# Patient Record
Sex: FEMALE | Race: WHITE | NOT HISPANIC OR LATINO | Employment: FULL TIME | URBAN - METROPOLITAN AREA
[De-identification: names, ages, dates, MRNs, and addresses within clinical notes are randomized per-mention and may not be internally consistent; named-entity substitution may affect disease eponyms.]

---

## 2017-06-25 DIAGNOSIS — F32.A DEPRESSION: ICD-10-CM

## 2017-06-25 RX ORDER — SERTRALINE HYDROCHLORIDE 50 MG/1
TABLET, FILM COATED ORAL
Qty: 90 TABLET | Refills: 2 | Status: SHIPPED | OUTPATIENT
Start: 2017-06-25 | End: 2017-09-19 | Stop reason: SDUPTHER

## 2017-08-09 ENCOUNTER — TELEPHONE (OUTPATIENT)
Dept: HEMATOLOGY/ONCOLOGY | Facility: CLINIC | Age: 57
End: 2017-08-09

## 2017-08-09 ENCOUNTER — TELEPHONE (OUTPATIENT)
Dept: SURGERY | Facility: CLINIC | Age: 57
End: 2017-08-09

## 2017-08-09 DIAGNOSIS — Z85.3 PERSONAL HISTORY OF MALIGNANT NEOPLASM OF BREAST: Primary | ICD-10-CM

## 2017-08-09 NOTE — TELEPHONE ENCOUNTER
Returned call, schedule pt mammogram and appointment with isma Acevedo requested that the appointment be on separate days

## 2017-08-09 NOTE — TELEPHONE ENCOUNTER
----- Message from Dorothy Porras MA sent at 8/9/2017  9:59 AM CDT -----  Contact: Pt       ----- Message -----  From: Clement Hernandez  Sent: 8/9/2017   9:55 AM  To: Joshua SANTIAGO Staff    Pt would like a call back from nurse in ref to rescheduling visit to 9/27/17, if possible    Pt can be reached at [phone]

## 2017-08-09 NOTE — TELEPHONE ENCOUNTER
----- Message from Giovanna Zamorano sent at 8/9/2017 11:36 AM CDT -----  [phone]//pt states that she needs to speak with nurse in ref to her mammo order//please call//thank you

## 2017-09-19 ENCOUNTER — OFFICE VISIT (OUTPATIENT)
Dept: HEMATOLOGY/ONCOLOGY | Facility: CLINIC | Age: 57
End: 2017-09-19
Payer: COMMERCIAL

## 2017-09-19 ENCOUNTER — HOSPITAL ENCOUNTER (OUTPATIENT)
Dept: RADIOLOGY | Facility: HOSPITAL | Age: 57
Discharge: HOME | End: 2017-09-19
Attending: SURGERY
Payer: COMMERCIAL

## 2017-09-19 ENCOUNTER — HOSPITAL ENCOUNTER (OUTPATIENT)
Dept: RADIOLOGY | Facility: HOSPITAL | Age: 57
Discharge: HOME | End: 2017-09-19
Attending: INTERNAL MEDICINE
Payer: COMMERCIAL

## 2017-09-19 VITALS
SYSTOLIC BLOOD PRESSURE: 127 MMHG | HEART RATE: 59 BPM | TEMPERATURE: 98 F | DIASTOLIC BLOOD PRESSURE: 73 MMHG | WEIGHT: 180.13 LBS | BODY MASS INDEX: 29.97 KG/M2 | RESPIRATION RATE: 14 BRPM

## 2017-09-19 DIAGNOSIS — J45.20 MILD INTERMITTENT ASTHMA WITHOUT COMPLICATION: ICD-10-CM

## 2017-09-19 DIAGNOSIS — Z85.3 PERSONAL HISTORY OF MALIGNANT NEOPLASM OF BREAST: ICD-10-CM

## 2017-09-19 DIAGNOSIS — E03.9 ACQUIRED HYPOTHYROIDISM: ICD-10-CM

## 2017-09-19 DIAGNOSIS — J45.909 UNSPECIFIED ASTHMA(493.90): ICD-10-CM

## 2017-09-19 DIAGNOSIS — Z85.3 HISTORY OF BREAST CANCER: Primary | ICD-10-CM

## 2017-09-19 DIAGNOSIS — F32.89 OTHER DEPRESSION: ICD-10-CM

## 2017-09-19 DIAGNOSIS — G89.29 CHEST WALL PAIN, CHRONIC: ICD-10-CM

## 2017-09-19 DIAGNOSIS — R10.13 EPIGASTRIC PAIN: ICD-10-CM

## 2017-09-19 DIAGNOSIS — R07.89 CHEST WALL PAIN, CHRONIC: ICD-10-CM

## 2017-09-19 PROCEDURE — 99214 OFFICE O/P EST MOD 30 MIN: CPT | Mod: S$GLB,,, | Performed by: INTERNAL MEDICINE

## 2017-09-19 PROCEDURE — 77062 BREAST TOMOSYNTHESIS BI: CPT | Mod: TC

## 2017-09-19 PROCEDURE — 99999 PR PBB SHADOW E&M-EST. PATIENT-LVL III: CPT | Mod: PBBFAC,,, | Performed by: INTERNAL MEDICINE

## 2017-09-19 PROCEDURE — 71101 X-RAY EXAM UNILAT RIBS/CHEST: CPT | Mod: 26,,, | Performed by: RADIOLOGY

## 2017-09-19 PROCEDURE — 77062 BREAST TOMOSYNTHESIS BI: CPT | Mod: 26,,, | Performed by: RADIOLOGY

## 2017-09-19 PROCEDURE — 77066 DX MAMMO INCL CAD BI: CPT | Mod: 26,,, | Performed by: RADIOLOGY

## 2017-09-19 PROCEDURE — 3074F SYST BP LT 130 MM HG: CPT | Mod: S$GLB,,, | Performed by: INTERNAL MEDICINE

## 2017-09-19 PROCEDURE — 3078F DIAST BP <80 MM HG: CPT | Mod: S$GLB,,, | Performed by: INTERNAL MEDICINE

## 2017-09-19 PROCEDURE — 71101 X-RAY EXAM UNILAT RIBS/CHEST: CPT | Mod: TC

## 2017-09-19 PROCEDURE — 3008F BODY MASS INDEX DOCD: CPT | Mod: S$GLB,,, | Performed by: INTERNAL MEDICINE

## 2017-09-19 RX ORDER — ALBUTEROL SULFATE 90 UG/1
2 AEROSOL, METERED RESPIRATORY (INHALATION) EVERY 6 HOURS PRN
Qty: 18 G | Refills: 12 | Status: SHIPPED | OUTPATIENT
Start: 2017-09-19 | End: 2019-10-09 | Stop reason: SDUPTHER

## 2017-09-19 RX ORDER — SERTRALINE HYDROCHLORIDE 50 MG/1
50 TABLET, FILM COATED ORAL DAILY
Qty: 90 TABLET | Refills: 2 | Status: SHIPPED | OUTPATIENT
Start: 2017-09-19 | End: 2018-09-28 | Stop reason: SDUPTHER

## 2017-09-19 RX ORDER — LEVOTHYROXINE SODIUM 50 UG/1
50 TABLET ORAL DAILY
Qty: 30 TABLET | Refills: 11 | Status: SHIPPED | OUTPATIENT
Start: 2017-09-19 | End: 2017-11-08 | Stop reason: SDUPTHER

## 2017-09-19 RX ORDER — FLUTICASONE PROPIONATE AND SALMETEROL 250; 50 UG/1; UG/1
1 POWDER RESPIRATORY (INHALATION) 2 TIMES DAILY PRN
Qty: 60 EACH | Refills: 12 | Status: ON HOLD | OUTPATIENT
Start: 2017-09-19 | End: 2024-03-28 | Stop reason: CLARIF

## 2017-09-19 NOTE — PROGRESS NOTES
Subjective:       Patient ID: Mayra Crawford is a 57 y.o. female.    Chief Complaint: No chief complaint on file.    HPI Ms. Crawford returned to clinic for followup of remote history of carcinoma of  the left breast.  Today she reports she's had some abdominal bloating after eating some irregularity of her bowels.  Appetites been good and she's gained some weight.    She has some chronic right lower lateral chest wall tenderness which is intermittent.  That's been going on for a long time.  She points no shortness of breath.  She is asking today if she needs to have screening for an abdominal aneurysm since her father had that.  She also reports that her posture seems to be worse.  She also needs a new Medicare physician: Like to see Dr. Cardona if possible.        Breast history diagnosed in 1992 treated with lumpectomy and adjuvant       chemotherapy with 5-FU and methotrexate on protocol NSABP B-20.  She then    took 5 years of tamoxifen and completed in 1997.          Review of Systems   Constitutional: Negative for activity change, appetite change and unexpected weight change.   Respiratory: Negative for cough, shortness of breath and wheezing.    Cardiovascular: Positive for chest pain (ight chest wall).   Gastrointestinal: Positive for abdominal distention and constipation. Negative for diarrhea and nausea.   Musculoskeletal: Negative for back pain and neck pain.   Psychiatric/Behavioral: Negative for dysphoric mood. The patient is not nervous/anxious.        Objective:      Physical Exam   Constitutional: She appears well-developed and well-nourished.   HENT:   Mouth/Throat: No oropharyngeal exudate.   Eyes: No scleral icterus.   Cardiovascular: Normal rate, regular rhythm and normal heart sounds.    Pulmonary/Chest: Effort normal and breath sounds normal. She has no wheezes. She has no rales. Right breast exhibits no mass, no nipple discharge and no skin change. Left breast exhibits no mass, no nipple  discharge and no skin change.       Abdominal: Soft. She exhibits no mass. There is no tenderness.   Lymphadenopathy:     She has no cervical adenopathy.     She has no axillary adenopathy.        Right: No supraclavicular adenopathy present.        Left: No supraclavicular adenopathy present.   Psychiatric: She has a normal mood and affect. Her behavior is normal. Thought content normal.   Vitals reviewed.      Assessment:     TSH 4.86, T4 0.66, metabolic profile is unremarkable, cholesterol 217.    1. History of breast cancer        Plan:     She will have her mammogram later today.  We'll arrange for her to have x-rays of her right ribs.(negative)  GI consult.  Start thyroid replacement - 0.05 levothyroxine.  We'll see if we can arrange for her to have her primary care follow-up with Dr. Cardona.  Return in one year

## 2017-09-20 ENCOUNTER — TELEPHONE (OUTPATIENT)
Dept: HEMATOLOGY/ONCOLOGY | Facility: CLINIC | Age: 57
End: 2017-09-20

## 2017-09-20 NOTE — TELEPHONE ENCOUNTER
----- Message from Paddy Brown sent at 9/20/2017  9:39 AM CDT -----  Contact: Pt  Pt spoke with Dr. Lewis on yesterday in regards to seeking an interventional pulmonologist for mother     Mother is diagnosed with Tracheomalacia     Will like Dr. Lewis to give her a call in regards to convo    Contact::[phone]

## 2017-09-21 NOTE — TELEPHONE ENCOUNTER
Called and spoke with patient in regards to changing her gastro apt. Patient stated she was in the middle of something and would call back.

## 2017-09-22 ENCOUNTER — TELEPHONE (OUTPATIENT)
Dept: GASTROENTEROLOGY | Facility: CLINIC | Age: 57
End: 2017-09-22

## 2017-09-22 NOTE — TELEPHONE ENCOUNTER
----- Message from Jerilyn Andrews sent at 9/22/2017 10:13 AM CDT -----  Contact: Pt  Pt called to speak to the nurse to reschedule her appt with the provider and to request a new pt work in appt on a Wednesday which is her off day from work and would like a call back.    Pt can be reached at [phone].    Thanks

## 2017-09-26 ENCOUNTER — TELEPHONE (OUTPATIENT)
Dept: ALLERGY | Facility: CLINIC | Age: 57
End: 2017-09-26

## 2017-09-26 NOTE — TELEPHONE ENCOUNTER
----- Message from ERWIN Brown III, MD sent at 9/26/2017  9:24 AM CDT -----  Contact: self [phone]  As discussed.    ----- Message -----  From: Azalea Bejarano LPN  Sent: 9/25/2017   9:50 AM  To: ERWIN Brown III, MD    Hi,  You have not seen the patient (one who is asking for advice) in 4 yrs.  She is asking you to refer her mother (whom you have never seen before) to a physician.  Do you want me to have them call Pulmonary?  Thanks,  Azalea  ----- Message -----  From: Tamra Delaney LPN  Sent: 9/22/2017   9:57 AM  To: Azalea Bejarano LPN    Can you help with this?    ----- Message -----  From: Monse Tan  Sent: 9/22/2017   8:36 AM  To: Kevin Wing III Staff    Patient would like to know can MD recommend a doctor for her mother Tracheomalacia.  Mother isn't a patient of doctor . Please call and advise , Thanks !

## 2017-09-26 NOTE — TELEPHONE ENCOUNTER
Spoke with Dr. Brown, he suggested either Dr. Roland or Dr. Cornell.  Called MsAbner Yvette and gave her names and number for ENT.

## 2017-09-29 DIAGNOSIS — I10 ESSENTIAL HYPERTENSION: ICD-10-CM

## 2017-09-29 DIAGNOSIS — N31.9 BLADDER DYSFUNCTION: ICD-10-CM

## 2017-09-29 RX ORDER — TOLTERODINE 4 MG/1
CAPSULE, EXTENDED RELEASE ORAL
Qty: 90 CAPSULE | Refills: 3 | Status: SHIPPED | OUTPATIENT
Start: 2017-09-29 | End: 2018-10-12 | Stop reason: SDUPTHER

## 2017-09-29 RX ORDER — CLONIDINE HYDROCHLORIDE 0.1 MG/1
TABLET ORAL
Qty: 90 TABLET | Refills: 3 | Status: SHIPPED | OUTPATIENT
Start: 2017-09-29 | End: 2018-10-12 | Stop reason: SDUPTHER

## 2017-10-04 ENCOUNTER — TELEPHONE (OUTPATIENT)
Dept: SURGERY | Facility: CLINIC | Age: 57
End: 2017-10-04

## 2017-10-04 NOTE — TELEPHONE ENCOUNTER
Called patient regarding appointment scheduled for today Wednesday 10/4/17.  The patient NO SHOWED the appointment.  Message left on both the patient's home and mobile numbers listed in EPIC regarding rescheduling the appointment.

## 2017-10-06 ENCOUNTER — OFFICE VISIT (OUTPATIENT)
Dept: GASTROENTEROLOGY | Facility: CLINIC | Age: 57
End: 2017-10-06
Payer: COMMERCIAL

## 2017-10-06 VITALS
SYSTOLIC BLOOD PRESSURE: 128 MMHG | DIASTOLIC BLOOD PRESSURE: 80 MMHG | BODY MASS INDEX: 30.23 KG/M2 | HEIGHT: 65 IN | HEART RATE: 61 BPM | WEIGHT: 181.44 LBS

## 2017-10-06 DIAGNOSIS — R10.10 UPPER ABDOMINAL PAIN: Primary | ICD-10-CM

## 2017-10-06 DIAGNOSIS — K59.00 CONSTIPATION, UNSPECIFIED CONSTIPATION TYPE: ICD-10-CM

## 2017-10-06 PROCEDURE — 99204 OFFICE O/P NEW MOD 45 MIN: CPT | Mod: S$GLB,,, | Performed by: INTERNAL MEDICINE

## 2017-10-06 PROCEDURE — 99999 PR PBB SHADOW E&M-EST. PATIENT-LVL III: CPT | Mod: PBBFAC,,, | Performed by: INTERNAL MEDICINE

## 2017-10-06 NOTE — LETTER
October 6, 2017      Delfin Lewis MD  [address]  [facility] [address]           [facility] - Gastroenterology  [address]  [facility] [address]  Phone: [phone]  Fax: [phone]          Patient: Mayra Crawford   MR Number: [id number]   YOB: 1960   Date of Visit: 10/6/2017       Dear Dr. Delfin Lewis:    Thank you for referring Mayra Crawford to me for evaluation. Attached you will find relevant portions of my assessment and plan of care.    If you have questions, please do not hesitate to call me. I look forward to following Mayra Crawford along with you.    Sincerely,    Joe Cabral MD    Enclosure  CC:  No Recipients    If you would like to receive this communication electronically, please contact [email or web address] or [phone] to request more information on [employer] Link access.    For providers and/or their staff who would like to refer a patient to [facility], please contact us through our one-stop-shop provider referral line, [facility], at [phone].    If you feel you have received this communication in error or would no longer like to receive these types of communications, please e-mail [email or web address]

## 2017-10-06 NOTE — PROGRESS NOTES
REASON FOR VISIT:  Upper abdominal pain, bloating, constipation.    HISTORY OF PRESENT ILLNESS:  Ms. Mayra Crawford is a 57-year-old who has been   having issues with constipation on and off for years, but most recently she has   noticed worsening constipation and having to take multiple laxatives and   MiraLax.  She underwent lab testing with Dr. Lewis and was found to have mild   hypothyroidism and was started on Synthroid 50 mcg daily and over the past three   weeks or so, she has noticed improvement in her bowel movements.  She is also   taking [place]-Colace daily as well.  Her other issues are dull aching pain in her   upper abdomen, which usually worsens after a meal within 20 minutes of a meal.    She also has issues with abdominal bloating, which has improved somewhat after   the constipation has improved as well.  Denies any nausea or vomiting.  She does   not have any dysphagia, odynophagia or any daniela acid regurgitation or   heartburn.  She does take Gaviscon p.r.n. for her upper abdominal discomfort   with some relief.  No weight loss.  Appetite stable.  Denies any regular use of   NSAIDs.    PAST MEDICAL, SURGICAL, SOCIAL AND FAMILY HISTORY:  Reviewed.    MEDICATIONS AND ALLERGIES:  Reviewed.    REVIEW OF SYSTEMS:  CONSTITUTIONAL:  No fever, no chills, no weight loss.  Appetite is normal.  EYES:  No visual changes.  ENT:  No odynophagia or hoarseness of voice.  CARDIOVASCULAR:  No angina or palpitations.  RESPIRATORY:  No shortness of breath or wheezing.  GENITOURINARY:  No dysuria or frequency.  MUSCULOSKELETAL:  No myalgia, no arthralgia.  SKIN:  No pruritus or eczema.  NEUROLOGIC:  No headache, no seizures.  PSYCHIATRIC:  No anxiety or depression.  GASTROINTESTINAL:  See HPI.    PHYSICAL EXAMINATION:  VITAL SIGNS:  See EPIC.  GENERAL:  Awake, alert and oriented x3, in no acute distress.  NECK:  Supple.  No carotid bruits.  No cervical adenopathy.  ABDOMEN:  Obese, soft, nontender, nondistended.  No  masses palpable.  No   hepatosplenomegaly appreciated.  Bowel sounds are normal.  No abdominal bruits   heard.  CARDIOVASCULAR:  S1 normal with loud S2.  No murmurs heard.  RESPIRATORY:  Bilateral air entry equal.  SKIN:  No palmar erythema or spider angiomata.  NEUROLOGIC:  No tremors or asterixis.  PSYCHIATRY:  Affect appropriate.  LOWER EXTREMITY:  No pedal edema.    IMPRESSION:  1.  Upper abdominal pain - etiology unclear, possibility of gastroesophageal   reflux versus gastritis.   2.  Constipation, most likely secondary to hypothyroidism - improving.    RECOMMENDATION:  1.  Proceed with endoscopic evaluation.  2.  Ultrasound of the abdomen to evaluate the gallbladder.  3.  Continue current regimen for constipation per Dr. Lewis.  No need for   colonoscopy at this time.      ACT/HN  dd: 10/06/2017 15:46:42 (CDT)  td: 10/07/2017 00:14:16 (CDT)  Doc ID   #[id number]  Job ID #[id number]    CC:

## 2017-10-09 ENCOUNTER — TELEPHONE (OUTPATIENT)
Dept: GASTROENTEROLOGY | Facility: CLINIC | Age: 57
End: 2017-10-09

## 2017-10-09 NOTE — TELEPHONE ENCOUNTER
----- Message from Raegan José sent at 10/9/2017  9:15 AM CDT -----  Contact: Self- [phone]  Misha- pt called to schedule her ultrasound prior to her scope on 11/8- please call pt back at [phone]

## 2017-11-08 DIAGNOSIS — E03.9 ACQUIRED HYPOTHYROIDISM: ICD-10-CM

## 2017-11-09 RX ORDER — LEVOTHYROXINE SODIUM 50 UG/1
50 TABLET ORAL DAILY
Qty: 90 TABLET | Refills: 3 | Status: SHIPPED | OUTPATIENT
Start: 2017-11-09 | End: 2018-11-28 | Stop reason: SDUPTHER

## 2018-01-10 ENCOUNTER — OFFICE VISIT (OUTPATIENT)
Dept: URGENT CARE | Facility: CLINIC | Age: 58
End: 2018-01-10
Payer: COMMERCIAL

## 2018-01-10 ENCOUNTER — HOSPITAL ENCOUNTER (OUTPATIENT)
Dept: RADIOLOGY | Facility: HOSPITAL | Age: 58
Discharge: HOME | End: 2018-01-10
Attending: INTERNAL MEDICINE
Payer: COMMERCIAL

## 2018-01-10 VITALS
DIASTOLIC BLOOD PRESSURE: 78 MMHG | OXYGEN SATURATION: 97 % | TEMPERATURE: 97 F | BODY MASS INDEX: 30.12 KG/M2 | HEART RATE: 86 BPM | SYSTOLIC BLOOD PRESSURE: 113 MMHG | WEIGHT: 181 LBS

## 2018-01-10 DIAGNOSIS — J06.9 VIRAL URI WITH COUGH: Primary | ICD-10-CM

## 2018-01-10 DIAGNOSIS — R10.10 UPPER ABDOMINAL PAIN: ICD-10-CM

## 2018-01-10 PROCEDURE — 96372 THER/PROPH/DIAG INJ SC/IM: CPT | Mod: S$GLB,,, | Performed by: FAMILY MEDICINE

## 2018-01-10 PROCEDURE — 76700 US EXAM ABDOM COMPLETE: CPT | Mod: 26,,, | Performed by: RADIOLOGY

## 2018-01-10 PROCEDURE — 99203 OFFICE O/P NEW LOW 30 MIN: CPT | Mod: S$GLB,,, | Performed by: PHYSICIAN ASSISTANT

## 2018-01-10 PROCEDURE — 76700 US EXAM ABDOM COMPLETE: CPT | Mod: TC

## 2018-01-10 RX ORDER — DEXAMETHASONE SODIUM PHOSPHATE 100 MG/10ML
6 INJECTION INTRAMUSCULAR; INTRAVENOUS ONCE
Status: COMPLETED | OUTPATIENT
Start: 2018-01-10 | End: 2018-01-10

## 2018-01-10 RX ORDER — AZITHROMYCIN 250 MG/1
TABLET, FILM COATED ORAL
Qty: 6 TABLET | Refills: 0 | Status: SHIPPED | OUTPATIENT
Start: 2018-01-12 | End: 2018-12-04 | Stop reason: ALTCHOICE

## 2018-01-10 RX ORDER — CODEINE PHOSPHATE AND GUAIFENESIN 10; 100 MG/5ML; MG/5ML
5 SOLUTION ORAL 3 TIMES DAILY PRN
Qty: 120 ML | Refills: 0 | Status: SHIPPED | OUTPATIENT
Start: 2018-01-10 | End: 2018-01-20

## 2018-01-10 RX ADMIN — DEXAMETHASONE SODIUM PHOSPHATE 6 MG: 100 INJECTION INTRAMUSCULAR; INTRAVENOUS at 10:01

## 2018-01-10 NOTE — PROGRESS NOTES
Subjective:       Patient ID: Mayra Crawford is a 57 y.o. female.    Vitals:  weight is 82.1 kg (181 lb). Her temperature is 97.3 °F (36.3 °C). Her blood pressure is 113/78 and her pulse is 86. Her oxygen saturation is 97%.     Chief Complaint: URI    URI    This is a new problem. The current episode started in the past 7 days. The problem has been unchanged. The maximum temperature recorded prior to her arrival was 100.4 - 100.9 F. Associated symptoms include coughing. Pertinent negatives include no abdominal pain, chest pain, congestion, ear pain, headaches, nausea, sore throat or wheezing. Treatments tried: otc cough. The treatment provided no relief.     Review of Systems   Constitution: Positive for fever. Negative for chills and malaise/fatigue.   HENT: Negative for congestion, ear pain, hoarse voice and sore throat.    Eyes: Negative for discharge and redness.   Cardiovascular: Negative for chest pain, dyspnea on exertion and leg swelling.        Chest congestion     Respiratory: Positive for cough and sputum production. Negative for shortness of breath and wheezing.    Musculoskeletal: Negative for myalgias.   Gastrointestinal: Negative for abdominal pain and nausea.   Neurological: Negative for headaches.       Objective:      Physical Exam   Constitutional: She is oriented to person, place, and time. She appears well-developed and well-nourished. She is cooperative.  Non-toxic appearance. She does not appear ill. No distress.   HENT:   Head: Normocephalic and atraumatic.   Right Ear: Hearing, tympanic membrane, external ear and ear canal normal.   Left Ear: Hearing, tympanic membrane, external ear and ear canal normal.   Nose: Rhinorrhea present. No mucosal edema or nasal deformity. No epistaxis. Right sinus exhibits no maxillary sinus tenderness and no frontal sinus tenderness. Left sinus exhibits no maxillary sinus tenderness and no frontal sinus tenderness.   Mouth/Throat: Uvula is midline and mucous  membranes are normal. No trismus in the jaw. Normal dentition. No uvula swelling. Posterior oropharyngeal erythema present.   Eyes: Conjunctivae and lids are normal. No scleral icterus.   Sclera clear bilat   Neck: Trachea normal, full passive range of motion without pain and phonation normal. Neck supple.   Cardiovascular: Normal rate, regular rhythm, normal heart sounds, intact distal pulses and normal pulses.    Pulmonary/Chest: Effort normal and breath sounds normal. No accessory muscle usage. No respiratory distress. She has no decreased breath sounds. She has no wheezes. She has no rhonchi. She has no rales.   Abdominal: Soft. Normal appearance and bowel sounds are normal. She exhibits no distension. There is no tenderness.   Musculoskeletal: Normal range of motion. She exhibits no edema or deformity.   Neurological: She is alert and oriented to person, place, and time. She exhibits normal muscle tone. Coordination normal.   Skin: Skin is warm, dry and intact. She is not diaphoretic. No pallor.   Psychiatric: She has a normal mood and affect. Her speech is normal and behavior is normal. Judgment and thought content normal. Cognition and memory are normal.   Nursing note and vitals reviewed.      Assessment:       1. Viral URI with cough        Plan:         Viral URI with cough  -     dexamethasone injection 6 mg; Inject 0.6 mLs (6 mg total) into the muscle once.  -     guaifenesin-codeine 100-10 mg/5 ml (TUSSI-ORGANIDIN NR)  mg/5 mL syrup; Take 5 mLs by mouth 3 (three) times daily as needed for Cough.  Dispense: 120 mL; Refill: 0  -     azithromycin (ZITHROMAX Z-TALITA) 250 MG tablet; Take 2 tablets (500 mg) on  Day 1,  followed by 1 tablet (250 mg) once daily on Days 2 through 5.  Dispense: 6 tablet; Refill: 0        Viral Upper Respiratory Illness (Adult)  You have a viral upper respiratory illness (URI), which is another term for the common cold. This illness is contagious during the first few days. It  is spread through the air by coughing and sneezing. It may also be spread by direct contact (touching the sick person and then touching your own eyes, nose, or mouth). Frequent handwashing will decrease risk of spread. Most viral illnesses go away within 7 to 10 days with rest and simple home remedies. Sometimes the illness may last for several weeks. Antibiotics will not kill a virus, and they are generally not prescribed for this condition.    Home care  · If symptoms are severe, rest at home for the first 2 to 3 days. When you resume activity, don't let yourself get too tired.  · Avoid being exposed to cigarette smoke (yours or others).  · You may use acetaminophen or ibuprofen to control pain and fever, unless another medicine was prescribed. (Note: If you have chronic liver or kidney disease, have ever had a stomach ulcer or gastrointestinal bleeding, or are taking blood-thinning medicines, talk with your healthcare provider before using these medicines.) Aspirin should never be given to anyone under 18 years of age who is ill with a viral infection or fever. It may cause severe liver or brain damage.  · Your appetite may be poor, so a light diet is fine. Avoid dehydration by drinking 6 to 8 glasses of fluids per day (water, soft drinks, juices, tea, or soup). Extra fluids will help loosen secretions in the nose and lungs.  · Over-the-counter cold medicines will not shorten the length of time youre sick, but they may be helpful for the following symptoms: cough, sore throat, and nasal and sinus congestion. (Note: Do not use decongestants if you have high blood pressure.)  Follow-up care  Follow up with your healthcare provider, or as advised.  When to seek medical advice  Call your healthcare provider right away if any of these occur:  · Cough with lots of colored sputum (mucus)  · Severe headache; face, neck, or ear pain  · Difficulty swallowing due to throat pain  · Fever of 100.4°F (38°C)  Call 911, or get  immediate medical care  Call emergency services right away if any of these occur:  · Chest pain, shortness of breath, wheezing, or difficulty breathing  · Coughing up blood  · Inability to swallow due to throat pain  Date Last Reviewed: 9/13/2015  © 8327-5926 [employer]. [address] [place] [address]. All rights reserved. This information is not intended as a substitute for professional medical care. Always follow your healthcare professional's instructions.      Please follow up with your Primary care provider within 2-5 days if your signs and symptoms have not resolved or worsen.     If your condition worsens or fails to improve we recommend that you receive another evaluation at the emergency room immediately or contact your primary medical clinic to discuss your concerns.   You must understand that you have received an Urgent Care treatment only and that you may be released before all of your medical problems are known or treated. You, the patient, will arrange for follow up care as instructed.     YOU HAVE BEEN GIVEN A PRESCRIPTION FOR ANTIBIOTICS. IT WAS ADVISED TO DELAY THE COURSE OF ANTIBIOTICS FOR 2-3 DAYS IF SYMPTOMS DO NOT RESOLVE. IT IMPORTANT TO FOLLOW THESE INSTRUCTIONS AS ANTIBIOTIC RESISTANCE IS HIGH. YOUR SYMPTOMS WILL LIKELY RESOLVE WITHOUT THIS PRESCRIPTIONS.

## 2018-01-10 NOTE — PATIENT INSTRUCTIONS
Viral Upper Respiratory Illness (Adult)  You have a viral upper respiratory illness (URI), which is another term for the common cold. This illness is contagious during the first few days. It is spread through the air by coughing and sneezing. It may also be spread by direct contact (touching the sick person and then touching your own eyes, nose, or mouth). Frequent handwashing will decrease risk of spread. Most viral illnesses go away within 7 to 10 days with rest and simple home remedies. Sometimes the illness may last for several weeks. Antibiotics will not kill a virus, and they are generally not prescribed for this condition.    Home care  · If symptoms are severe, rest at home for the first 2 to 3 days. When you resume activity, don't let yourself get too tired.  · Avoid being exposed to cigarette smoke (yours or others).  · You may use acetaminophen or ibuprofen to control pain and fever, unless another medicine was prescribed. (Note: If you have chronic liver or kidney disease, have ever had a stomach ulcer or gastrointestinal bleeding, or are taking blood-thinning medicines, talk with your healthcare provider before using these medicines.) Aspirin should never be given to anyone under 18 years of age who is ill with a viral infection or fever. It may cause severe liver or brain damage.  · Your appetite may be poor, so a light diet is fine. Avoid dehydration by drinking 6 to 8 glasses of fluids per day (water, soft drinks, juices, tea, or soup). Extra fluids will help loosen secretions in the nose and lungs.  · Over-the-counter cold medicines will not shorten the length of time youre sick, but they may be helpful for the following symptoms: cough, sore throat, and nasal and sinus congestion. (Note: Do not use decongestants if you have high blood pressure.)  Follow-up care  Follow up with your healthcare provider, or as advised.  When to seek medical advice  Call your healthcare provider right away if any  of these occur:  · Cough with lots of colored sputum (mucus)  · Severe headache; face, neck, or ear pain  · Difficulty swallowing due to throat pain  · Fever of 100.4°F (38°C)  Call 911, or get immediate medical care  Call emergency services right away if any of these occur:  · Chest pain, shortness of breath, wheezing, or difficulty breathing  · Coughing up blood  · Inability to swallow due to throat pain  Date Last Reviewed: 9/13/2015  © 1131-0682 [employer]. [address] [place] [id number]. All rights reserved. This information is not intended as a substitute for professional medical care. Always follow your healthcare professional's instructions.      Please follow up with your Primary care provider within 2-5 days if your signs and symptoms have not resolved or worsen.     If your condition worsens or fails to improve we recommend that you receive another evaluation at the emergency room immediately or contact your primary medical clinic to discuss your concerns.   You must understand that you have received an Urgent Care treatment only and that you may be released before all of your medical problems are known or treated. You, the patient, will arrange for follow up care as instructed.     YOU HAVE BEEN GIVEN A PRESCRIPTION FOR ANTIBIOTICS. IT WAS ADVISED TO DELAY THE COURSE OF ANTIBIOTICS FOR 2-3 DAYS IF SYMPTOMS DO NOT RESOLVE. IT IMPORTANT TO FOLLOW THESE INSTRUCTIONS AS ANTIBIOTIC RESISTANCE IS HIGH. YOUR SYMPTOMS WILL LIKELY RESOLVE WITHOUT THIS PRESCRIPTIONS.

## 2018-01-11 ENCOUNTER — TELEPHONE (OUTPATIENT)
Dept: GASTROENTEROLOGY | Facility: CLINIC | Age: 58
End: 2018-01-11

## 2018-01-11 NOTE — TELEPHONE ENCOUNTER
----- Message from Joe Cabral MD sent at 1/10/2018  1:20 PM CST -----  US reveals two large stones in the gallbladder. Please schedule a follow up visit.

## 2018-01-19 ENCOUNTER — TELEPHONE (OUTPATIENT)
Dept: GASTROENTEROLOGY | Facility: CLINIC | Age: 58
End: 2018-01-19

## 2018-01-19 NOTE — TELEPHONE ENCOUNTER
----- Message from Quiana Catherine MA sent at 1/19/2018  8:55 AM CST -----  Contact: self  [phone]  States she is calling for her ultrasound test results.

## 2018-01-24 ENCOUNTER — HOSPITAL ENCOUNTER (OUTPATIENT)
Facility: HOSPITAL | Age: 58
Discharge: HOME | End: 2018-01-24
Attending: INTERNAL MEDICINE | Admitting: INTERNAL MEDICINE
Payer: COMMERCIAL

## 2018-01-24 ENCOUNTER — ANESTHESIA (OUTPATIENT)
Dept: ENDOSCOPY | Facility: HOSPITAL | Age: 58
End: 2018-01-24
Payer: COMMERCIAL

## 2018-01-24 ENCOUNTER — SURGERY (OUTPATIENT)
Age: 58
End: 2018-01-24

## 2018-01-24 ENCOUNTER — ANESTHESIA EVENT (OUTPATIENT)
Dept: ENDOSCOPY | Facility: HOSPITAL | Age: 58
End: 2018-01-24
Payer: COMMERCIAL

## 2018-01-24 VITALS
OXYGEN SATURATION: 98 % | HEIGHT: 65 IN | BODY MASS INDEX: 29.99 KG/M2 | WEIGHT: 180 LBS | TEMPERATURE: 98 F | SYSTOLIC BLOOD PRESSURE: 131 MMHG | RESPIRATION RATE: 20 BRPM | DIASTOLIC BLOOD PRESSURE: 62 MMHG | HEART RATE: 66 BPM

## 2018-01-24 DIAGNOSIS — R10.10 UPPER ABDOMINAL PAIN: ICD-10-CM

## 2018-01-24 DIAGNOSIS — Z12.11 COLON CANCER SCREENING: Primary | ICD-10-CM

## 2018-01-24 PROCEDURE — D9220A PRA ANESTHESIA: Mod: CRNA,,, | Performed by: NURSE ANESTHETIST, CERTIFIED REGISTERED

## 2018-01-24 PROCEDURE — 27201012 HC FORCEPS, HOT/COLD, DISP: Performed by: INTERNAL MEDICINE

## 2018-01-24 PROCEDURE — 37000008 HC ANESTHESIA 1ST 15 MINUTES: Performed by: INTERNAL MEDICINE

## 2018-01-24 PROCEDURE — 63600175 PHARM REV CODE 636 W HCPCS: Performed by: NURSE ANESTHETIST, CERTIFIED REGISTERED

## 2018-01-24 PROCEDURE — 43239 EGD BIOPSY SINGLE/MULTIPLE: CPT | Mod: ,,, | Performed by: INTERNAL MEDICINE

## 2018-01-24 PROCEDURE — 37000009 HC ANESTHESIA EA ADD 15 MINS: Performed by: INTERNAL MEDICINE

## 2018-01-24 PROCEDURE — 43239 EGD BIOPSY SINGLE/MULTIPLE: CPT | Performed by: INTERNAL MEDICINE

## 2018-01-24 PROCEDURE — 25000003 PHARM REV CODE 250: Performed by: INTERNAL MEDICINE

## 2018-01-24 PROCEDURE — 88305 TISSUE EXAM BY PATHOLOGIST: CPT | Mod: 59 | Performed by: PATHOLOGY

## 2018-01-24 PROCEDURE — D9220A PRA ANESTHESIA: Mod: ANES,,, | Performed by: ANESTHESIOLOGY

## 2018-01-24 RX ORDER — LIDOCAINE HCL/PF 100 MG/5ML
SYRINGE (ML) INTRAVENOUS
Status: DISCONTINUED | OUTPATIENT
Start: 2018-01-24 | End: 2018-01-24

## 2018-01-24 RX ORDER — PROPOFOL 10 MG/ML
VIAL (ML) INTRAVENOUS
Status: DISCONTINUED | OUTPATIENT
Start: 2018-01-24 | End: 2018-01-24

## 2018-01-24 RX ORDER — SODIUM CHLORIDE 9 MG/ML
INJECTION, SOLUTION INTRAVENOUS CONTINUOUS
Status: DISCONTINUED | OUTPATIENT
Start: 2018-01-24 | End: 2018-01-24 | Stop reason: HOSPADM

## 2018-01-24 RX ADMIN — PROPOFOL 100 MG: 10 INJECTION, EMULSION INTRAVENOUS at 10:01

## 2018-01-24 RX ADMIN — PROPOFOL 50 MG: 10 INJECTION, EMULSION INTRAVENOUS at 10:01

## 2018-01-24 RX ADMIN — LIDOCAINE HYDROCHLORIDE 100 MG: 20 INJECTION, SOLUTION INTRAVENOUS at 10:01

## 2018-01-24 RX ADMIN — SODIUM CHLORIDE: 0.9 INJECTION, SOLUTION INTRAVENOUS at 10:01

## 2018-01-24 NOTE — TRANSFER OF CARE
"Anesthesia Transfer of Care Note    Patient: Mayra Crawford    Procedure(s) Performed: Procedure(s) (LRB):  ESOPHAGOGASTRODUODENOSCOPY (EGD) (N/A)    Patient location: PACU    Anesthesia Type: general    Transport from OR: Transported from OR on room air with adequate spontaneous ventilation    Post pain: adequate analgesia    Post assessment: no apparent anesthetic complications    Post vital signs: stable    Level of consciousness: awake    Nausea/Vomiting: no nausea/vomiting    Complications: none    Transfer of care protocol was followed      Last vitals:   Visit Vitals  BP (!) 153/73 (BP Location: Left arm, Patient Position: Lying)   Pulse 75   Temp 36.5 °C (97.7 °F) (Temporal)   Resp 16   Ht 5' 5" (1.651 m)   Wt 81.6 kg (180 lb)   SpO2 99%   Breastfeeding? No   BMI 29.95 kg/m²     "

## 2018-01-24 NOTE — DISCHARGE INSTRUCTIONS

## 2018-01-24 NOTE — H&P
Short Stay Endoscopy History and Physical    PCP - Primary Doctor No    Procedure - EGD  Sedation: GA  ASA - per anesthesia  Mallampati - per anesthesia  History of Anesthesia problems - no  Family history Anesthesia problems -  no     HPI:  This is a 57 y.o. female here for evaluation of : Upper abdominal pain    Reflux - no  Dysphagia - no  Abdominal pain - yes  Diarrhea - no    ROS:  Constitutional: No fevers, chills, No weight loss  ENT: No allergies  CV: No chest pain  Pulm: No cough, No shortness of breath  Ophtho: No vision changes  GI: see HPI      Medical History:  has a past medical history of Asthma; Breast cancer (1992); Chronic constipation; Genetic testing (12/2006); Heart murmur; and kidney stone.    Surgical History:  has a past surgical history that includes Hysterectomy; Bilateral salpingoophorectomy; Breast biopsy (~1995); anal fissure; Cervical conization w/ laser; and Breast lumpectomy (1992).    Family History: family history includes Breast cancer in her maternal aunt, maternal aunt, and paternal aunt; Cancer in her father and maternal aunt; Diabetes in her father; Heart disease in her father; Hypertension in her mother; Nephrolithiasis in her mother.. Otherwise no colon cancer, inflammatory bowel disease, or GI malignancies.    Social History:  reports that she has never smoked. She has never used smokeless tobacco. She reports that she does not drink alcohol or use drugs.    Review of patient's allergies indicates:   Allergen Reactions    Penicillins      Other reaction(s): Rash    Other Rash and Other (See Comments)     Other reaction(s): Swelling  Other reaction(s): Sneezing  Other reaction(s): Shortness of breath    Pt reports she is allergic to meat       Medications:   Prescriptions Prior to Admission   Medication Sig Dispense Refill Last Dose    albuterol 90 mcg/actuation inhaler Inhale 2 puffs into the lungs every 6 (six) hours as needed for Wheezing. 18 g 12 Taking     azithromycin (ZITHROMAX Z-TALITA) 250 MG tablet Take 2 tablets (500 mg) on  Day 1,  followed by 1 tablet (250 mg) once daily on Days 2 through 5. 6 tablet 0     CETIRIZINE HCL (ZYRTEC ORAL) daily as needed.    Not Taking    cloNIDine (CATAPRES) 0.1 MG tablet TAKE 1 TABLET BY MOUTH EVERY DAY 90 tablet 3 Taking    docusate sodium (STOOL SOFTENER) 50 MG capsule Take by mouth 2 (two) times daily.   Taking    epinephrine (EPIPEN) 0.3 mg/0.3 mL (1:1,000) AtIn Inject 0.3 mLs (0.3 mg total) into the muscle once. 1 Pen Injector Intramuscular .  use as directed 0.3 mL 0 Taking    fluticasone-salmeterol 250-50 mcg/dose (ADVAIR DISKUS) 250-50 mcg/dose diskus inhaler Inhale 1 puff into the lungs 2 (two) times daily as needed. 1 Disk with Device Inhalation Twice a day 60 each 12 Taking    levothyroxine (SYNTHROID) 50 MCG tablet Take 1 tablet (50 mcg total) by mouth once daily. 90 tablet 3 Taking    meclizine (ANTIVERT) 12.5 mg tablet Take 1 tablet (12.5 mg total) by mouth every 6 (six) hours. 1 Tablet Oral Three times a day 20 tablet 3 Not Taking    mometasone (ASMANEX TWISTHALER) 220 mcg (60 doses) AePB 2 puffs inhaled once daily. Disp one canister 1 g 1 Taking    sertraline (ZOLOFT) 50 MG tablet Take 1 tablet (50 mg total) by mouth once daily. 90 tablet 2 Taking    tolterodine (DETROL LA) 4 MG 24 hr capsule TAKE 1 CAPSULE BY MOUTH EVERY MORNING 90 capsule 3 Taking       Objective Findings:    Vital Signs: Per nursing notes.    Physical Exam:  General Appearance: Well appearing in no acute distress  Head:   Normocephalic, without obvious abnormality  Eyes:    No scleral icterus  Airway: Open  Neck: No restriction in mobility  Lungs: CTA bilaterally in anterior and posterior fields, no wheezes, no crackles.  Heart:  Regular rate and rhythm, S1, S2 normal, no murmurs heard  Abdomen: Soft, non tender, non distended      Labs:  Lab Results   Component Value Date    WBC 4.49 09/19/2017    HGB 13.1 09/19/2017    HCT 38.6  09/19/2017     (L) 09/19/2017    CHOL 217 (H) 09/19/2017    TRIG 151 (H) 09/19/2017    HDL 51 09/19/2017    ALT 14 09/19/2017    AST 14 09/19/2017     09/19/2017    K 4.6 09/19/2017     09/19/2017    CREATININE 0.9 09/19/2017    BUN 12 09/19/2017    CO2 30 (H) 09/19/2017    TSH 4.825 (H) 09/19/2017    INR 0.9 12/08/2014         I have explained the risks and benefits of endoscopy procedures to the patient including but not limited to bleeding, perforation, infection, and death.    Thank you so much for allowing me to participate in the care of Mayra Cabral MD

## 2018-01-24 NOTE — ANESTHESIA PREPROCEDURE EVALUATION
01/24/2018  Mayra Crawford is a 57 y.o., female.    Anesthesia Evaluation    I have reviewed the Patient Summary Reports.    I have reviewed the Nursing Notes.   I have reviewed the Medications.     Review of Systems  Anesthesia Hx:  No problems with previous Anesthesia  Denies Family Hx of Anesthesia complications.   Denies Personal Hx of Anesthesia complications.   Cardiovascular:   Exercise tolerance: good  Functional Capacity good / => 4 METS    Pulmonary:   Asthma    Renal/:   Chronic Renal Disease    Endocrine:   Hypothyroidism  Metabolic Disorders, Obesity / BMI > 30      Physical Exam   Airway/Jaw/Neck:  Airway Findings: Mouth Opening: Normal Tongue: Normal  General Airway Assessment: Adult, Good  TM Distance: Normal, at least 6 cm       Chest/Lungs:  Chest/Lungs Findings: Normal Respiratory Rate         Mental Status:  Mental Status Findings:  Cooperative, Alert and Oriented         Anesthesia Plan  Type of Anesthesia, risks & benefits discussed:  Anesthesia Type:  general  Patient's Preference: General  Intra-op Monitoring Plan: standard ASA monitors  Intra-op Monitoring Plan Comments:   Post Op Pain Control Plan:   Post Op Pain Control Plan Comments: Per primary service  Induction:   IV  Beta Blocker:  Patient is not currently on a Beta-Blocker (No further documentation required).       Informed Consent: Patient understands risks and agrees with Anesthesia plan.  Questions answered. Anesthesia consent signed with patient.  ASA Score: 2     Day of Surgery Review of History & Physical:    H&P update referred to the surgeon.         Ready For Surgery From Anesthesia Perspective.

## 2018-01-24 NOTE — PROVATION PATIENT INSTRUCTIONS
Discharge Summary/Instructions after an Endoscopic Procedure  Patient Name: Mayra Crawford  Patient MRN: [id number]  Patient YOB: 1960 Wednesday, January 24, 2018  Joe Cabral MD  RESTRICTIONS:  During your procedure today, you received medications for sedation.  These   medications may affect your judgment, balance and coordination.  Therefore,   for 24 hours, you have the following restrictions:   - DO NOT drive a car, operate machinery, make legal/financial decisions,   sign important papers or drink alcohol.    ACTIVITY:  The following day: return to full activity including work, except no heavy   lifting, straining or running for 3 days if polyps were removed.  DIET:  Eat and drink normally unless instructed otherwise.     TREATMENT FOR COMMON SIDE EFFECTS:  - Mild abdominal pain, belching, bloating or excessive gas: rest, eat   lightly and use a heating pad.  - Sore Throat: treat with throat lozenges and/or gargle with warm salt   water.  SYMPTOMS TO WATCH FOR AND REPORT TO YOUR PHYSICIAN:  1. Abdominal pain or bloating, other than gas cramps.  2. Chest pain.  3. Back pain.  4. Chills or fever occurring within 24 hours after the procedure.  5. Rectal bleeding, which would show as bright red, maroon, or black stools.   (A tablespoon of blood from the rectum is not serious, especially if   hemorrhoids are present.)  6. Vomiting.  7. Weakness or dizziness.  8. Because air was used during the procedure, expelling large amounts of air   from your rectum or belching is normal.  9. If a bowel prep was taken, you may not have a bowel movement for 1-3   days.  This is normal.  GO DIRECTLY TO THE NEAREST EMERGENCY ROOM IF YOU HAVE ANY OF THE FOLLOWING:      Difficulty breathing  Chills and/or fever over 101 F   Persistent vomiting and/or vomiting blood   Severe abdominal pain   Severe chest pain   Black, tarry stools   Bleeding- more than one tablespoon   Any other symptom or condition that you may feel  needs urgent attention  Your doctor recommends these additional instructions:  If any biopsies were taken, your doctor s clinic will contact you in 1 to 2   weeks with any results.  You have a contact number available for emergencies.  The signs and symptoms   of potential delayed complications were discussed with you.  You may return   to normal activities tomorrow.  Written discharge instructions were   provided to you.   You are being discharged to home.   Resume your previous diet.   Continue your present medications.   We are waiting for your pathology results.  For questions, problems or results please call your physician - Joe Cabral MD at Work:  [phone].  [facility], EMERGENCY ROOM PHONE NUMBER: [phone]  IF A COMPLICATION OR EMERGENCY SITUATION ARISES AND YOU ARE UNABLE TO REACH   YOUR PHYSICIAN - GO DIRECTLY TO THE EMERGENCY ROOM.  Joe Cabral MD  1/24/2018 10:48:26 AM  This report has been verified and signed electronically.

## 2018-01-24 NOTE — PRE-PROCEDURE INSTRUCTIONS
Notified anesthesia that pt is getting over a cold and that she has a hx of asthma and the pt asked could she take a puff of her inhaler. Anesthesia said that was ok. Pt took a2 puffs of her inhaler Pro Air.Pts lungs sound clear no wheezing noted and no sob.

## 2018-01-29 ENCOUNTER — TELEPHONE (OUTPATIENT)
Dept: GASTROENTEROLOGY | Facility: CLINIC | Age: 58
End: 2018-01-29

## 2018-01-29 NOTE — TELEPHONE ENCOUNTER
----- Message from Joe Cabral MD sent at 1/29/2018  3:24 PM CST -----  Biopsies are normal. Please schedule a Gen surgery consult to discuss large gall stones.

## 2018-01-31 ENCOUNTER — TELEPHONE (OUTPATIENT)
Dept: ENDOSCOPY | Facility: HOSPITAL | Age: 58
End: 2018-01-31

## 2018-01-31 ENCOUNTER — TELEPHONE (OUTPATIENT)
Dept: HEMATOLOGY/ONCOLOGY | Facility: CLINIC | Age: 58
End: 2018-01-31

## 2018-01-31 ENCOUNTER — TELEPHONE (OUTPATIENT)
Dept: GASTROENTEROLOGY | Facility: CLINIC | Age: 58
End: 2018-01-31

## 2018-01-31 NOTE — TELEPHONE ENCOUNTER
Left message with patient that she will have a return appointment in one year with Dr. Lewis.  Which that will be 9/2018.

## 2018-01-31 NOTE — TELEPHONE ENCOUNTER
----- Message from Arianna Valerio sent at 1/31/2018  1:21 PM CST -----  Contact: self - [phone]  Misha - returning your call re test results - please call patient at [phone]

## 2018-03-15 ENCOUNTER — OFFICE VISIT (OUTPATIENT)
Dept: SURGERY | Facility: CLINIC | Age: 58
End: 2018-03-15
Payer: COMMERCIAL

## 2018-03-15 ENCOUNTER — OFFICE VISIT (OUTPATIENT)
Dept: GASTROENTEROLOGY | Facility: CLINIC | Age: 58
End: 2018-03-15
Payer: COMMERCIAL

## 2018-03-15 VITALS
HEIGHT: 65 IN | DIASTOLIC BLOOD PRESSURE: 78 MMHG | SYSTOLIC BLOOD PRESSURE: 123 MMHG | BODY MASS INDEX: 29.72 KG/M2 | WEIGHT: 178.38 LBS | HEART RATE: 68 BPM

## 2018-03-15 VITALS
HEART RATE: 71 BPM | DIASTOLIC BLOOD PRESSURE: 87 MMHG | SYSTOLIC BLOOD PRESSURE: 137 MMHG | HEIGHT: 65 IN | BODY MASS INDEX: 29.58 KG/M2 | WEIGHT: 177.56 LBS

## 2018-03-15 DIAGNOSIS — K80.20 GALLSTONES: ICD-10-CM

## 2018-03-15 DIAGNOSIS — E03.9 HYPOTHYROIDISM, UNSPECIFIED TYPE: Primary | ICD-10-CM

## 2018-03-15 PROCEDURE — 99214 OFFICE O/P EST MOD 30 MIN: CPT | Mod: S$GLB,,, | Performed by: INTERNAL MEDICINE

## 2018-03-15 PROCEDURE — 3074F SYST BP LT 130 MM HG: CPT | Mod: CPTII,S$GLB,, | Performed by: INTERNAL MEDICINE

## 2018-03-15 PROCEDURE — 99999 PR PBB SHADOW E&M-EST. PATIENT-LVL III: CPT | Mod: PBBFAC,,, | Performed by: INTERNAL MEDICINE

## 2018-03-15 PROCEDURE — 99213 OFFICE O/P EST LOW 20 MIN: CPT | Mod: S$GLB,,, | Performed by: SURGERY

## 2018-03-15 PROCEDURE — 99999 PR PBB SHADOW E&M-EST. PATIENT-LVL III: CPT | Mod: PBBFAC,,, | Performed by: SURGERY

## 2018-03-15 PROCEDURE — 3079F DIAST BP 80-89 MM HG: CPT | Mod: CPTII,S$GLB,, | Performed by: SURGERY

## 2018-03-15 PROCEDURE — 3075F SYST BP GE 130 - 139MM HG: CPT | Mod: CPTII,S$GLB,, | Performed by: SURGERY

## 2018-03-15 PROCEDURE — 3078F DIAST BP <80 MM HG: CPT | Mod: CPTII,S$GLB,, | Performed by: INTERNAL MEDICINE

## 2018-03-15 NOTE — LETTER
[facility] - General Surgery  [address]  [place] [address]  Phone: [phone] March 15, 2018      Joe Cabral MD  [address] [facility] [address]    Patient: Mayra Crawford   MR Number: [id number]   YOB: 1960   Date of Visit: 3/15/2018     Dear Dr. Cabral:    Thank you for referring Mayra Crawford to me for evaluation. Below are the relevant portions of my assessment and plan of care.    Assessment/Plan:   Patient is a 57-year-old female with cholelithiasis and biliary colic, although seemingly improved recently.    PLAN:   - She has elected to discuss her options with her family  - Risks, benefits, alternatives to the procedure and conservative management discussed with the patient  - All questions and concerns addressed  - She will inform us if she would like to proceed with cholecystectomy  - Return to clinic as needed    If you have questions, please do not hesitate to call me. I look forward to following Mayra along with you.    Sincerely,      Blake Parsons MD   Section Head - General, Laparoscopic, Bariatric  Acute Care and Oncologic Surgery   - Surgical Weight Loss Program  [facility]    WSR/sam

## 2018-03-15 NOTE — PROGRESS NOTES
I have seen the patient, reviewed the Resident's history and physical, assessment and plan. I have personally interviewed and examined the patient at bedside and: agree with the findings.     Epigastric pain, apparently improved with synthroid along with constipation, and large gallstones.  On pe she has some guarding in the ruq but denies any tenderness.  I suggest lap alexey for biliary colic/likely chronic alexey and she is thinking about it.

## 2018-03-15 NOTE — Clinical Note
March 15, 2018      Joe Cabral MD  [address] [facility] [address]           [facility]  [address] [facility]  [facility] [address]  Phone: [phone]          Patient: Mayra Crawford   MR Number: [id number]   YOB: 1960   Date of Visit: 3/15/2018       Dear Dr. Joe Cabral:    Thank you for referring Mayra Crawford to me for evaluation. Attached you will find relevant portions of my assessment and plan of care.    If you have questions, please do not hesitate to call me. I look forward to following Mayra Crawford along with you.    Sincerely,    Blake Parsons MD    Enclosure  CC:  No Recipients    If you would like to receive this communication electronically, please contact [email or web address] or [phone] to request more information on [facility] access.    For providers and/or their staff who would like to refer a patient to [facility], please contact us through our one-stop-shop provider referral line, [facility], at [phone].    If you feel you have received this communication in error or would no longer like to receive these types of communications, please e-mail [email or web address]

## 2018-03-15 NOTE — PROGRESS NOTES
REASON FOR VISIT:  Upper abdominal pain and constipation.    HISTORY OF PRESENT ILLNESS:  Ms. Crawford is a 57-year-old who was last seen by me   in October 2017 for upper abdominal pain, bloating, and constipation.  She was   found to have hypothyroidism, was started on Synthroid and has been doing much   better since she does not have to use [place] often.  An ultrasound was done   because of upper abdominal pain and there were two large stones identified in   the gallbladder without any evidence of biliary ductal dilation or   cholecystitis.  An upper endoscopy done was unremarkable without any evidence of   H. pylori or peptic ulcer disease.  She visited with Dr. Parsons today to   discuss cholecystectomy and I have also suggested to her that she should   definitely consider laparoscopic cholecystectomy given the possibility of   choledocholithiasis in the future and potential complications, i.e.,   cholecystitis/pancreatitis.  Overall, she is doing well.  Today, we discussed   about pursuing thyroid labs since it has been about six months since her last   lab draw.  Otherwise, no new complaints.    PAST MEDICAL, SURGICAL, SOCIAL AND FAMILY HISTORY:  Reviewed.    MEDICATIONS AND ALLERGIES:  Reviewed.    REVIEW OF SYSTEMS:  CONSTITUTIONAL:  No fever, no chills.  No weight loss.  Appetite is normal.  EYES:  No visual changes.  ENT:  No odynophagia or hoarseness of voice.  CARDIOVASCULAR:  No angina or palpitation.  RESPIRATORY:  No shortness of breath or wheezing.  GASTROINTESTINAL:  See HPI.    PHYSICAL EXAMINATION:  VITAL SIGNS:  See EPIC.  GENERAL:  Awake, alert and oriented x3, in no acute distress.  No physical exam done today.  About 25 minutes spent with the patient and   family, more than 50% in counseling regarding potential complications of   gallstones.    IMPRESSION:    1. Chronic constipation -- improved.  2. Cholelithiasis  3. Hypothyroidism    RECOMMENDATIONS:  1.  Check free T4 and TSH.  2.   Follow with Dr. Parsons to consider a laparoscopic cholecystectomy.  3.  Follow up in GI Clinic p.r.n.  4.  The patient needs to follow with Dr. Delfin Lewis for any titrations of   Synthroid.      ACT/HN  dd: 03/15/2018 10:26:02 (CDT)  td: 03/16/2018 00:43:41 (CDT)  Doc ID   #[id number]  Job ID #[id number]    CC:

## 2018-03-15 NOTE — PROGRESS NOTES
[facility] - General Surgery  General Surgery  History & Physical      Subjective:     Chief Complaint: Gallstones    History of Present Illness:  Patient is a 57 y.o. female who presents to clinic today for evaluation of gallstones. She reports an approx 1 year history of intermittent post-prandial cramping/pressure mid-epigastric abdominal pain. She also has had chronic constipation that recently improved with addition of Synthroid in Jan 2018 (2 months ago). She reports that in that time, her upper abdominal pain episodes have improved as well. She has had two occurrences in the interim. Reports some associated bloating. Denies nausea, vomiting. No fever. She was seen by GI (Dr. Alcaraz), who obtained an abdominal ultrasound and performed an EGD. EGD showed only some mild erosive gastritis and was negative for H pylori. Ultrasound showed gallstones with largest measuring 2.5 cm. Denies shoulder pain. She is hesitant to have any surgical intervention at this time.    Prior abdominal surgeries include hysterectomy and BSO.  No antiplatelet or anticoagulant agents.  Never smoker.      Current Outpatient Prescriptions on File Prior to Visit   Medication Sig    albuterol 90 mcg/actuation inhaler Inhale 2 puffs into the lungs every 6 (six) hours as needed for Wheezing.    azithromycin (ZITHROMAX Z-TALITA) 250 MG tablet Take 2 tablets (500 mg) on  Day 1,  followed by 1 tablet (250 mg) once daily on Days 2 through 5.    CETIRIZINE HCL (ZYRTEC ORAL) daily as needed.     cloNIDine (CATAPRES) 0.1 MG tablet TAKE 1 TABLET BY MOUTH EVERY DAY    docusate sodium (STOOL SOFTENER) 50 MG capsule Take by mouth 2 (two) times daily.    fluticasone-salmeterol 250-50 mcg/dose (ADVAIR DISKUS) 250-50 mcg/dose diskus inhaler Inhale 1 puff into the lungs 2 (two) times daily as needed. 1 Disk with Device Inhalation Twice a day    levothyroxine (SYNTHROID) 50 MCG tablet Take 1 tablet (50 mcg total) by mouth once daily.    meclizine (ANTIVERT)  12.5 mg tablet Take 1 tablet (12.5 mg total) by mouth every 6 (six) hours. 1 Tablet Oral Three times a day    mometasone (ASMANEX TWISTHALER) 220 mcg (60 doses) AePB 2 puffs inhaled once daily. Disp one canister    sertraline (ZOLOFT) 50 MG tablet Take 1 tablet (50 mg total) by mouth once daily.    tolterodine (DETROL LA) 4 MG 24 hr capsule TAKE 1 CAPSULE BY MOUTH EVERY MORNING    epinephrine (EPIPEN) 0.3 mg/0.3 mL (1:1,000) AtIn Inject 0.3 mLs (0.3 mg total) into the muscle once. 1 Pen Injector Intramuscular .  use as directed     No current facility-administered medications on file prior to visit.      Review of patient's allergies indicates:   Allergen Reactions    Penicillins      Other reaction(s): Rash    Other Rash and Other (See Comments)     Other reaction(s): Swelling  Other reaction(s): Sneezing  Other reaction(s): Shortness of breath    Pt reports she is allergic to meat     Past Medical History:   Diagnosis Date    Asthma     Breast cancer 1992    left breast IDC    Chronic constipation     Genetic testing 12/2006    BRACAnalysis with rearrangement negative for mutation    Heart murmur     kidney stone      Past Surgical History:   Procedure Laterality Date    anal fissure      BILATERAL SALPINGOOPHORECTOMY      BREAST BIOPSY  ~1995    right breast excisional biopsy- benign    BREAST LUMPECTOMY  1992    left lumpectomy with ALND for IDC    CERVICAL CONIZATION   W/ LASER      HYSTERECTOMY       Family History     Problem Relation (Age of Onset)    Breast cancer Maternal Aunt, Maternal Aunt, Paternal Aunt    Cancer Father, Maternal Aunt    Diabetes Father    Heart disease Father    Hypertension Mother    Nephrolithiasis Mother        Social History Main Topics    Smoking status: Never Smoker    Smokeless tobacco: Never Used    Alcohol use No    Drug use: No    Sexual activity: Yes     Partners: Male     Review of Systems   Constitutional: Negative for activity change, chills,  fatigue and fever.   HENT: Negative for congestion, rhinorrhea and sore throat.    Eyes: Negative for visual disturbance.   Respiratory: Negative for cough, shortness of breath and wheezing.    Cardiovascular: Negative for chest pain, palpitations and leg swelling.   Gastrointestinal: Negative for abdominal distention, abdominal pain, constipation, diarrhea, nausea and vomiting.   Genitourinary: Negative for dysuria, frequency and hematuria.   Musculoskeletal: Negative for arthralgias and myalgias.   Skin: Negative for color change, rash and wound.   Neurological: Negative for syncope, weakness and numbness.   Hematological: Does not bruise/bleed easily.   Psychiatric/Behavioral: Negative for behavioral problems and confusion.        Objective:     Vital Signs (Most Recent):  Pulse: 71 (03/15/18 0851)  BP: 137/87 (03/15/18 0851)     Weight: 80.6 kg (177 lb 9.3 oz)  Body mass index is 29.55 kg/m².    Physical Exam   Constitutional: She is oriented to person, place, and time. She appears well-developed and well-nourished. No distress.   HENT:   Head: Normocephalic and atraumatic.   Eyes: EOM are normal.   Neck: Normal range of motion.   Cardiovascular: Normal rate, regular rhythm and intact distal pulses.    Pulmonary/Chest: Effort normal. No respiratory distress. She has no wheezes.   Abdominal: Soft. She exhibits no distension. There is no tenderness.   Prior incisions well healed   Musculoskeletal: She exhibits no edema or deformity.   Neurological: She is alert and oriented to person, place, and time.   Skin: Skin is warm and dry. No rash noted. She is not diaphoretic. No erythema.   Psychiatric: She has a normal mood and affect. Her behavior is normal.   Nursing note and vitals reviewed.        Significant Diagnostics:  Ultrasound abdomen (1/10/18): The gallbladder is not significantly distended. Two large shadowing stones are identified measuring up to 2.5 cm. There is no gallbladder wall thickening,  pericholecystic fluid or sonographic Delgado's sign. There is no biliary dilatation and the common bile duct measures 3 mm.     EGD (1/24/18): Non-bleeding erosive gastropathy. Normal examined duodenum.      Assessment/Plan:   58 y/o female with cholelithiasis and biliary colic, although seemingly improved recently    - She has elected to discuss her options with her family  - Risks, benefits, alternatives to the procedure and conservative management discussed with the patient  - All questions and concerns addressed  - She will inform us if she would like to proceed with cholecystectomy  - Return to clinic as needed      Nishant Reyna MD  Surgery Resident, PGY-III  Pager: [phone]  3/15/2018 8:47 AM

## 2018-03-21 ENCOUNTER — LAB VISIT (OUTPATIENT)
Dept: LAB | Facility: HOSPITAL | Age: 58
End: 2018-03-21
Attending: INTERNAL MEDICINE
Payer: COMMERCIAL

## 2018-03-21 ENCOUNTER — TELEPHONE (OUTPATIENT)
Dept: GASTROENTEROLOGY | Facility: CLINIC | Age: 58
End: 2018-03-21

## 2018-03-21 DIAGNOSIS — E03.9 HYPOTHYROIDISM, UNSPECIFIED TYPE: ICD-10-CM

## 2018-03-21 LAB
T4 FREE SERPL-MCNC: 0.82 NG/DL
TSH SERPL DL<=0.005 MIU/L-ACNC: 1.29 UIU/ML

## 2018-03-21 PROCEDURE — 84443 ASSAY THYROID STIM HORMONE: CPT

## 2018-03-21 PROCEDURE — 84439 ASSAY OF FREE THYROXINE: CPT

## 2018-03-21 PROCEDURE — 36415 COLL VENOUS BLD VENIPUNCTURE: CPT

## 2018-03-21 NOTE — TELEPHONE ENCOUNTER
----- Message from Joe Cabral MD sent at 3/21/2018 10:04 AM CDT -----  Thyroid hormone levels are normal now.

## 2018-03-23 ENCOUNTER — TELEPHONE (OUTPATIENT)
Dept: SURGERY | Facility: CLINIC | Age: 58
End: 2018-03-23

## 2018-03-23 DIAGNOSIS — K80.50 BILIARY COLIC: Primary | ICD-10-CM

## 2018-04-03 ENCOUNTER — TELEPHONE (OUTPATIENT)
Dept: SURGERY | Facility: CLINIC | Age: 58
End: 2018-04-03

## 2018-04-03 NOTE — TELEPHONE ENCOUNTER
----- Message from Elysatish Zamorano sent at 4/3/2018 11:36 AM CDT -----  [phone]//pt states that she needs to speak with nurse in ref to not receiving any info about her surgery//please call/thank you

## 2018-04-11 ENCOUNTER — TELEPHONE (OUTPATIENT)
Dept: SURGERY | Facility: CLINIC | Age: 58
End: 2018-04-11

## 2018-04-11 NOTE — TELEPHONE ENCOUNTER
----- Message from Delilah Salazar sent at 4/11/2018  1:16 PM CDT -----  Contact: Pt.Self   Pt. States she would like to speak to nurse  in reference to surgery and paperwork that needs to be filled out please call Pt.back @ [phone] Thank You :)

## 2018-04-17 ENCOUNTER — TELEPHONE (OUTPATIENT)
Dept: SURGERY | Facility: CLINIC | Age: 58
End: 2018-04-17

## 2018-04-18 ENCOUNTER — ANESTHESIA (OUTPATIENT)
Dept: SURGERY | Facility: HOSPITAL | Age: 58
End: 2018-04-18
Payer: COMMERCIAL

## 2018-04-18 ENCOUNTER — SURGERY (OUTPATIENT)
Age: 58
End: 2018-04-18

## 2018-04-18 ENCOUNTER — ANESTHESIA EVENT (OUTPATIENT)
Dept: SURGERY | Facility: HOSPITAL | Age: 58
End: 2018-04-18
Payer: COMMERCIAL

## 2018-04-18 ENCOUNTER — HOSPITAL ENCOUNTER (OUTPATIENT)
Facility: HOSPITAL | Age: 58
Discharge: HOME | End: 2018-04-18
Attending: SURGERY | Admitting: SURGERY
Payer: COMMERCIAL

## 2018-04-18 VITALS
HEIGHT: 65 IN | BODY MASS INDEX: 29.66 KG/M2 | TEMPERATURE: 98 F | RESPIRATION RATE: 12 BRPM | OXYGEN SATURATION: 96 % | WEIGHT: 178 LBS | SYSTOLIC BLOOD PRESSURE: 119 MMHG | DIASTOLIC BLOOD PRESSURE: 66 MMHG | HEART RATE: 78 BPM

## 2018-04-18 DIAGNOSIS — K80.20 GALLSTONES: Primary | ICD-10-CM

## 2018-04-18 PROCEDURE — 88304 TISSUE EXAM BY PATHOLOGIST: CPT | Performed by: PATHOLOGY

## 2018-04-18 PROCEDURE — 27000221 HC OXYGEN, UP TO 24 HOURS

## 2018-04-18 PROCEDURE — 37000009 HC ANESTHESIA EA ADD 15 MINS: Performed by: SURGERY

## 2018-04-18 PROCEDURE — 63600175 PHARM REV CODE 636 W HCPCS: Performed by: ANESTHESIOLOGY

## 2018-04-18 PROCEDURE — 36000709 HC OR TIME LEV III EA ADD 15 MIN: Performed by: SURGERY

## 2018-04-18 PROCEDURE — 37000008 HC ANESTHESIA 1ST 15 MINUTES: Performed by: SURGERY

## 2018-04-18 PROCEDURE — 47562 LAPAROSCOPIC CHOLECYSTECTOMY: CPT | Mod: ,,, | Performed by: SURGERY

## 2018-04-18 PROCEDURE — D9220A PRA ANESTHESIA: Mod: ,,, | Performed by: ANESTHESIOLOGY

## 2018-04-18 PROCEDURE — 88304 TISSUE EXAM BY PATHOLOGIST: CPT | Mod: 26,,, | Performed by: PATHOLOGY

## 2018-04-18 PROCEDURE — 71000015 HC POSTOP RECOV 1ST HR: Performed by: SURGERY

## 2018-04-18 PROCEDURE — 25000003 PHARM REV CODE 250: Performed by: ANESTHESIOLOGY

## 2018-04-18 PROCEDURE — 25000003 PHARM REV CODE 250: Performed by: SURGERY

## 2018-04-18 PROCEDURE — 71000039 HC RECOVERY, EACH ADD'L HOUR: Performed by: SURGERY

## 2018-04-18 PROCEDURE — 27201423 OPTIME MED/SURG SUP & DEVICES STERILE SUPPLY: Performed by: SURGERY

## 2018-04-18 PROCEDURE — 71000033 HC RECOVERY, INTIAL HOUR: Performed by: SURGERY

## 2018-04-18 PROCEDURE — S0077 INJECTION, CLINDAMYCIN PHOSP: HCPCS | Performed by: SURGERY

## 2018-04-18 PROCEDURE — 36000708 HC OR TIME LEV III 1ST 15 MIN: Performed by: SURGERY

## 2018-04-18 RX ORDER — ONDANSETRON 4 MG/1
8 TABLET, ORALLY DISINTEGRATING ORAL EVERY 8 HOURS PRN
Qty: 5 TABLET | Refills: 0 | Status: ON HOLD | OUTPATIENT
Start: 2018-04-18 | End: 2024-03-28 | Stop reason: CLARIF

## 2018-04-18 RX ORDER — LIDOCAINE HYDROCHLORIDE 10 MG/ML
1 INJECTION, SOLUTION EPIDURAL; INFILTRATION; INTRACAUDAL; PERINEURAL ONCE
Status: COMPLETED | OUTPATIENT
Start: 2018-04-18 | End: 2018-04-18

## 2018-04-18 RX ORDER — PHENYLEPHRINE HYDROCHLORIDE 10 MG/ML
INJECTION INTRAVENOUS
Status: DISCONTINUED | OUTPATIENT
Start: 2018-04-18 | End: 2018-04-18

## 2018-04-18 RX ORDER — BUPIVACAINE HYDROCHLORIDE 2.5 MG/ML
INJECTION, SOLUTION EPIDURAL; INFILTRATION; INTRACAUDAL
Status: DISCONTINUED | OUTPATIENT
Start: 2018-04-18 | End: 2018-04-18 | Stop reason: HOSPADM

## 2018-04-18 RX ORDER — HYDROCODONE BITARTRATE AND ACETAMINOPHEN 5; 325 MG/1; MG/1
1 TABLET ORAL EVERY 6 HOURS PRN
Qty: 15 TABLET | Refills: 0 | Status: SHIPPED | OUTPATIENT
Start: 2018-04-18 | End: 2018-12-04 | Stop reason: ALTCHOICE

## 2018-04-18 RX ORDER — SODIUM CHLORIDE 9 MG/ML
INJECTION, SOLUTION INTRAVENOUS CONTINUOUS
Status: DISCONTINUED | OUTPATIENT
Start: 2018-04-18 | End: 2018-04-18 | Stop reason: HOSPADM

## 2018-04-18 RX ORDER — PROPOFOL 10 MG/ML
VIAL (ML) INTRAVENOUS
Status: DISCONTINUED | OUTPATIENT
Start: 2018-04-18 | End: 2018-04-18

## 2018-04-18 RX ORDER — SCOLOPAMINE TRANSDERMAL SYSTEM 1 MG/1
1 PATCH, EXTENDED RELEASE TRANSDERMAL
Status: DISCONTINUED | OUTPATIENT
Start: 2018-04-18 | End: 2018-04-18 | Stop reason: HOSPADM

## 2018-04-18 RX ORDER — ONDANSETRON 8 MG/1
8 TABLET, ORALLY DISINTEGRATING ORAL EVERY 8 HOURS PRN
Status: DISCONTINUED | OUTPATIENT
Start: 2018-04-18 | End: 2018-04-18 | Stop reason: HOSPADM

## 2018-04-18 RX ORDER — HYDROCODONE BITARTRATE AND ACETAMINOPHEN 5; 325 MG/1; MG/1
1 TABLET ORAL EVERY 4 HOURS PRN
Status: DISCONTINUED | OUTPATIENT
Start: 2018-04-18 | End: 2018-04-18 | Stop reason: HOSPADM

## 2018-04-18 RX ORDER — LIDOCAINE HCL/PF 100 MG/5ML
SYRINGE (ML) INTRAVENOUS
Status: DISCONTINUED | OUTPATIENT
Start: 2018-04-18 | End: 2018-04-18

## 2018-04-18 RX ORDER — LIDOCAINE HYDROCHLORIDE 40 MG/ML
SOLUTION TOPICAL
Status: DISCONTINUED | OUTPATIENT
Start: 2018-04-18 | End: 2018-04-18

## 2018-04-18 RX ORDER — MIDAZOLAM HYDROCHLORIDE 1 MG/ML
INJECTION, SOLUTION INTRAMUSCULAR; INTRAVENOUS
Status: DISCONTINUED | OUTPATIENT
Start: 2018-04-18 | End: 2018-04-18

## 2018-04-18 RX ORDER — ONDANSETRON 2 MG/ML
INJECTION INTRAMUSCULAR; INTRAVENOUS
Status: DISCONTINUED | OUTPATIENT
Start: 2018-04-18 | End: 2018-04-18

## 2018-04-18 RX ORDER — MEPERIDINE HYDROCHLORIDE 50 MG/ML
INJECTION INTRAMUSCULAR; INTRAVENOUS; SUBCUTANEOUS
Status: COMPLETED
Start: 2018-04-18 | End: 2018-04-18

## 2018-04-18 RX ORDER — NEOSTIGMINE METHYLSULFATE 1 MG/ML
INJECTION, SOLUTION INTRAVENOUS
Status: DISCONTINUED | OUTPATIENT
Start: 2018-04-18 | End: 2018-04-18

## 2018-04-18 RX ORDER — HYDROCODONE BITARTRATE AND ACETAMINOPHEN 5; 325 MG/1; MG/1
TABLET ORAL
Status: COMPLETED
Start: 2018-04-18 | End: 2018-04-18

## 2018-04-18 RX ORDER — ACETAMINOPHEN 10 MG/ML
INJECTION, SOLUTION INTRAVENOUS
Status: DISCONTINUED | OUTPATIENT
Start: 2018-04-18 | End: 2018-04-18

## 2018-04-18 RX ORDER — KETOROLAC TROMETHAMINE 30 MG/ML
INJECTION, SOLUTION INTRAMUSCULAR; INTRAVENOUS
Status: DISCONTINUED | OUTPATIENT
Start: 2018-04-18 | End: 2018-04-18

## 2018-04-18 RX ORDER — FENTANYL CITRATE 50 UG/ML
25 INJECTION, SOLUTION INTRAMUSCULAR; INTRAVENOUS EVERY 5 MIN PRN
Status: DISCONTINUED | OUTPATIENT
Start: 2018-04-18 | End: 2018-04-18 | Stop reason: HOSPADM

## 2018-04-18 RX ORDER — GLYCOPYRROLATE 0.2 MG/ML
INJECTION INTRAMUSCULAR; INTRAVENOUS
Status: DISCONTINUED | OUTPATIENT
Start: 2018-04-18 | End: 2018-04-18

## 2018-04-18 RX ORDER — DEXAMETHASONE SODIUM PHOSPHATE 4 MG/ML
INJECTION, SOLUTION INTRA-ARTICULAR; INTRALESIONAL; INTRAMUSCULAR; INTRAVENOUS; SOFT TISSUE
Status: DISCONTINUED | OUTPATIENT
Start: 2018-04-18 | End: 2018-04-18

## 2018-04-18 RX ORDER — LIDOCAINE HYDROCHLORIDE 10 MG/ML
1 INJECTION, SOLUTION EPIDURAL; INFILTRATION; INTRACAUDAL; PERINEURAL ONCE
Status: DISCONTINUED | OUTPATIENT
Start: 2018-04-18 | End: 2018-04-18 | Stop reason: HOSPADM

## 2018-04-18 RX ORDER — SODIUM CHLORIDE 0.9 % (FLUSH) 0.9 %
3 SYRINGE (ML) INJECTION
Status: DISCONTINUED | OUTPATIENT
Start: 2018-04-18 | End: 2018-04-18 | Stop reason: HOSPADM

## 2018-04-18 RX ORDER — CLINDAMYCIN PHOSPHATE 900 MG/50ML
900 INJECTION, SOLUTION INTRAVENOUS
Status: COMPLETED | OUTPATIENT
Start: 2018-04-18 | End: 2018-04-18

## 2018-04-18 RX ORDER — MEPERIDINE HYDROCHLORIDE 50 MG/ML
12.5 INJECTION INTRAMUSCULAR; INTRAVENOUS; SUBCUTANEOUS ONCE
Status: COMPLETED | OUTPATIENT
Start: 2018-04-18 | End: 2018-04-18

## 2018-04-18 RX ADMIN — SODIUM CHLORIDE: 0.9 INJECTION, SOLUTION INTRAVENOUS at 07:04

## 2018-04-18 RX ADMIN — CLINDAMYCIN IN 5 PERCENT DEXTROSE 900 MG: 18 INJECTION, SOLUTION INTRAVENOUS at 08:04

## 2018-04-18 RX ADMIN — MIDAZOLAM HYDROCHLORIDE 2 MG: 1 INJECTION, SOLUTION INTRAMUSCULAR; INTRAVENOUS at 07:04

## 2018-04-18 RX ADMIN — PROPOFOL 180 MG: 10 INJECTION, EMULSION INTRAVENOUS at 08:04

## 2018-04-18 RX ADMIN — LIDOCAINE HYDROCHLORIDE 160 MG: 40 SPRAY LARYNGEAL; TRANSTRACHEAL at 08:04

## 2018-04-18 RX ADMIN — MEPERIDINE HYDROCHLORIDE 12.5 MG: 50 INJECTION INTRAMUSCULAR; INTRAVENOUS; SUBCUTANEOUS at 10:04

## 2018-04-18 RX ADMIN — LIDOCAINE HYDROCHLORIDE 100 MG: 20 INJECTION, SOLUTION INTRAVENOUS at 08:04

## 2018-04-18 RX ADMIN — KETOROLAC TROMETHAMINE 30 MG: 30 INJECTION, SOLUTION INTRAMUSCULAR; INTRAVENOUS at 08:04

## 2018-04-18 RX ADMIN — BUPIVACAINE HYDROCHLORIDE 6 ML: 2.5 INJECTION, SOLUTION EPIDURAL; INFILTRATION; INTRACAUDAL; PERINEURAL at 08:04

## 2018-04-18 RX ADMIN — DEXAMETHASONE SODIUM PHOSPHATE 8 MG: 4 INJECTION, SOLUTION INTRAMUSCULAR; INTRAVENOUS at 08:04

## 2018-04-18 RX ADMIN — HYDROCODONE BITARTRATE AND ACETAMINOPHEN 1 TABLET: 5; 325 TABLET ORAL at 09:04

## 2018-04-18 RX ADMIN — LIDOCAINE HYDROCHLORIDE 10 MG: 10 INJECTION, SOLUTION EPIDURAL; INFILTRATION; INTRACAUDAL; PERINEURAL at 07:04

## 2018-04-18 RX ADMIN — ACETAMINOPHEN 1000 MG: 10 INJECTION, SOLUTION INTRAVENOUS at 08:04

## 2018-04-18 RX ADMIN — FENTANYL CITRATE 25 MCG: 50 INJECTION INTRAMUSCULAR; INTRAVENOUS at 09:04

## 2018-04-18 RX ADMIN — ONDANSETRON 4 MG: 2 INJECTION INTRAMUSCULAR; INTRAVENOUS at 08:04

## 2018-04-18 RX ADMIN — PHENYLEPHRINE HYDROCHLORIDE 200 MCG: 10 INJECTION INTRAVENOUS at 08:04

## 2018-04-18 RX ADMIN — NEOSTIGMINE METHYLSULFATE 5 MG: 1 INJECTION INTRAVENOUS at 08:04

## 2018-04-18 RX ADMIN — SCOPALAMINE 1 PATCH: 1 PATCH, EXTENDED RELEASE TRANSDERMAL at 07:04

## 2018-04-18 RX ADMIN — PHENYLEPHRINE HYDROCHLORIDE 100 MCG: 10 INJECTION INTRAVENOUS at 08:04

## 2018-04-18 RX ADMIN — GLYCOPYRROLATE 0.6 MG: 0.2 INJECTION, SOLUTION INTRAMUSCULAR; INTRAVENOUS at 08:04

## 2018-04-18 RX ADMIN — FENTANYL CITRATE 150 MCG: 50 INJECTION INTRAMUSCULAR; INTRAVENOUS at 08:04

## 2018-04-18 NOTE — ANESTHESIA PREPROCEDURE EVALUATION
Pre-operative evaluation for Procedure(s) (LRB):  CHOLECYSTECTOMY-LAPAROSCOPIC (N/A)    Mayra Crawford is a 57 y.o. female       Patient Active Problem List   Diagnosis    Unspecified asthma(493.90)    Rhinitis, allergic    Food allergy    History of breast cancer    Nephrolithiasis    Colon cancer screening    Subcutaneous nodule    Mild intermittent asthma without complication    Acquired hypothyroidism    Upper abdominal pain    Gallstones       Review of patient's allergies indicates:   Allergen Reactions    Penicillins      Other reaction(s): Rash    Other Rash and Other (See Comments)     Other reaction(s): Swelling  Other reaction(s): Sneezing  Other reaction(s): Shortness of breath    Pt reports she is allergic to meat        No current facility-administered medications on file prior to encounter.      Current Outpatient Prescriptions on File Prior to Encounter   Medication Sig Dispense Refill    albuterol 90 mcg/actuation inhaler Inhale 2 puffs into the lungs every 6 (six) hours as needed for Wheezing. 18 g 12    azithromycin (ZITHROMAX Z-TALITA) 250 MG tablet Take 2 tablets (500 mg) on  Day 1,  followed by 1 tablet (250 mg) once daily on Days 2 through 5. 6 tablet 0    CETIRIZINE HCL (ZYRTEC ORAL) daily as needed.       cloNIDine (CATAPRES) 0.1 MG tablet TAKE 1 TABLET BY MOUTH EVERY DAY 90 tablet 3    docusate sodium (STOOL SOFTENER) 50 MG capsule Take by mouth 2 (two) times daily.      epinephrine (EPIPEN) 0.3 mg/0.3 mL (1:1,000) AtIn Inject 0.3 mLs (0.3 mg total) into the muscle once. 1 Pen Injector Intramuscular .  use as directed 0.3 mL 0    fluticasone-salmeterol 250-50 mcg/dose (ADVAIR DISKUS) 250-50 mcg/dose diskus inhaler Inhale 1 puff into the lungs 2 (two) times daily as needed. 1 Disk with Device Inhalation Twice a day 60 each 12    levothyroxine (SYNTHROID) 50 MCG tablet Take 1 tablet (50 mcg total) by mouth once daily. 90 tablet 3    meclizine (ANTIVERT) 12.5 mg  tablet Take 1 tablet (12.5 mg total) by mouth every 6 (six) hours. 1 Tablet Oral Three times a day 20 tablet 3    mometasone (ASMANEX TWISTHALER) 220 mcg (60 doses) AePB 2 puffs inhaled once daily. Disp one canister 1 g 1    sertraline (ZOLOFT) 50 MG tablet Take 1 tablet (50 mg total) by mouth once daily. 90 tablet 2    tolterodine (DETROL LA) 4 MG 24 hr capsule TAKE 1 CAPSULE BY MOUTH EVERY MORNING 90 capsule 3       Past Surgical History:   Procedure Laterality Date    anal fissure      BILATERAL SALPINGOOPHORECTOMY      BREAST BIOPSY  ~1995    right breast excisional biopsy- benign    BREAST LUMPECTOMY  1992    left lumpectomy with ALND for IDC    CERVICAL CONIZATION   W/ LASER      HYSTERECTOMY         Social History     Social History    Marital status:      Spouse name: Esvin    Number of children: 2    Years of education: N/A     Occupational History    [employer]     Social History Main Topics    Smoking status: Never Smoker    Smokeless tobacco: Never Used    Alcohol use No    Drug use: No    Sexual activity: Yes     Partners: Male     Other Topics Concern    Not on file     Social History Narrative    No narrative on file         Vital Signs Range (Last 24H):         CBC:   Lab Results   Component Value Date    WBC 4.49 09/19/2017    HGB 13.1 09/19/2017    HCT 38.6 09/19/2017    MCV 89 09/19/2017     (L) 09/19/2017       CMP:   CMP  Sodium   Date Value Ref Range Status   09/19/2017 143 136 - 145 mmol/L Final     Potassium   Date Value Ref Range Status   09/19/2017 4.6 3.5 - 5.1 mmol/L Final     Chloride   Date Value Ref Range Status   09/19/2017 108 95 - 110 mmol/L Final     CO2   Date Value Ref Range Status   09/19/2017 30 (H) 23 - 29 mmol/L Final     Glucose   Date Value Ref Range Status   09/19/2017 104 70 - 110 mg/dL Final     BUN, Bld   Date Value Ref Range Status   09/19/2017 12 6 - 20 mg/dL Final     Creatinine   Date Value Ref Range Status   09/19/2017 0.9  0.5 - 1.4 mg/dL Final     Calcium   Date Value Ref Range Status   09/19/2017 9.5 8.7 - 10.5 mg/dL Final     Total Protein   Date Value Ref Range Status   09/19/2017 7.8 6.0 - 8.4 g/dL Final     Albumin   Date Value Ref Range Status   09/19/2017 3.4 (L) 3.5 - 5.2 g/dL Final     Total Bilirubin   Date Value Ref Range Status   09/19/2017 0.3 0.1 - 1.0 mg/dL Final     Comment:     For infants and newborns, interpretation of results should be based  on gestational age, weight and in agreement with clinical  observations.  Premature Infant recommended reference ranges:  Up to 24 hours.............<8.0 mg/dL  Up to 48 hours............<12.0 mg/dL  3-5 days..................<15.0 mg/dL  6-29 days.................<15.0 mg/dL       Alkaline Phosphatase   Date Value Ref Range Status   09/19/2017 92 55 - 135 U/L Final     AST   Date Value Ref Range Status   09/19/2017 14 10 - 40 U/L Final     ALT   Date Value Ref Range Status   09/19/2017 14 10 - 44 U/L Final     Anion Gap   Date Value Ref Range Status   09/19/2017 5 (L) 8 - 16 mmol/L Final     eGFR if    Date Value Ref Range Status   09/19/2017 >60.0 >60 mL/min/1.73 m^2 Final     eGFR if non    Date Value Ref Range Status   09/19/2017 >60.0 >60 mL/min/1.73 m^2 Final     Comment:     Calculation used to obtain the estimated glomerular filtration  rate (eGFR) is the CKD-EPI equation. Since race is unknown   in our information system, the eGFR values for   -American and Non--American patients are given   for each creatinine result.         INR  No results for input(s): PT, INR, PROTIME, APTT in the last 72 hours.        Anesthesia Evaluation    I have reviewed the Patient Summary Reports.    I have reviewed the Nursing Notes.   I have reviewed the Medications.     Review of Systems  Anesthesia Hx:  No problems with previous Anesthesia  History of prior surgery of interest to airway management or planning:  Denies Personal Hx of  Anesthesia complications.   Cardiovascular:  Cardiovascular Normal     Pulmonary:   Asthma    Neurological:  Neurology Normal    Endocrine:   Hypothyroidism        Physical Exam  General:  Well nourished, Obesity    Airway/Jaw/Neck:  Airway Findings: Mouth Opening: Normal Tongue: Normal  General Airway Assessment: Adult  Mallampati: II  TM Distance: Normal, at least 6 cm  Jaw/Neck Findings:  Neck ROM: Normal ROM      Dental:  Dental Findings: In tact             Anesthesia Plan  Type of Anesthesia, risks & benefits discussed:  Anesthesia Type:  general  Patient's Preference:   Intra-op Monitoring Plan:   Intra-op Monitoring Plan Comments:   Post Op Pain Control Plan:   Post Op Pain Control Plan Comments:   Induction:   IV  Beta Blocker:  Patient is not currently on a Beta-Blocker (No further documentation required).       Informed Consent: Patient understands risks and agrees with Anesthesia plan.  Questions answered. Anesthesia consent signed with patient.  ASA Score: 2     Day of Surgery Review of History & Physical:            Ready For Surgery From Anesthesia Perspective.

## 2018-04-18 NOTE — BRIEF OP NOTE
Operative Note       Surgery Date: 4/18/2018     Surgeon(s) and Role:     * Blake Parsons MD - Primary    Pre-op Diagnosis:  Biliary colic [K80.50]    Post-op Diagnosis:  Biliary colic [K80.50]    Procedure(s) (LRB):  CHOLECYSTECTOMY-LAPAROSCOPIC (N/A)    Anesthesia: General    Procedure in Detail/Findings:  Gallbladder with moderate adhesions and stones    Estimated Blood Loss: Minimal           Specimens     Start     Ordered    04/18/18 0834  Specimen to Pathology - Surgery  Once      04/18/18 0834        Implants: * No implants in log *           Disposition: PACU - hemodynamically stable.           Condition: Good    Attestation:  I was present and scrubbed for the entire procedure.

## 2018-04-18 NOTE — DISCHARGE SUMMARY
[facility]  Discharge Summary  General Surgery      Admit Date: 4/18/2018    Discharge Date and Time: No discharge date for patient encounter.    Attending Physician: Blake Parsons MD     Discharge Provider: Blake Parsons    Reason for Admission: Biliary colic [K80.50]    Procedures Performed: Procedure(s) (LRB):  CHOLECYSTECTOMY-LAPAROSCOPIC (N/A)    Hospital Course (synopsis of major diagnoses, care, treatment, and services provided during the course of the hospital stay): She had an uneventful postop course.     Consults: none    Significant Diagnostic Studies: none    Final Diagnoses:   Principal Problem: Gallstones   Secondary Diagnoses:   Active Hospital Problems    Diagnosis  POA    *Gallstones [K80.20]  Yes      Resolved Hospital Problems    Diagnosis Date Resolved POA   No resolved problems to display.       Discharged Condition: good    Disposition: Home or Self Care    Follow Up/Patient Instructions:  Follow up 2 weeks.    Medications:  Reconciled Home Medications: Current Discharge Medication List      START taking these medications    Details   hydrocodone-acetaminophen 5-325mg (NORCO) 5-325 mg per tablet Take 1 tablet by mouth every 6 (six) hours as needed for Pain.  Qty: 15 tablet, Refills: 0      ondansetron (ZOFRAN-ODT) 4 MG TbDL Take 2 tablets (8 mg total) by mouth every 8 (eight) hours as needed.  Qty: 5 tablet, Refills: 0         CONTINUE these medications which have NOT CHANGED    Details   cloNIDine (CATAPRES) 0.1 MG tablet TAKE 1 TABLET BY MOUTH EVERY DAY  Qty: 90 tablet, Refills: 3    Associated Diagnoses: Essential hypertension      docusate sodium (STOOL SOFTENER) 50 MG capsule Take by mouth 2 (two) times daily.      levothyroxine (SYNTHROID) 50 MCG tablet Take 1 tablet (50 mcg total) by mouth once daily.  Qty: 90 tablet, Refills: 3    Associated Diagnoses: Acquired hypothyroidism      sertraline (ZOLOFT) 50 MG tablet Take 1 tablet (50 mg total) by mouth once  daily.  Qty: 90 tablet, Refills: 2    Associated Diagnoses: Other depression      tolterodine (DETROL LA) 4 MG 24 hr capsule TAKE 1 CAPSULE BY MOUTH EVERY MORNING  Qty: 90 capsule, Refills: 3    Associated Diagnoses: Bladder dysfunction      albuterol 90 mcg/actuation inhaler Inhale 2 puffs into the lungs every 6 (six) hours as needed for Wheezing.  Qty: 18 g, Refills: 12    Associated Diagnoses: Mild intermittent asthma without complication      azithromycin (ZITHROMAX Z-TALITA) 250 MG tablet Take 2 tablets (500 mg) on  Day 1,  followed by 1 tablet (250 mg) once daily on Days 2 through 5.  Qty: 6 tablet, Refills: 0    Associated Diagnoses: Viral URI with cough      CETIRIZINE HCL (ZYRTEC ORAL) daily as needed.       epinephrine (EPIPEN) 0.3 mg/0.3 mL (1:1,000) AtIn Inject 0.3 mLs (0.3 mg total) into the muscle once. 1 Pen Injector Intramuscular .  use as directed  Qty: 0.3 mL, Refills: 0      fluticasone-salmeterol 250-50 mcg/dose (ADVAIR DISKUS) 250-50 mcg/dose diskus inhaler Inhale 1 puff into the lungs 2 (two) times daily as needed. 1 Disk with Device Inhalation Twice a day  Qty: 60 each, Refills: 12    Associated Diagnoses: Mild intermittent asthma without complication      meclizine (ANTIVERT) 12.5 mg tablet Take 1 tablet (12.5 mg total) by mouth every 6 (six) hours. 1 Tablet Oral Three times a day  Qty: 20 tablet, Refills: 3    Associated Diagnoses: Vertigo      mometasone (ASMANEX TWISTHALER) 220 mcg (60 doses) AePB 2 puffs inhaled once daily. Disp one canister  Qty: 1 g, Refills: 1    Associated Diagnoses: Unspecified asthma(493.90)             Discharge Procedure Orders  Diet general     Weight bearing restrictions (specify)   Order Comments: No lifting more than 10 pounds for 2 weeks.     Call MD for:  temperature >100.4     Call MD for:  persistent nausea and vomiting     Call MD for:  severe uncontrolled pain     Call MD for:  difficulty breathing, headache or visual disturbances     Call MD for:  redness,  tenderness, or signs of infection (pain, swelling, redness, odor or green/yellow discharge around incision site)     Call MD for:  hives     Call MD for:  persistent dizziness or light-headedness     Call MD for:  extreme fatigue     Wound care routine (specify)   Order Comments: Wound care routine: No shower for 2 days.  Remove bandaids in 2 days and leave steri-strips on for 2 weeks.       Follow-up Information     Blake Parsons MD In 2 weeks.    Specialties:  General Surgery, Bariatrics  Contact information:  [address]  [facility] [address] [phone]

## 2018-04-18 NOTE — PLAN OF CARE
Discharge instructions given to patient and spouse. Educated patient on procedure and post op instructions, medications and when to follow up within designated time frame. Patient verbalized understanding. Patient denies pain and nausea at this time. PO fluids tolerated.

## 2018-04-18 NOTE — ANESTHESIA POSTPROCEDURE EVALUATION
"Anesthesia Post Evaluation    Patient: Mayra Crawford    Procedure(s) Performed: Procedure(s) (LRB):  CHOLECYSTECTOMY-LAPAROSCOPIC (N/A)    Final Anesthesia Type: general  Patient location during evaluation: PACU  Patient participation: Yes- Able to Participate  Level of consciousness: awake and alert and oriented  Post-procedure vital signs: reviewed and stable  Pain management: adequate  Airway patency: patent  PONV status at discharge: No PONV  Anesthetic complications: no      Cardiovascular status: hemodynamically stable  Respiratory status: unassisted and spontaneous ventilation  Hydration status: euvolemic  Follow-up not needed.        Visit Vitals  /66   Pulse 78   Temp 36.6 °C (97.8 °F) (Temporal)   Resp 12   Ht 5' 5" (1.651 m)   Wt 80.7 kg (178 lb)   SpO2 96%   Breastfeeding? No   BMI 29.62 kg/m²       Pain/Antonella Score: Pain Assessment Performed: Yes (4/18/2018 10:42 AM)  Presence of Pain: denies (4/18/2018 10:42 AM)  Pain Rating Prior to Med Admin: 0 (4/18/2018 10:42 AM)  Pain Rating Post Med Admin: 0 (4/18/2018 10:42 AM)  Antonella Score: 10 (4/18/2018 10:42 AM)      "

## 2018-04-18 NOTE — H&P
History of Present Illness:  Patient is a 57 y.o. female who presents to clinic today for evaluation of gallstones. She reports an approx 1 year history of intermittent post-prandial cramping/pressure mid-epigastric abdominal pain. She also has had chronic constipation that recently improved with addition of Synthroid in Jan 2018 (2 months ago). She reports that in that time, her upper abdominal pain episodes have improved as well. She has had two occurrences in the interim. Reports some associated bloating. Denies nausea, vomiting. No fever. She was seen by GI (Dr. Alcaraz), who obtained an abdominal ultrasound and performed an EGD. EGD showed only some mild erosive gastritis and was negative for H pylori. Ultrasound showed gallstones with largest measuring 2.5 cm. Denies shoulder pain. She is hesitant to have any surgical intervention at this time.    No significant changes since I last saw her.    Past Medical History:   Diagnosis Date    Asthma     Breast cancer 1992    left breast IDC    Chronic constipation     Genetic testing 12/2006    BRACAnalysis with rearrangement negative for mutation    Heart murmur     kidney stone        Past Surgical History:   Procedure Laterality Date    anal fissure      BILATERAL SALPINGOOPHORECTOMY      BREAST BIOPSY  ~1995    right breast excisional biopsy- benign    BREAST LUMPECTOMY  1992    left lumpectomy with ALND for IDC    CERVICAL CONIZATION   W/ LASER      HYSTERECTOMY         Family History   Problem Relation Age of Onset    Hypertension Mother     Nephrolithiasis Mother     Cancer Father      lung    Diabetes Father     Heart disease Father     Breast cancer Maternal Aunt      late 50s    Cancer Maternal Aunt      breast    Breast cancer Maternal Aunt     Breast cancer Paternal Aunt     Ovarian cancer Neg Hx     Heart attack Neg Hx     Anal fissures Neg Hx     Colon cancer Neg Hx     Esophageal cancer Neg Hx        Social History     Social  History    Marital status:      Spouse name: Esvin    Number of children: 2    Years of education: N/A     Occupational History    [employer]     Social History Main Topics    Smoking status: Never Smoker    Smokeless tobacco: Never Used    Alcohol use No    Drug use: No    Sexual activity: Yes     Partners: Male     Other Topics Concern    None     Social History Narrative    None       Current Facility-Administered Medications   Medication Dose Route Frequency Provider Last Rate Last Dose    0.9%  NaCl infusion   Intravenous Continuous Blake Parsons MD 70 mL/hr at 04/18/18 0705      clindamycin 900 MG/50 ML D5W 900 mg/50 mL IVPB 900 mg  900 mg Intravenous On Call Procedure Blake Parsons MD        fentaNYL injection 25 mcg  25 mcg Intravenous Q5 Min PRN Marianna Mcgregor MD        lidocaine (PF) 10 mg/ml (1%) injection 10 mg  1 mL Intradermal Once Marianna Mcgregor MD        promethazine (PHENERGAN) 6.25 mg in dextrose 5 % 50 mL IVPB  6.25 mg Intravenous Q10 Min PRN Marianna Mcgregor MD        scopolamine 1.3-1.5 mg (1 mg over 3 days) 1 patch  1 patch Transdermal Q3 Days Marianna Mcgregor MD   1 patch at 04/18/18 0711    sodium chloride 0.9% flush 3 mL  3 mL Intravenous PRN Marianna Mcgregor MD           Review of patient's allergies indicates:   Allergen Reactions    Penicillins      Other reaction(s): Rash    Other Rash and Other (See Comments)     Other reaction(s): Swelling  Other reaction(s): Sneezing  Other reaction(s): Shortness of breath    Pt reports she is allergic to meat       PE: chest clear heart rrr abdomen benign    Plan: lap alexey

## 2018-04-18 NOTE — OP NOTE
DATE OF PROCEDURE: 4/18/2018    DIAGNOSIS: Biliary colic [K80.50]    PROCEDURE: Procedure(s) (LRB):  CHOLECYSTECTOMY-LAPAROSCOPIC (N/A)    Surgeon(s) and Role:     * Blake Parsons MD - Primary    ANESTHESIA: General.   PROCEDURE IN DETAIL: The patient was placed under general anesthesia. The   abdomen was prepped and draped in the usual manner. Access to peritoneum was   gained through the umbilicus using the Optiview trocar under direct vision.   Pneumoperitoneum to 15 mmHg with CO2 gas was obtained. Three 5 mm trocars were   placed under the right costal margin under direct vision at midline,   midclavicular line, and the anterior axial line. The gallbladder was pulled up   over the liver, exposing the triangle of Calot. Peritoneum was stripped off the   base of the gallbladder, exposing the cystic duct and artery as they entered   the gallbladder.  The cystic duct and artery were clipped divided.   The gallbladder was removed from the gallbladder bed using the hook cautery,   placed in an EndoCatch bag and removed from the abdomen through the navel.  The base was cauterized. The abdomen was irrigated, all irrigation fluid removed and   inspected for hemostasis. The trocars were removed under direct vision. Prior   to removing the last trocar, pneumoperitoneum was allowed to escape. The fascia   at the naval was closed with 0 Vicryl. The skin incisions were closed with 4-0   plain catgut, and reinforced with Mastisol, Steri-Strips, and Band-Aids. The   patient tolerated the procedure well and was brought to Recovery Room in stable   condition. Sponge and needle counts were correct at the end of the case.    Blood loss was min, complications were none, consent was obtained and pathology was gallbladder

## 2018-04-18 NOTE — TRANSFER OF CARE
"Anesthesia Transfer of Care Note    Patient: Mayra Crawford    Procedure(s) Performed: Procedure(s) (LRB):  CHOLECYSTECTOMY-LAPAROSCOPIC (N/A)    Patient location: PACU    Anesthesia Type: general    Transport from OR: Transported from OR on 6-10 L/min O2 by face mask with adequate spontaneous ventilation    Post pain: adequate analgesia    Post assessment: no apparent anesthetic complications    Post vital signs: stable    Level of consciousness: awake    Nausea/Vomiting: no nausea/vomiting    Complications: none    Transfer of care protocol was followed      Last vitals:   Visit Vitals  /79   Pulse 62   Temp 36.3 °C (97.3 °F) (Temporal)   Resp 11   Ht 5' 5" (1.651 m)   Wt 80.7 kg (178 lb)   SpO2 100%   Breastfeeding? No   BMI 29.62 kg/m²     "

## 2018-05-08 ENCOUNTER — OFFICE VISIT (OUTPATIENT)
Dept: SURGERY | Facility: CLINIC | Age: 58
End: 2018-05-08
Payer: COMMERCIAL

## 2018-05-08 VITALS
WEIGHT: 179 LBS | HEART RATE: 68 BPM | HEIGHT: 65 IN | BODY MASS INDEX: 29.82 KG/M2 | DIASTOLIC BLOOD PRESSURE: 79 MMHG | SYSTOLIC BLOOD PRESSURE: 134 MMHG

## 2018-05-08 DIAGNOSIS — Z09 POSTOP CHECK: Primary | ICD-10-CM

## 2018-05-08 PROBLEM — K80.20 GALLSTONES: Status: RESOLVED | Noted: 2018-03-15 | Resolved: 2018-05-08

## 2018-05-08 PROBLEM — R10.10 UPPER ABDOMINAL PAIN: Status: RESOLVED | Noted: 2018-01-24 | Resolved: 2018-05-08

## 2018-05-08 PROCEDURE — 99999 PR PBB SHADOW E&M-EST. PATIENT-LVL III: CPT | Mod: PBBFAC,,, | Performed by: SURGERY

## 2018-05-08 PROCEDURE — 99024 POSTOP FOLLOW-UP VISIT: CPT | Mod: S$GLB,,, | Performed by: SURGERY

## 2018-05-08 NOTE — LETTER
May 8, 2018        Joe Cabral MD  [address] [facility] [address]             [facility]  [address] [facility]  [facility] [address]  Phone: [phone]   Patient: Mayra Crawford   MR Number: [id number]   YOB: 1960   Date of Visit: 5/8/2018       Dear Dr. Cabral:    Thank you for referring Mayra Crawford to me for evaluation. Attached you will find relevant portions of my assessment and plan of care.     Assessment & Plan Doing well after lap alexey.  Back to work full duty Thursday.    If you have questions, please do not hesitate to call me. I look forward to following Mayra Crawford along with you.    Sincerely,      Blake Parsons MD            CC  Delfin Lewis MD    Enclosure

## 2018-05-08 NOTE — PROGRESS NOTES
Mayra Crawford is a 57 y.o. female patient.   No diagnosis found.  Past Medical History:   Diagnosis Date    Asthma     Breast cancer 1992    left breast IDC    Chronic constipation     Genetic testing 12/2006    BRACAnalysis with rearrangement negative for mutation    Heart murmur     kidney stone      No past surgical history pertinent negatives on file.  Scheduled Meds:  Continuous Infusions:  PRN Meds:    Review of patient's allergies indicates:   Allergen Reactions    Penicillins      Other reaction(s): Rash    Other Rash and Other (See Comments)     Other reaction(s): Swelling  Other reaction(s): Sneezing  Other reaction(s): Shortness of breath    Pt reports she is allergic to meat     There are no hospital problems to display for this patient.    There were no vitals taken for this visit.    Subjective S/p lap alexey 4/18/18.  She has some constipation and has slight postop pain in the right flank.    Objective Abdomen benign, wounds clear, path reviewed.   Assessment & Plan Doing well after lap alexey.  Back to work full duty Thursday.       Blake Parsons MD  5/8/2018

## 2018-07-01 DIAGNOSIS — R42 VERTIGO: ICD-10-CM

## 2018-07-01 RX ORDER — MECLIZINE HCL 12.5 MG 12.5 MG/1
TABLET ORAL
Qty: 20 TABLET | Refills: 0 | Status: SHIPPED | OUTPATIENT
Start: 2018-07-01 | End: 2019-10-09 | Stop reason: SDUPTHER

## 2018-08-15 DIAGNOSIS — Z85.3 HISTORY OF BREAST CANCER: Primary | ICD-10-CM

## 2018-09-26 ENCOUNTER — HOSPITAL ENCOUNTER (OUTPATIENT)
Dept: RADIOLOGY | Facility: HOSPITAL | Age: 58
Discharge: HOME | End: 2018-09-26
Attending: SURGERY
Payer: COMMERCIAL

## 2018-09-26 ENCOUNTER — OFFICE VISIT (OUTPATIENT)
Dept: HEMATOLOGY/ONCOLOGY | Facility: CLINIC | Age: 58
End: 2018-09-26
Payer: COMMERCIAL

## 2018-09-26 ENCOUNTER — TELEPHONE (OUTPATIENT)
Dept: OBSTETRICS AND GYNECOLOGY | Facility: CLINIC | Age: 58
End: 2018-09-26

## 2018-09-26 ENCOUNTER — TELEPHONE (OUTPATIENT)
Dept: HEMATOLOGY/ONCOLOGY | Facility: CLINIC | Age: 58
End: 2018-09-26

## 2018-09-26 ENCOUNTER — TELEPHONE (OUTPATIENT)
Dept: INTERNAL MEDICINE | Facility: CLINIC | Age: 58
End: 2018-09-26

## 2018-09-26 VITALS
RESPIRATION RATE: 16 BRPM | BODY MASS INDEX: 30.27 KG/M2 | TEMPERATURE: 98 F | OXYGEN SATURATION: 99 % | HEIGHT: 65 IN | WEIGHT: 181.69 LBS | DIASTOLIC BLOOD PRESSURE: 74 MMHG | SYSTOLIC BLOOD PRESSURE: 139 MMHG | HEART RATE: 55 BPM

## 2018-09-26 DIAGNOSIS — Z85.3 HISTORY OF BREAST CANCER: ICD-10-CM

## 2018-09-26 DIAGNOSIS — Z85.3 HISTORY OF BREAST CANCER: Primary | ICD-10-CM

## 2018-09-26 DIAGNOSIS — E03.9 ACQUIRED HYPOTHYROIDISM: ICD-10-CM

## 2018-09-26 PROCEDURE — 3075F SYST BP GE 130 - 139MM HG: CPT | Mod: CPTII,S$GLB,, | Performed by: PHYSICIAN ASSISTANT

## 2018-09-26 PROCEDURE — 99213 OFFICE O/P EST LOW 20 MIN: CPT | Mod: S$GLB,,, | Performed by: PHYSICIAN ASSISTANT

## 2018-09-26 PROCEDURE — 77062 BREAST TOMOSYNTHESIS BI: CPT | Mod: 26,,, | Performed by: RADIOLOGY

## 2018-09-26 PROCEDURE — 77066 DX MAMMO INCL CAD BI: CPT | Mod: TC,PO

## 2018-09-26 PROCEDURE — 77066 DX MAMMO INCL CAD BI: CPT | Mod: 26,,, | Performed by: RADIOLOGY

## 2018-09-26 PROCEDURE — 99999 PR PBB SHADOW E&M-EST. PATIENT-LVL V: CPT | Mod: PBBFAC,,, | Performed by: PHYSICIAN ASSISTANT

## 2018-09-26 PROCEDURE — 3078F DIAST BP <80 MM HG: CPT | Mod: CPTII,S$GLB,, | Performed by: PHYSICIAN ASSISTANT

## 2018-09-26 PROCEDURE — 3008F BODY MASS INDEX DOCD: CPT | Mod: CPTII,S$GLB,, | Performed by: PHYSICIAN ASSISTANT

## 2018-09-26 NOTE — TELEPHONE ENCOUNTER
Attempted to contact pt to inform that all of her lab work looked okay, pt with no answer. Detailed voicemail left. Informed that PCP that was referred by Margie should review them with pt annually. Office number provided to have patient contact back with any further questions or concerns.

## 2018-09-26 NOTE — TELEPHONE ENCOUNTER
----- Message from Lana Woodward sent at 9/26/2018 11:50 AM CDT -----  Good morning, Margie Mera saw this patient in clinic and is asking she f/u in gynecology for well woman visit. I see that she saw  back in 2013. Can you assist with getting her back in.

## 2018-09-26 NOTE — PROGRESS NOTES
Subjective:       Patient ID: Mayra Crawford is a 58 y.o. female.    Chief Complaint: History of breast cancer    Ms. Crawford returned to clinic for followup of remote history of carcinoma of  the left breast.    Ms. Crawford is feeling well today and without complaints. No fever, chills, nausea, vomiting or shortness of breath. She remains on synthroid.  Appetite is good. She is not exercising regularly.          Breast history diagnosed in 1992 treated with lumpectomy and adjuvant       chemotherapy with 5-FU and methotrexate on protocol NSABP B-20.  She then    took 5 years of tamoxifen and completed in 1997.                Review of Systems   Constitutional: Negative.    HENT: Negative for congestion, rhinorrhea, sore throat and trouble swallowing.    Eyes: Negative for visual disturbance.   Respiratory: Negative for cough, chest tightness and shortness of breath.    Cardiovascular: Negative for chest pain, palpitations and leg swelling.   Gastrointestinal: Negative for abdominal pain, blood in stool, constipation, diarrhea, nausea and vomiting.   Genitourinary: Negative for dysuria, hematuria and vaginal bleeding.   Musculoskeletal: Negative for arthralgias, back pain and myalgias.   Skin: Negative for pallor and rash.   Neurological: Negative for dizziness, weakness and headaches.   Hematological: Negative for adenopathy. Does not bruise/bleed easily.   Psychiatric/Behavioral: Negative for dysphoric mood and suicidal ideas. The patient is not nervous/anxious.        Objective:      Physical Exam   Constitutional: She is oriented to person, place, and time. She appears well-developed and well-nourished. No distress.   Presents with    HENT:   Head: Normocephalic.   Mouth/Throat: Oropharynx is clear and moist. No oropharyngeal exudate.   Eyes: Conjunctivae are normal. Pupils are equal, round, and reactive to light. No scleral icterus.   Neck: Normal range of motion. Neck supple. No thyromegaly present.    Cardiovascular: Normal rate and regular rhythm.   Pulmonary/Chest: Effort normal and breath sounds normal. No respiratory distress.   Right breast without mass, nodule or skin changes. Left breast with well healed incision, no mass, nodule or skin changes. No axillary or supraclavicular adenopathy.    Abdominal: Soft. Bowel sounds are normal. She exhibits no distension and no mass. There is no tenderness.   Musculoskeletal: Normal range of motion. She exhibits no edema or tenderness.   No spinal or paraspinal tenderness to palpation     Lymphadenopathy:     She has no cervical adenopathy.   Neurological: She is alert and oriented to person, place, and time. No cranial nerve deficit.   Skin: Skin is warm and dry.   Psychiatric: She has a normal mood and affect. Her behavior is normal. Thought content normal.   Vitals reviewed.      Assessment:       1. History of breast cancer    2. Acquired hypothyroidism        Plan:       1)Clinically and radiologically without evidence of disease.  RTC in one year with mammogram.  2)continue synthroid; refer to PCP    Patient also requests referral to GYN for well woman exam.    Distress Screening Results: Psychosocial Distress screening score of Distress Score: 0 noted and reviewed. No intervention indicated.

## 2018-09-26 NOTE — TELEPHONE ENCOUNTER
----- Message from Lana Woodward sent at 9/26/2018 11:47 AM CDT -----  Good morning, Margie Mera saw this patient in clinic today and is asking the patient f/u with Dr. Humphreys. Can you please assist with scheduling an appointment.

## 2018-09-26 NOTE — Clinical Note
1 year with ana and mammogram; cbc/cmp/tsh/t4/lipid panel at that timeReferral to primary care (dr. Humphreys if possible)Referral to gynecology for well woman visit

## 2018-09-26 NOTE — TELEPHONE ENCOUNTER
I called patient and schedule patient for a WWE with Dr. Zavala on 10/24/2018 at 10:30am. Patient verbalized understanding.

## 2018-09-28 DIAGNOSIS — F32.89 OTHER DEPRESSION: ICD-10-CM

## 2018-09-28 RX ORDER — SERTRALINE HYDROCHLORIDE 50 MG/1
TABLET, FILM COATED ORAL
Qty: 90 TABLET | Refills: 3 | Status: SHIPPED | OUTPATIENT
Start: 2018-09-28 | End: 2019-09-27 | Stop reason: SDUPTHER

## 2018-10-12 DIAGNOSIS — N31.9 BLADDER DYSFUNCTION: ICD-10-CM

## 2018-10-12 DIAGNOSIS — I10 ESSENTIAL HYPERTENSION: ICD-10-CM

## 2018-10-12 RX ORDER — TOLTERODINE 4 MG/1
CAPSULE, EXTENDED RELEASE ORAL
Qty: 90 CAPSULE | Refills: 3 | Status: SHIPPED | OUTPATIENT
Start: 2018-10-12 | End: 2019-09-27 | Stop reason: SDUPTHER

## 2018-10-12 RX ORDER — CLONIDINE HYDROCHLORIDE 0.1 MG/1
TABLET ORAL
Qty: 90 TABLET | Refills: 3 | Status: SHIPPED | OUTPATIENT
Start: 2018-10-12 | End: 2019-09-27 | Stop reason: SDUPTHER

## 2018-10-15 ENCOUNTER — OFFICE VISIT (OUTPATIENT)
Dept: URGENT CARE | Facility: CLINIC | Age: 58
End: 2018-10-15
Payer: COMMERCIAL

## 2018-10-15 VITALS
TEMPERATURE: 98 F | HEART RATE: 77 BPM | WEIGHT: 181 LBS | BODY MASS INDEX: 30.12 KG/M2 | SYSTOLIC BLOOD PRESSURE: 157 MMHG | DIASTOLIC BLOOD PRESSURE: 92 MMHG | OXYGEN SATURATION: 99 %

## 2018-10-15 DIAGNOSIS — M54.50 ACUTE MIDLINE LOW BACK PAIN WITHOUT SCIATICA: Primary | ICD-10-CM

## 2018-10-15 PROCEDURE — 3008F BODY MASS INDEX DOCD: CPT | Mod: CPTII,S$GLB,, | Performed by: NURSE PRACTITIONER

## 2018-10-15 PROCEDURE — 99214 OFFICE O/P EST MOD 30 MIN: CPT | Mod: 25,S$GLB,, | Performed by: NURSE PRACTITIONER

## 2018-10-15 PROCEDURE — 3077F SYST BP >= 140 MM HG: CPT | Mod: CPTII,S$GLB,, | Performed by: NURSE PRACTITIONER

## 2018-10-15 PROCEDURE — 3080F DIAST BP >= 90 MM HG: CPT | Mod: CPTII,S$GLB,, | Performed by: NURSE PRACTITIONER

## 2018-10-15 PROCEDURE — 96372 THER/PROPH/DIAG INJ SC/IM: CPT | Mod: S$GLB,,, | Performed by: NURSE PRACTITIONER

## 2018-10-15 RX ORDER — CYCLOBENZAPRINE HCL 5 MG
5 TABLET ORAL 3 TIMES DAILY PRN
Qty: 20 TABLET | Refills: 0 | Status: SHIPPED | OUTPATIENT
Start: 2018-10-15 | End: 2018-10-25

## 2018-10-15 RX ORDER — ETODOLAC 400 MG/1
400 TABLET, FILM COATED ORAL 2 TIMES DAILY
Qty: 20 TABLET | Refills: 0 | Status: SHIPPED | OUTPATIENT
Start: 2018-10-15 | End: 2018-12-04 | Stop reason: ALTCHOICE

## 2018-10-15 RX ORDER — KETOROLAC TROMETHAMINE 30 MG/ML
30 INJECTION, SOLUTION INTRAMUSCULAR; INTRAVENOUS
Status: COMPLETED | OUTPATIENT
Start: 2018-10-15 | End: 2018-10-15

## 2018-10-15 RX ADMIN — KETOROLAC TROMETHAMINE 30 MG: 30 INJECTION, SOLUTION INTRAMUSCULAR; INTRAVENOUS at 06:10

## 2018-10-15 NOTE — PATIENT INSTRUCTIONS
Back Pain (Acute or Chronic)    Back pain is one of the most common problems. The good news is that most people feel better in 1 to 2 weeks, and most of the rest in 1 to 2 months. Most people can remain active.  People experience and describe pain differently; not everyone is the same.  · The pain can be sharp, stabbing, shooting, aching, cramping or burning.  · Movement, standing, bending, lifting, sitting, or walking may worsen pain.  · It can be localized to one spot or area, or it can be more generalized.  · It can spread or radiate upwards, to the front, or go down your arms or legs (sciatica).  · It can cause muscle spasm.  Most of the time, mechanical problems with the muscles or spine cause the pain. Mechanical problems are usually caused by an injury to the muscles or ligaments. While illness can cause back pain, it is usually not caused by a serious illness. Mechanical problems include:   · Physical activity such as sports, exercise, work, or normal activity  · Overexertion, lifting, pushing, pulling incorrectly or too aggressively  · Sudden twisting, bending, or stretching from an accident, or accidental movement  · Poor posture  · Stretching or moving wrong, without noticing pain at the time  · Poor coordination, lack of regular exercise (check with your doctor about this)  · Spinal disc disease or arthritis  · Stress  Pain can also be related to pregnancy, or illness like appendicitis, bladder or kidney infections, pelvic infections, and many other things.  Acute back pain usually gets better in 1 to 2 weeks. Back pain related to disk disease, arthritis in the spinal joints or spinal stenosis (narrowing of the spinal canal) can become chronic and last for months or years.  Unless you had a physical injury (for example, a car accident or fall) X-rays are usually not needed for the initial evaluation of back pain. If pain continues and does not respond to medical treatment, X-rays and other tests may be  needed.  Home care  Try these home care recommendations:  · When in bed, try to find a position of comfort. A firm mattress is best. Try lying flat on your back with pillows under your knees. You can also try lying on your side with your knees bent up towards your chest and a pillow between your knees.  · At first, do not try to stretch out the sore spots. If there is a strain, it is not like the good soreness you get after exercising without an injury. In this case, stretching may make it worse.  · Avoid prolong sitting, long car rides, or travel. This puts more stress on the lower back than standing or walking.  · During the first 24 to 72 hours after an acute injury or flare up of chronic back pain, apply an ice pack to the painful area for 20 minutes and then remove it for 20 minutes. Do this over a period of 60 to 90 minutes or several times a day. This will reduce swelling and pain. Wrap the ice pack in a thin towel or plastic to protect your skin.  · You can start with ice, then switch to heat. Heat (hot shower, hot bath, or heating pad) reduces pain and works well for muscle spasms. Heat can be applied to the painful area for 20 minutes then remove it for 20 minutes. Do this over a period of 60 to 90 minutes or several times a day. Do not sleep on a heating pad. It can lead to skin burns or tissue damage.  · You can alternate ice and heat therapy. Talk with your doctor about the best treatment for your back pain.  · Therapeutic massage can help relax the back muscles without stretching them.  · Be aware of safe lifting methods and do not lift anything without stretching first.  Medicines  Talk to your doctor before using medicine, especially if you have other medical problems or are taking other medicines.  · You may use over-the-counter medicine as directed on the bottle to control pain, unless another pain medicine was prescribed. If you have chronic conditions like diabetes, liver or kidney disease,  stomach ulcers, or gastrointestinal bleeding, or are taking blood thinners, talk to your doctor before taking any medicine.  · Be careful if you are given a prescription medicines, narcotics, or medicine for muscle spasms. They can cause drowsiness, affect your coordination, reflexes, and judgement. Do not drive or operate heavy machinery.  Follow-up care  Follow up with your healthcare provider, or as advised.   A radiologist will review any X-rays that were taken. Your provide will notify you of any new findings that may affect your care.  Call 911  Call emergency services if any of the following occur:  · Trouble breathing  · Confusion  · Very drowsy or trouble awakening  · Fainting or loss of consciousness  · Rapid or very slow heart rate  · Loss of bowel or bladder control  When to seek medical advice  Call your healthcare provider right away if any of these occur:   · Pain becomes worse or spreads to your legs  · Weakness or numbness in one or both legs  · Numbness in the groin or genital area  Date Last Reviewed: 7/1/2016 © 2000-2017 [employer]. [address] [place] [address]. All rights reserved. This information is not intended as a substitute for professional medical care. Always follow your healthcare professional's instructions.        Self-Care for Low Back Pain    Most people have low back pain now and then. In many cases, it isnt serious and self-care can help. Sometimes low back pain can be a sign of a bigger problem. Call your healthcare provider if your pain returns often or gets worse over time. For the long-term care of your back, get regular exercise, lose any excess weight and learn good posture.  Take a short rest  Lying down during the day may be beneficial for short periods of time if severe pain increases with sitting or standing. Long-term bed rest could be detrimental.  Reduce pain and swelling  Cold reduces swelling. Both cold and heat can reduce pain. Protect  your skin by placing a towel between your body and the ice or heat source.  · For the first few days, apply an ice pack for 15 to 20 minutes .  · After the first few days, try heat for 15 minutes at a time to ease pain. Never sleep on a heating pad.  · Over-the-counter medicine can help control pain and swelling. Try aspirin or ibuprofen.  Exercise  Exercise can help your back heal. It also helps your back get stronger and more flexible, preventing any reinjury. Ask your healthcare provider about specific exercises for your back.  Use good posture to avoid reinjury  · When moving, bend at the hips and knees. Dont bend at the waist or twist around.  · When lifting, keep the object close to your body. Dont try to lift more than you can handle.  · When sitting, keep your lower back supported. Use a rolled-up towel as needed.  Seek immediate medical care if:  · Youre unable to stand or walk.  · You have a temperature over 100.4°F (38.0°C)  · You have frequent, painful, or bloody urination.  · You have severe abdominal pain.  · You have a sharp, stabbing pain.  · Your pain is constant.  · You have pain or numbness in your leg.  · You feel pain in a new area of your back.  · You notice that the pain isnt decreasing after more than a week.   Date Last Reviewed: 9/29/2015 © 2000-2017 [employer]. [address] [place] [address], [place] [address]. All rights reserved. This information is not intended as a substitute for professional medical care. Always follow your healthcare professional's instructions.    Please drink plenty of fluids.  Please get plenty of rest.  Please return here or go to the Emergency Department for any concerns or worsening of condition.  If you were prescribed a narcotic medication, do not drive or operate heavy equipment or machinery while taking these medications.  If you were not prescribed an anti-inflammatory medication, and if you do not have any history of stomach/intestinal ulcers,  or kidney disease, or are not taking a blood thinner such as Coumadin, Plavix, Pradaxa, Eloquis, or Xaralta for example, it is OK to take over the counter Ibuprofen or Advil or Motrin or Aleve as directed.  Do not take these medications on an empty stomach.  If you lose control of your bowel and/or bladder, please go to the nearest Emergency Department immediately.  If you lose sensation in between your legs by your genitalia and/or rectum, please go to the nearest Emergency Department immediately.  If you lose control or sensation of any extremity, please go to the nearest Emergency Department immediately.  Please follow up with your primary care doctor or specialist as needed.    If you  smoke, please stop smoking.

## 2018-10-15 NOTE — PROGRESS NOTES
Subjective:       Patient ID: Mayra Crawford is a 58 y.o. female.    Vitals:  weight is 82.1 kg (181 lb). Her temperature is 97.5 °F (36.4 °C). Her blood pressure is 157/92 (abnormal) and her pulse is 77. Her oxygen saturation is 99%.     Chief Complaint: Back Pain    Pt states that she is currently having lower back pain , Pt states that she does not recall hurting herself but she did go bowling before it started hurting      Back Pain   This is a new problem. The current episode started in the past 7 days. The problem occurs constantly. The problem has been gradually improving since onset. The pain is present in the lumbar spine. The quality of the pain is described as aching. The pain radiates to the right thigh. The pain is at a severity of 5/10. The symptoms are aggravated by lying down and position. She has tried NSAIDs for the symptoms.     Review of Systems   Musculoskeletal: Positive for back pain.       Objective:      Physical Exam   Constitutional: She is oriented to person, place, and time. Vital signs are normal. She appears well-developed and well-nourished. She is active and cooperative. No distress.   HENT:   Head: Normocephalic and atraumatic.   Nose: Nose normal.   Mouth/Throat: Oropharynx is clear and moist and mucous membranes are normal.   Eyes: Conjunctivae and lids are normal.   Neck: Trachea normal, normal range of motion, full passive range of motion without pain and phonation normal. Neck supple.   Cardiovascular: Normal rate, regular rhythm, normal heart sounds, intact distal pulses and normal pulses.   Pulmonary/Chest: Effort normal and breath sounds normal.   Abdominal: Soft. Normal appearance and bowel sounds are normal. She exhibits no abdominal bruit, no pulsatile midline mass and no mass.   Musculoskeletal: She exhibits no edema or deformity.        Lumbar back: She exhibits pain and spasm.   Neurological: She is alert and oriented to person, place, and time. She has normal  strength and normal reflexes. No sensory deficit.   Skin: Skin is warm, dry and intact. She is not diaphoretic.   Psychiatric: She has a normal mood and affect. Her speech is normal and behavior is normal. Judgment and thought content normal. Cognition and memory are normal.   Nursing note and vitals reviewed.      Assessment:       1. Acute midline low back pain without sciatica        Plan:         Acute midline low back pain without sciatica    Other orders  -     cyclobenzaprine (FLEXERIL) 5 MG tablet; Take 1 tablet (5 mg total) by mouth 3 (three) times daily as needed for Muscle spasms.  Dispense: 20 tablet; Refill: 0  -     etodolac (LODINE) 400 MG tablet; Take 1 tablet (400 mg total) by mouth 2 (two) times daily.  Dispense: 20 tablet; Refill: 0  -     ketorolac injection 30 mg; Inject 1 mL (30 mg total) into the muscle one time.        Back Pain (Acute or Chronic)    Back pain is one of the most common problems. The good news is that most people feel better in 1 to 2 weeks, and most of the rest in 1 to 2 months. Most people can remain active.  People experience and describe pain differently; not everyone is the same.  · The pain can be sharp, stabbing, shooting, aching, cramping or burning.  · Movement, standing, bending, lifting, sitting, or walking may worsen pain.  · It can be localized to one spot or area, or it can be more generalized.  · It can spread or radiate upwards, to the front, or go down your arms or legs (sciatica).  · It can cause muscle spasm.  Most of the time, mechanical problems with the muscles or spine cause the pain. Mechanical problems are usually caused by an injury to the muscles or ligaments. While illness can cause back pain, it is usually not caused by a serious illness. Mechanical problems include:   · Physical activity such as sports, exercise, work, or normal activity  · Overexertion, lifting, pushing, pulling incorrectly or too aggressively  · Sudden twisting, bending, or  stretching from an accident, or accidental movement  · Poor posture  · Stretching or moving wrong, without noticing pain at the time  · Poor coordination, lack of regular exercise (check with your doctor about this)  · Spinal disc disease or arthritis  · Stress  Pain can also be related to pregnancy, or illness like appendicitis, bladder or kidney infections, pelvic infections, and many other things.  Acute back pain usually gets better in 1 to 2 weeks. Back pain related to disk disease, arthritis in the spinal joints or spinal stenosis (narrowing of the spinal canal) can become chronic and last for months or years.  Unless you had a physical injury (for example, a car accident or fall) X-rays are usually not needed for the initial evaluation of back pain. If pain continues and does not respond to medical treatment, X-rays and other tests may be needed.  Home care  Try these home care recommendations:  · When in bed, try to find a position of comfort. A firm mattress is best. Try lying flat on your back with pillows under your knees. You can also try lying on your side with your knees bent up towards your chest and a pillow between your knees.  · At first, do not try to stretch out the sore spots. If there is a strain, it is not like the good soreness you get after exercising without an injury. In this case, stretching may make it worse.  · Avoid prolong sitting, long car rides, or travel. This puts more stress on the lower back than standing or walking.  · During the first 24 to 72 hours after an acute injury or flare up of chronic back pain, apply an ice pack to the painful area for 20 minutes and then remove it for 20 minutes. Do this over a period of 60 to 90 minutes or several times a day. This will reduce swelling and pain. Wrap the ice pack in a thin towel or plastic to protect your skin.  · You can start with ice, then switch to heat. Heat (hot shower, hot bath, or heating pad) reduces pain and works well  for muscle spasms. Heat can be applied to the painful area for 20 minutes then remove it for 20 minutes. Do this over a period of 60 to 90 minutes or several times a day. Do not sleep on a heating pad. It can lead to skin burns or tissue damage.  · You can alternate ice and heat therapy. Talk with your doctor about the best treatment for your back pain.  · Therapeutic massage can help relax the back muscles without stretching them.  · Be aware of safe lifting methods and do not lift anything without stretching first.  Medicines  Talk to your doctor before using medicine, especially if you have other medical problems or are taking other medicines.  · You may use over-the-counter medicine as directed on the bottle to control pain, unless another pain medicine was prescribed. If you have chronic conditions like diabetes, liver or kidney disease, stomach ulcers, or gastrointestinal bleeding, or are taking blood thinners, talk to your doctor before taking any medicine.  · Be careful if you are given a prescription medicines, narcotics, or medicine for muscle spasms. They can cause drowsiness, affect your coordination, reflexes, and judgement. Do not drive or operate heavy machinery.  Follow-up care  Follow up with your healthcare provider, or as advised.   A radiologist will review any X-rays that were taken. Your provide will notify you of any new findings that may affect your care.  Call 911  Call emergency services if any of the following occur:  · Trouble breathing  · Confusion  · Very drowsy or trouble awakening  · Fainting or loss of consciousness  · Rapid or very slow heart rate  · Loss of bowel or bladder control  When to seek medical advice  Call your healthcare provider right away if any of these occur:   · Pain becomes worse or spreads to your legs  · Weakness or numbness in one or both legs  · Numbness in the groin or genital area  Date Last Reviewed: 7/1/2016  © 6995-8510 [employer]. [address]  [place] [address]. All rights reserved. This information is not intended as a substitute for professional medical care. Always follow your healthcare professional's instructions.        Self-Care for Low Back Pain    Most people have low back pain now and then. In many cases, it isnt serious and self-care can help. Sometimes low back pain can be a sign of a bigger problem. Call your healthcare provider if your pain returns often or gets worse over time. For the long-term care of your back, get regular exercise, lose any excess weight and learn good posture.  Take a short rest  Lying down during the day may be beneficial for short periods of time if severe pain increases with sitting or standing. Long-term bed rest could be detrimental.  Reduce pain and swelling  Cold reduces swelling. Both cold and heat can reduce pain. Protect your skin by placing a towel between your body and the ice or heat source.  · For the first few days, apply an ice pack for 15 to 20 minutes .  · After the first few days, try heat for 15 minutes at a time to ease pain. Never sleep on a heating pad.  · Over-the-counter medicine can help control pain and swelling. Try aspirin or ibuprofen.  Exercise  Exercise can help your back heal. It also helps your back get stronger and more flexible, preventing any reinjury. Ask your healthcare provider about specific exercises for your back.  Use good posture to avoid reinjury  · When moving, bend at the hips and knees. Dont bend at the waist or twist around.  · When lifting, keep the object close to your body. Dont try to lift more than you can handle.  · When sitting, keep your lower back supported. Use a rolled-up towel as needed.  Seek immediate medical care if:  · Youre unable to stand or walk.  · You have a temperature over 100.4°F (38.0°C)  · You have frequent, painful, or bloody urination.  · You have severe abdominal pain.  · You have a sharp, stabbing pain.  · Your pain  is constant.  · You have pain or numbness in your leg.  · You feel pain in a new area of your back.  · You notice that the pain isnt decreasing after more than a week.   Date Last Reviewed: 9/29/2015 © 2000-2017 [employer]. [address] [place] [address]. All rights reserved. This information is not intended as a substitute for professional medical care. Always follow your healthcare professional's instructions.    Please drink plenty of fluids.  Please get plenty of rest.  Please return here or go to the Emergency Department for any concerns or worsening of condition.  If you were prescribed a narcotic medication, do not drive or operate heavy equipment or machinery while taking these medications.  If you were not prescribed an anti-inflammatory medication, and if you do not have any history of stomach/intestinal ulcers, or kidney disease, or are not taking a blood thinner such as Coumadin, Plavix, Pradaxa, Eloquis, or Xaralta for example, it is OK to take over the counter Ibuprofen or Advil or Motrin or Aleve as directed.  Do not take these medications on an empty stomach.  If you lose control of your bowel and/or bladder, please go to the nearest Emergency Department immediately.  If you lose sensation in between your legs by your genitalia and/or rectum, please go to the nearest Emergency Department immediately.  If you lose control or sensation of any extremity, please go to the nearest Emergency Department immediately.  Please follow up with your primary care doctor or specialist as needed.    If you  smoke, please stop smoking.

## 2018-11-28 DIAGNOSIS — E03.9 ACQUIRED HYPOTHYROIDISM: ICD-10-CM

## 2018-11-28 RX ORDER — LEVOTHYROXINE SODIUM 50 UG/1
50 TABLET ORAL DAILY
Qty: 90 TABLET | Refills: 3 | Status: SHIPPED | OUTPATIENT
Start: 2018-11-28 | End: 2020-01-29

## 2018-12-04 ENCOUNTER — OFFICE VISIT (OUTPATIENT)
Dept: INTERNAL MEDICINE | Facility: CLINIC | Age: 58
End: 2018-12-04
Payer: COMMERCIAL

## 2018-12-04 ENCOUNTER — IMMUNIZATION (OUTPATIENT)
Dept: INTERNAL MEDICINE | Facility: CLINIC | Age: 58
End: 2018-12-04
Payer: COMMERCIAL

## 2018-12-04 ENCOUNTER — TELEPHONE (OUTPATIENT)
Dept: UROGYNECOLOGY | Facility: CLINIC | Age: 58
End: 2018-12-04

## 2018-12-04 VITALS
BODY MASS INDEX: 30.42 KG/M2 | DIASTOLIC BLOOD PRESSURE: 84 MMHG | HEIGHT: 65 IN | OXYGEN SATURATION: 99 % | WEIGHT: 182.56 LBS | SYSTOLIC BLOOD PRESSURE: 120 MMHG | HEART RATE: 59 BPM

## 2018-12-04 DIAGNOSIS — Z23 NEED FOR INFLUENZA VACCINATION: ICD-10-CM

## 2018-12-04 DIAGNOSIS — N39.41 URGE INCONTINENCE: Primary | ICD-10-CM

## 2018-12-04 DIAGNOSIS — Z85.3 HISTORY OF BREAST CANCER: ICD-10-CM

## 2018-12-04 DIAGNOSIS — J45.20 MILD INTERMITTENT ASTHMA WITHOUT COMPLICATION: ICD-10-CM

## 2018-12-04 DIAGNOSIS — R79.89 ELEVATED TSH: ICD-10-CM

## 2018-12-04 PROCEDURE — 3079F DIAST BP 80-89 MM HG: CPT | Mod: CPTII,S$GLB,, | Performed by: INTERNAL MEDICINE

## 2018-12-04 PROCEDURE — 90686 IIV4 VACC NO PRSV 0.5 ML IM: CPT | Mod: S$GLB,,, | Performed by: INTERNAL MEDICINE

## 2018-12-04 PROCEDURE — 99999 PR PBB SHADOW E&M-EST. PATIENT-LVL IV: CPT | Mod: PBBFAC,,, | Performed by: INTERNAL MEDICINE

## 2018-12-04 PROCEDURE — 90471 IMMUNIZATION ADMIN: CPT | Mod: S$GLB,,, | Performed by: INTERNAL MEDICINE

## 2018-12-04 PROCEDURE — 99215 OFFICE O/P EST HI 40 MIN: CPT | Mod: S$GLB,,, | Performed by: INTERNAL MEDICINE

## 2018-12-04 PROCEDURE — 3008F BODY MASS INDEX DOCD: CPT | Mod: CPTII,S$GLB,, | Performed by: INTERNAL MEDICINE

## 2018-12-04 PROCEDURE — 3074F SYST BP LT 130 MM HG: CPT | Mod: CPTII,S$GLB,, | Performed by: INTERNAL MEDICINE

## 2018-12-04 NOTE — PROGRESS NOTES
Subjective:       Patient ID: Mayra Crawford is a 58 y.o. female.    Chief Complaint: Establish Care    HPI    First visit with me.  Over the last year has been seen by Oncology, GI, General surgery.  Patient had endoscopy in January, along with cholecystectomy in April.    Urgency and incontinence still an issue despite Detrol.  Thinks that Detrol is helping with the symptoms some.  Has never been trained on bladder exercises or Kegel exercises.  Never seen Urogynecology.  Drinks 3 diet Cokes per day.    Patient was started on levothyroxine for irregular hormone levels by her oncologist.  Patient denies being symptomatic from hypothyroidism, also has not noted any symptomatic change since starting levothyroxine.    Reviewed PMH, PSH, SH, FH, allergies, and medications.     Review of Systems   All other systems reviewed and are negative.      Objective:      Physical Exam   Constitutional: She is oriented to person, place, and time. No distress.   HENT:   Head: Atraumatic.   Right Ear: Tympanic membrane normal. No tenderness.   Left Ear: Tympanic membrane normal. No tenderness.   Mouth/Throat: Oropharynx is clear and moist. No oropharyngeal exudate.   Eyes: Pupils are equal, round, and reactive to light. Right eye exhibits no discharge. Left eye exhibits no discharge.   Neck: Normal range of motion. No thyromegaly present.   Cardiovascular: Normal rate, regular rhythm and normal heart sounds.   Pulmonary/Chest: Effort normal and breath sounds normal. No stridor. She has no wheezes. She has no rales.   Musculoskeletal: She exhibits no edema or tenderness.   Lymphadenopathy:     She has no cervical adenopathy.   Neurological: She is alert and oriented to person, place, and time.   Skin: Skin is warm and dry. No rash noted.   Psychiatric: She has a normal mood and affect. Her behavior is normal.   Nursing note and vitals reviewed.      Vitals:    12/04/18 0917   BP: 120/84   BP Location: Right arm   Patient Position:  "Sitting   BP Method: Large (Manual)   Pulse: (!) 59   SpO2: 99%   Weight: 82.8 kg (182 lb 8.7 oz)   Height: 5' 5" (1.651 m)     Body mass index is 30.38 kg/m².    RESULTS: Reviewed labs from last 12 months. Reviewed CT 2012 and ultrasound 2018. Reviewed stress test 2015.    The 10-year ASCVD risk score (Davemara ALVARADO JrAbner, et al., 2013) is: 2.9%    Values used to calculate the score:      Age: 58 years      Sex: Female      Is Non- : No      Diabetic: No      Tobacco smoker: No      Systolic Blood Pressure: 120 mmHg      Is BP treated: No      HDL Cholesterol: 55 mg/dL      Total Cholesterol: 202 mg/dL     Assessment:       1. Urge incontinence    2. Elevated TSH    3. Mild intermittent asthma without complication    4. History of breast cancer    5. Need for influenza vaccination        Plan:   Mayra was seen today for establish care.    Diagnoses and all orders for this visit:    Urge incontinence:  Prior diagnosis, some improvement with tolterodine, however urgency symptoms persist.  Occasionally with incontinence, denies stress incontinence.  Provided written instructions for Kegel exercises along with bladder training exercises, begin these in refer to Urogynecology for further evaluation.  -     Ambulatory consult to Urogynecology    Elevated TSH:  Prior diagnosis, along with low free T4.  Patient was started on levothyroxine, has not noted any change in symptoms, TSH level has normalized.  Unclear if there is underlying thyroid disease or if this was due to mild fluctuation, we will try stopping levothyroxine for 3 months and recheck the levels.  If the TSH remains normal we can continue to monitor yearly.  -     TSH; Future    Mild intermittent asthma without complication:  Prior diagnosis, stable and not active at this time, continue to monitor.    History of breast cancer:  Prior dx, stable, continue follow up with Oncology.    Need for influenza vaccination    Follow-up in about 6 " months (around 6/4/2019) for follow up visit. Labs in 10-12 weeks.  Jerod Humphreys MD  Internal Medicine    Portions of this note were completed using medical dictation software. Please excuse typographical or syntax errors that were missed on review.

## 2018-12-04 NOTE — PATIENT INSTRUCTIONS
Please hold the levothyroxine. We will recheck the levels in 10-12 weeks. If before then you notice any symptoms of hypothyroidism please notify the office.      Kegel Exercises  Kegel exercises dont need special clothing or equipment. Theyre easy to learn and simple to do. And if you do them right, no one can tell youre doing them, so they can be done almost anywhere. Your healthcare provider, nurse, or physical therapist can answer any questions you have and help you get started.    A weak pelvic floor   The pelvic floor muscles may weaken due to aging, pregnancy and vaginal childbirth, injury, surgery, chronic cough, or lack of exercise. If the pelvic floor is weak, your bladder and other pelvic organs may sag out of place. The urethra may also open too easily and allow urine to leak out. Kegel exercises can help you strengthen your pelvic floor muscles. Then they can better support the pelvic organs and control urine flow.  How Kegel exercises are done  Try each of the Kegel exercises described below. When youre doing them, try not to move your leg, buttock, or stomach muscles.  · Contract as if you were stopping your urine stream. But do it when youre not urinating.  · Tighten your rectum as if trying not to pass gas. Contract your anus, but dont move your buttocks.  · You may place a finger or 2 in the vagina and squeeze your finger with your vagina to learn which muscles to tighten.  Try to hold each Kegel for a slow count to 5. You probably wont be able to hold them for that long at first. But keep practicing. It will get easier as your pelvic floor gets stronger. Eventually, special weights that you place in your vagina may be recommended to help make your Kegels even more effective. Visit your healthcare provider if you have difficulties doing Kegel exercises.  Helpful hints  Here are some tips to follow:  · Do your Kegels as often as you can. The more you do them, the faster youll feel the  results.  · Pick an activity you do often as a reminder. For instance, do your Kegels every time you sit down.  · Tighten your pelvic floor before you sneeze, get up from a chair, cough, laugh, or lift. This protects your pelvic floor from injury and can help prevent urine leakage.   Date Last Reviewed: 8/5/2015  © 1438-4423 [employer]. [address] [place] [address], [place] [address]. All rights reserved. This information is not intended as a substitute for professional medical care. Always follow your healthcare professional's instructions.

## 2018-12-04 NOTE — TELEPHONE ENCOUNTER
L/M for pt to call me pamela nolank to [facility] her set up a new  Appt. In late January 2019      ----- Message from Viji Yañez sent at 12/4/2018 11:50 AM CST -----  Please contact patient for an appointment.  Thanks.  She can be reached at [phone]

## 2018-12-19 ENCOUNTER — OFFICE VISIT (OUTPATIENT)
Dept: OBSTETRICS AND GYNECOLOGY | Facility: CLINIC | Age: 58
End: 2018-12-19
Payer: COMMERCIAL

## 2018-12-19 VITALS
HEIGHT: 65 IN | BODY MASS INDEX: 30.85 KG/M2 | DIASTOLIC BLOOD PRESSURE: 66 MMHG | SYSTOLIC BLOOD PRESSURE: 106 MMHG | WEIGHT: 185.19 LBS

## 2018-12-19 DIAGNOSIS — Z12.72 SPECIAL SCREENING FOR MALIGNANT NEOPLASM OF VAGINA: ICD-10-CM

## 2018-12-19 DIAGNOSIS — Z87.42 HISTORY OF ABNORMAL CERVICAL PAP SMEAR: ICD-10-CM

## 2018-12-19 DIAGNOSIS — Z01.419 WELL WOMAN EXAM WITH ROUTINE GYNECOLOGICAL EXAM: Primary | ICD-10-CM

## 2018-12-19 PROCEDURE — 3074F SYST BP LT 130 MM HG: CPT | Mod: CPTII,S$GLB,, | Performed by: OBSTETRICS & GYNECOLOGY

## 2018-12-19 PROCEDURE — 99386 PREV VISIT NEW AGE 40-64: CPT | Mod: S$GLB,,, | Performed by: OBSTETRICS & GYNECOLOGY

## 2018-12-19 PROCEDURE — 88175 CYTOPATH C/V AUTO FLUID REDO: CPT

## 2018-12-19 PROCEDURE — 3078F DIAST BP <80 MM HG: CPT | Mod: CPTII,S$GLB,, | Performed by: OBSTETRICS & GYNECOLOGY

## 2018-12-19 PROCEDURE — 99999 PR PBB SHADOW E&M-EST. PATIENT-LVL III: CPT | Mod: PBBFAC,,, | Performed by: OBSTETRICS & GYNECOLOGY

## 2018-12-19 NOTE — PROGRESS NOTES
"OBSTETRIC HISTORY:   OB History      Para Term  AB Living    2 2 2     2    SAB TAB Ectopic Multiple Live Births            2           COMPREHENSIVE GYN HISTORY:  PAP History: Reports abnormal Paps. Date: Several years ago . Treatment: [facility]. > 30 years ago.  Infection History: Denies STDs. Denies PID.  Benign History: Reports uterine fibroids. Reports ovarian cysts. Reports endometriosis. Denies other conditions.  Cancer History: REPORTS BREAST CANCER diagnosed at 37 y/o Denies cervical cancer. Denies uterine cancer or hyperplasia. Denies ovarian cancer. Denies vulvar cancer or pre-cancer. Denies vaginal cancer or precancer.Denies colon cancer.  Sexual Activity History: Reports being sexually active.  Dysmenorrhea History: Not applicable.  TVH/BSO    HPI:   58 y.o. No LMP recorded. Patient has had a hysterectomy.    Patient is  here for her annual gynecologic exam.  She has complaints of dryness. She denies bladder, bowel and breast complaints.    ROS:  GENERAL: Denies weight gain or weight loss. Feeling well overall.   SKIN: Denies rash or lesions.   HEAD: Denies headache.   NODES: Denies enlarged lymph nodes.   CHEST: Denies shortness of breath.   ABDOMEN: No abdominal pain, constipation, diarrhea, nausea, vomiting or rectal bleeding.   URINARY: No frequency, dysuria, hematuria, or burning on urination.  REPRODUCTIVE: See HPI.   BREASTS: The patient denies pain, lumps, or nipple discharge.   HEMATOLOGIC: No easy bruisability.   MUSCULOSKELETAL: Denies joint pain or back pain.   NEUROLOGIC: Denies weakness.   PSYCHIATRIC: Denies depression, anxiety or mood swings.        PE:   /66 (BP Location: Right arm)   Ht 5' 5" (1.651 m)   Wt 84 kg (185 lb 3 oz)   BMI 30.82 kg/m²   APPEARANCE: Well nourished, well developed, in no acute distress.  NECK: Neck symmetric without thyromegaly.  NODES: No inguinal or cervical lymph node enlargement.  CHEST: Lungs clear to auscultation.  HEART: Regular rate " and rhythm, no murmurs, rubs or gallops.  ABDOMEN: Soft. No tenderness or masses. No hernias.  BREASTS: Symmetrical, no skin changes or visible lesions. No palpable masses, nipple discharge or adenopathy bilaterally.  VULVA: No lesions. Normal female genitalia.  URETHRAL MEATUS: Normal size and location, no lesions, no prolapse.  URETHRA: No masses, tenderness, prolapse or scarring.  VAGINA: Atrophic and not well rugated, no discharge, no significant cystocele or rectocele.  CERVIX AND UTERUS SURGICALLY ABSENT.   ADNEXA: No masses or tenderness.    PROCEDURES:  Pap smear -- history of CKC greater than 30 years ago but no Pap smear x 8 years    Assessment:  Normal Gynecologic Exam  Atrophy-- given uptodate handout    Plan:  Mammogram and Colonoscopy if indicated by current recommendations.  Return to clinic in one year or for any problems or complaints.    Counseling:  Patient was counseled today on A.C.S. Pap guidelines and recommendations for yearly pelvic exams and monthly self breast exams; to see her PCP for other health maintenance. Regular exercise and healthy diet.

## 2018-12-28 ENCOUNTER — TELEPHONE (OUTPATIENT)
Dept: HEMATOLOGY/ONCOLOGY | Facility: CLINIC | Age: 58
End: 2018-12-28

## 2018-12-28 NOTE — TELEPHONE ENCOUNTER
Returned call to patient at this number, left message per request, to inform that documents were received to fax number. Will be completed and returned to where necessary.

## 2018-12-28 NOTE — TELEPHONE ENCOUNTER
----- Message from Maximus Perry sent at 12/28/2018 10:00 AM CST -----  Contact: Patient  Patient has faxed over wellness screening document, and wants to confirm that the office has received it.      Contact:: [phone](leave a message)

## 2019-02-13 ENCOUNTER — OFFICE VISIT (OUTPATIENT)
Dept: DERMATOLOGY | Facility: CLINIC | Age: 59
End: 2019-02-13
Payer: COMMERCIAL

## 2019-02-13 DIAGNOSIS — L81.9 HYPERPIGMENTATION: ICD-10-CM

## 2019-02-13 DIAGNOSIS — L28.0 LICHEN SIMPLEX: ICD-10-CM

## 2019-02-13 DIAGNOSIS — L81.4 LENTIGINES: Primary | ICD-10-CM

## 2019-02-13 DIAGNOSIS — L91.8 CUTANEOUS SKIN TAGS: ICD-10-CM

## 2019-02-13 DIAGNOSIS — L82.1 SEBORRHEIC KERATOSES: ICD-10-CM

## 2019-02-13 PROCEDURE — 99999 PR PBB SHADOW E&M-EST. PATIENT-LVL III: ICD-10-PCS | Mod: PBBFAC,,, | Performed by: DERMATOLOGY

## 2019-02-13 PROCEDURE — 99999 PR PBB SHADOW E&M-EST. PATIENT-LVL III: CPT | Mod: PBBFAC,,, | Performed by: DERMATOLOGY

## 2019-02-13 PROCEDURE — 99214 OFFICE O/P EST MOD 30 MIN: CPT | Mod: S$GLB,,, | Performed by: DERMATOLOGY

## 2019-02-13 PROCEDURE — 99214 PR OFFICE/OUTPT VISIT, EST, LEVL IV, 30-39 MIN: ICD-10-PCS | Mod: S$GLB,,, | Performed by: DERMATOLOGY

## 2019-02-13 RX ORDER — FLUOCINONIDE 0.5 MG/G
CREAM TOPICAL 2 TIMES DAILY
Qty: 30 G | Refills: 1 | Status: SHIPPED | OUTPATIENT
Start: 2019-02-13 | End: 2019-06-25 | Stop reason: SDUPTHER

## 2019-02-13 RX ORDER — TRETINOIN 0.25 MG/G
CREAM TOPICAL NIGHTLY
Qty: 45 G | Refills: 1 | Status: SHIPPED | OUTPATIENT
Start: 2019-02-13 | End: 2023-11-21 | Stop reason: SDUPTHER

## 2019-02-13 NOTE — PROGRESS NOTES
Subjective:       Patient ID:  Mayra Crawford is a 58 y.o. female who presents for   Chief Complaint   Patient presents with    Spot     59 y/o F presents with  for follow up visit for spot on back x 4 months, itching and discolored. She and her  admit to scratching at it regularly.  No tx other than lotion. She has several other questions about discoloration on her face and under her eyes.  She would like to discuss treatment options    Last OV with me 10/5/16    No skin cancer. Denies family h/o melanoma.     Past Medical History:  Asthma  1992: Breast cancer      Comment:  left breast IDC  Chronic constipation  12/2006: Genetic testing      Comment:  BRACAnalysis with rearrangement negative for mutation  Heart murmur  kidney stone        Review of Systems   Constitutional: Negative for fever, chills and fatigue.   Skin: Positive for itching, dry skin and activity-related sunscreen use (sometimes). Negative for rash, daily sunscreen use and wears hat.        Objective:    Physical Exam   Constitutional: She appears well-developed and well-nourished.   Neurological: She is alert and oriented to person, place, and time.   Psychiatric: She has a normal mood and affect.   Skin:   Areas Examined (abnormalities noted in diagram):   Head / Face Inspection Performed  Neck Inspection Performed  Chest / Axilla Inspection Performed  Abdomen Inspection Performed  Back Inspection Performed  RUE Inspected  LUE Inspection Performed                   Diagram Legend     Erythematous scaling macule/papule c/w actinic keratosis       Vascular papule c/w angioma      Pigmented verrucoid papule/plaque c/w seborrheic keratosis      Yellow umbilicated papule c/w sebaceous hyperplasia      Irregularly shaped tan macule c/w lentigo     1-2 mm smooth white papules consistent with Milia      Movable subcutaneous cyst with punctum c/w epidermal inclusion cyst      Subcutaneous movable cyst c/w pilar cyst      Firm pink to  brown papule c/w dermatofibroma      Pedunculated fleshy papule(s) c/w skin tag(s)      Evenly pigmented macule c/w junctional nevus     Mildly variegated pigmented, slightly irregular-bordered macule c/w mildly atypical nevus      Flesh colored to evenly pigmented papule c/w intradermal nevus       Pink pearly papule/plaque c/w basal cell carcinoma      Erythematous hyperkeratotic cursted plaque c/w SCC      Surgical scar with no sign of skin cancer recurrence      Open and closed comedones      Inflammatory papules and pustules      Verrucoid papule consistent consistent with wart     Erythematous eczematous patches and plaques     Dystrophic onycholytic nail with subungual debris c/w onychomycosis     Umbilicated papule    Erythematous-base heme-crusted tan verrucoid plaque consistent with inflamed seborrheic keratosis     Erythematous Silvery Scaling Plaque c/w Psoriasis     See annotation      Assessment / Plan:        Lentigines  Encouraged daily sun protection as any sun exposure can exacerbate this hyperpigmentation  -     tretinoin (RETIN-A) 0.025 % cream; Apply topically every evening.  Dispense: 45 g; Refill: 1  Tretinoin cream to face to help lighten the sun spots  Ambi hydroquinone 2% twice daily    Cutaneous skin tags  Reassurance  Discussed LN    Lichen simplex chronicus  Encouraged patient to stop scratching/rubbing as this can exacerbate the condition  Ice packs to help alleviate itch sensation  -     fluocinonide 0.05% (LIDEX) 0.05 % cream; Apply topically 2 (two) times daily. back  Dispense: 30 g; Refill: 1    Seborrheic keratoses  These are benign inherited growths without a malignant potential. Reassurance given to patient. No treatment is necessary.     Hyperpigmentation-infraorbital  Recommended eye cream containing caffeine to help with puffiness  Elevate head when sleeping, antihistamine daily, and cold compresses may also help with puffiness  NeoCutis Lumiere Eye Cream           Follow-up  in about 6 months (around 8/13/2019).

## 2019-02-27 ENCOUNTER — LAB VISIT (OUTPATIENT)
Dept: LAB | Facility: HOSPITAL | Age: 59
End: 2019-02-27
Attending: INTERNAL MEDICINE
Payer: COMMERCIAL

## 2019-02-27 DIAGNOSIS — R79.89 ELEVATED TSH: ICD-10-CM

## 2019-02-27 LAB — TSH SERPL DL<=0.005 MIU/L-ACNC: 2.53 UIU/ML

## 2019-02-27 PROCEDURE — 36415 COLL VENOUS BLD VENIPUNCTURE: CPT | Mod: PO

## 2019-02-27 PROCEDURE — 84443 ASSAY THYROID STIM HORMONE: CPT

## 2019-02-28 ENCOUNTER — TELEPHONE (OUTPATIENT)
Dept: INTERNAL MEDICINE | Facility: CLINIC | Age: 59
End: 2019-02-28

## 2019-02-28 NOTE — LETTER
[facility]  [address]  [facility] [address]  Phone: [phone]  Fax: [phone]           Dear Mayra Crawford,    On December 4th you were instructed to stop levothyroxine (Synthroid).  Your TSH level is still normal. We would like to confirm that you are no longer taking this medication. you do not need to restart this medication at this time.  Please let us know so  can discontinue the medication off of your medication list.    Please let us know if you have any questions, thanks!

## 2019-02-28 NOTE — TELEPHONE ENCOUNTER
On December 4th patient was instructed to stop levothyroxine (Synthroid).  Her TSH level is still normal.  Please confirm that she is no longer taking this medication.  She does not need to restart at this time.  Please let me know so I can discontinue the medication off of her med list.

## 2019-03-18 ENCOUNTER — TELEPHONE (OUTPATIENT)
Dept: INTERNAL MEDICINE | Facility: CLINIC | Age: 59
End: 2019-03-18

## 2019-03-18 NOTE — TELEPHONE ENCOUNTER
----- Message from Taco Oneal sent at 3/18/2019  8:11 AM CDT -----  Contact: self/[phone] (pt's )  Type: Orders Request    What orders/ testing are being requested? Urine #possible UTI#    Is there a future appointment scheduled for the patient with PCP? No     When? N/A    Would you prefer a response via [employer]? No     Comments: Please call pt when lab orders are placed. [place]        Thank You

## 2019-03-18 NOTE — TELEPHONE ENCOUNTER
----- Message from Taco Oneal sent at 3/18/2019  8:11 AM CDT -----  Contact: self/[phone] (pt's )  Type: Orders Request    What orders/ testing are being requested? Urine #possible UTI#    Is there a future appointment scheduled for the patient with PCP? No     When? N/A    Would you prefer a response via [employer]? No     Comments: Please call pt when lab orders are placed. [place] Please advise.        Thank You

## 2019-03-18 NOTE — TELEPHONE ENCOUNTER
declined appointment today, he would like an order for her to do a urine at [facility] without an appointment.

## 2019-06-25 DIAGNOSIS — L28.0 LICHEN SIMPLEX: ICD-10-CM

## 2019-06-25 RX ORDER — FLUOCINONIDE 0.5 MG/G
CREAM TOPICAL 2 TIMES DAILY
Qty: 30 G | Refills: 1 | Status: SHIPPED | OUTPATIENT
Start: 2019-06-25 | End: 2020-05-19 | Stop reason: SDUPTHER

## 2019-07-01 NOTE — PATIENT INSTRUCTIONS
Recommended eye cream containing caffeine to help with puffiness  Elevate head when sleeping, antihistamine daily, and cold compresses may also help with puffiness    NeoCutis Lumiere Eye Cream    Tretinoin cream to face to help lighten the sun spots  Ambi hydroquinone 2% twice daily   Stable

## 2019-08-28 ENCOUNTER — OFFICE VISIT (OUTPATIENT)
Dept: DERMATOLOGY | Facility: CLINIC | Age: 59
End: 2019-08-28
Payer: COMMERCIAL

## 2019-08-28 VITALS — BODY MASS INDEX: 30.82 KG/M2 | WEIGHT: 185 LBS | HEIGHT: 65 IN

## 2019-08-28 DIAGNOSIS — R20.2 NOTALGIA PARESTHETICA: ICD-10-CM

## 2019-08-28 DIAGNOSIS — L82.1 SEBORRHEIC KERATOSES: ICD-10-CM

## 2019-08-28 DIAGNOSIS — L72.0 MILIUM: Primary | ICD-10-CM

## 2019-08-28 DIAGNOSIS — L28.0 LICHEN SIMPLEX: ICD-10-CM

## 2019-08-28 DIAGNOSIS — R23.8 VENOUS LAKE: ICD-10-CM

## 2019-08-28 PROCEDURE — 3008F BODY MASS INDEX DOCD: CPT | Mod: CPTII,S$GLB,, | Performed by: DERMATOLOGY

## 2019-08-28 PROCEDURE — 99213 OFFICE O/P EST LOW 20 MIN: CPT | Mod: S$GLB,,, | Performed by: DERMATOLOGY

## 2019-08-28 PROCEDURE — 99999 PR PBB SHADOW E&M-EST. PATIENT-LVL II: CPT | Mod: PBBFAC,,, | Performed by: DERMATOLOGY

## 2019-08-28 PROCEDURE — 3008F PR BODY MASS INDEX (BMI) DOCUMENTED: ICD-10-PCS | Mod: CPTII,S$GLB,, | Performed by: DERMATOLOGY

## 2019-08-28 PROCEDURE — 99213 PR OFFICE/OUTPT VISIT, EST, LEVL III, 20-29 MIN: ICD-10-PCS | Mod: S$GLB,,, | Performed by: DERMATOLOGY

## 2019-08-28 PROCEDURE — 99999 PR PBB SHADOW E&M-EST. PATIENT-LVL II: ICD-10-PCS | Mod: PBBFAC,,, | Performed by: DERMATOLOGY

## 2019-08-28 NOTE — PROGRESS NOTES
Subjective:       Patient ID:  Mayra Crawford is a 59 y.o. female who presents for   Chief Complaint   Patient presents with    Spot     middle right side of back     Last OV 02/13/2019.  60 y/o F presents for rash on middle back x 1 year. It is still itchy but significantly improved with fluocinonide 0.05 % cream bid.  The color and texture are basically back to normal.  She does have a h/o neck pain    Denies history of NMSC  Denies family history of melanoma  .  Past Medical History:   Diagnosis Date    Asthma     Breast cancer 1992    left breast IDC    Chronic constipation     Genetic testing 12/2006    BRACAnalysis with rearrangement negative for mutation    Heart murmur     kidney stone      Review of Systems   Skin: Positive for itching, dry skin and activity-related sunscreen use (sometimes). Negative for rash, daily sunscreen use and wears hat.        Objective:    Physical Exam   Constitutional: She appears well-developed and well-nourished.   Neurological: She is alert and oriented to person, place, and time.   Psychiatric: She has a normal mood and affect.   Skin:   Areas Examined (abnormalities noted in diagram):   Scalp / Hair Palpated and Inspected  Head / Face Inspection Performed  Neck Inspection Performed  Back Inspection Performed                   Diagram Legend     Erythematous scaling macule/papule c/w actinic keratosis       Vascular papule c/w angioma      Pigmented verrucoid papule/plaque c/w seborrheic keratosis      Yellow umbilicated papule c/w sebaceous hyperplasia      Irregularly shaped tan macule c/w lentigo     1-2 mm smooth white papules consistent with Milia      Movable subcutaneous cyst with punctum c/w epidermal inclusion cyst      Subcutaneous movable cyst c/w pilar cyst      Firm pink to brown papule c/w dermatofibroma      Pedunculated fleshy papule(s) c/w skin tag(s)      Evenly pigmented macule c/w junctional nevus     Mildly variegated pigmented, slightly  irregular-bordered macule c/w mildly atypical nevus      Flesh colored to evenly pigmented papule c/w intradermal nevus       Pink pearly papule/plaque c/w basal cell carcinoma      Erythematous hyperkeratotic cursted plaque c/w SCC      Surgical scar with no sign of skin cancer recurrence      Open and closed comedones      Inflammatory papules and pustules      Verrucoid papule consistent consistent with wart     Erythematous eczematous patches and plaques     Dystrophic onycholytic nail with subungual debris c/w onychomycosis     Umbilicated papule    Erythematous-base heme-crusted tan verrucoid plaque consistent with inflamed seborrheic keratosis     Erythematous Silvery Scaling Plaque c/w Psoriasis     See annotation      Assessment / Plan:        Milium  Reassurance     Seborrheic keratoses  These are benign inherited growths without a malignant potential. Reassurance given to patient. No treatment is necessary.     Lichen simplex  Significant improvement now with only PIPA remaining    Notalgia Paresthetica  ketamine 1% lidocaine 5% amitryptiline 2% cream bid to help with the underlying pruritis      [place]  Reassurance         Follow up in about 6 months (around 2/28/2020).

## 2019-09-27 DIAGNOSIS — N31.9 BLADDER DYSFUNCTION: ICD-10-CM

## 2019-09-27 DIAGNOSIS — I10 ESSENTIAL HYPERTENSION: ICD-10-CM

## 2019-09-27 DIAGNOSIS — F32.89 OTHER DEPRESSION: ICD-10-CM

## 2019-09-27 RX ORDER — TOLTERODINE 4 MG/1
CAPSULE, EXTENDED RELEASE ORAL
Qty: 90 CAPSULE | Refills: 3 | Status: SHIPPED | OUTPATIENT
Start: 2019-09-27 | End: 2020-09-15

## 2019-09-27 RX ORDER — CLONIDINE HYDROCHLORIDE 0.1 MG/1
TABLET ORAL
Qty: 90 TABLET | Refills: 3 | Status: SHIPPED | OUTPATIENT
Start: 2019-09-27 | End: 2020-09-15

## 2019-09-27 RX ORDER — SERTRALINE HYDROCHLORIDE 50 MG/1
TABLET, FILM COATED ORAL
Qty: 90 TABLET | Refills: 3 | Status: SHIPPED | OUTPATIENT
Start: 2019-09-27 | End: 2020-09-15

## 2019-10-03 ENCOUNTER — HOSPITAL ENCOUNTER (OUTPATIENT)
Dept: RADIOLOGY | Facility: HOSPITAL | Age: 59
Discharge: HOME | End: 2019-10-03
Attending: INTERNAL MEDICINE
Payer: COMMERCIAL

## 2019-10-03 VITALS — HEIGHT: 65 IN | BODY MASS INDEX: 30.81 KG/M2 | WEIGHT: 184.94 LBS

## 2019-10-03 DIAGNOSIS — Z85.3 HISTORY OF BREAST CANCER: ICD-10-CM

## 2019-10-03 DIAGNOSIS — Z85.3 HISTORY OF BREAST CANCER: Primary | ICD-10-CM

## 2019-10-03 PROCEDURE — 77062 BREAST TOMOSYNTHESIS BI: CPT | Mod: 26,,, | Performed by: RADIOLOGY

## 2019-10-03 PROCEDURE — 77066 MAMMO DIGITAL DIAGNOSTIC BILAT WITH TOMOSYNTHESIS_CAD: ICD-10-PCS | Mod: 26,,, | Performed by: RADIOLOGY

## 2019-10-03 PROCEDURE — 77066 DX MAMMO INCL CAD BI: CPT | Mod: 26,,, | Performed by: RADIOLOGY

## 2019-10-03 PROCEDURE — 77066 DX MAMMO INCL CAD BI: CPT | Mod: TC,PO

## 2019-10-03 PROCEDURE — 77062 MAMMO DIGITAL DIAGNOSTIC BILAT WITH TOMOSYNTHESIS_CAD: ICD-10-PCS | Mod: 26,,, | Performed by: RADIOLOGY

## 2019-10-09 ENCOUNTER — TELEPHONE (OUTPATIENT)
Dept: HEMATOLOGY/ONCOLOGY | Facility: CLINIC | Age: 59
End: 2019-10-09

## 2019-10-09 ENCOUNTER — LAB VISIT (OUTPATIENT)
Dept: LAB | Facility: HOSPITAL | Age: 59
End: 2019-10-09
Payer: COMMERCIAL

## 2019-10-09 ENCOUNTER — OFFICE VISIT (OUTPATIENT)
Dept: HEMATOLOGY/ONCOLOGY | Facility: CLINIC | Age: 59
End: 2019-10-09
Payer: COMMERCIAL

## 2019-10-09 VITALS
SYSTOLIC BLOOD PRESSURE: 129 MMHG | OXYGEN SATURATION: 99 % | RESPIRATION RATE: 20 BRPM | TEMPERATURE: 98 F | HEART RATE: 73 BPM | HEIGHT: 65 IN | DIASTOLIC BLOOD PRESSURE: 69 MMHG | BODY MASS INDEX: 29.9 KG/M2 | WEIGHT: 179.44 LBS

## 2019-10-09 DIAGNOSIS — J45.20 MILD INTERMITTENT ASTHMA WITHOUT COMPLICATION: ICD-10-CM

## 2019-10-09 DIAGNOSIS — F41.9 ANXIETY: ICD-10-CM

## 2019-10-09 DIAGNOSIS — Z85.3 HISTORY OF BREAST CANCER: ICD-10-CM

## 2019-10-09 DIAGNOSIS — E03.9 ACQUIRED HYPOTHYROIDISM: ICD-10-CM

## 2019-10-09 DIAGNOSIS — Z85.3 HISTORY OF BREAST CANCER: Primary | ICD-10-CM

## 2019-10-09 DIAGNOSIS — R42 VERTIGO: ICD-10-CM

## 2019-10-09 LAB
CHOLEST SERPL-MCNC: 205 MG/DL (ref 120–199)
CHOLEST/HDLC SERPL: 4.3 {RATIO} (ref 2–5)
HDLC SERPL-MCNC: 48 MG/DL (ref 40–75)
HDLC SERPL: 23.4 % (ref 20–50)
LDLC SERPL CALC-MCNC: 142 MG/DL (ref 63–159)
NONHDLC SERPL-MCNC: 157 MG/DL
T4 FREE SERPL-MCNC: 0.77 NG/DL (ref 0.71–1.51)
TRIGL SERPL-MCNC: 75 MG/DL (ref 30–150)
TSH SERPL DL<=0.005 MIU/L-ACNC: 3.98 UIU/ML (ref 0.4–4)

## 2019-10-09 PROCEDURE — 99999 PR PBB SHADOW E&M-EST. PATIENT-LVL III: CPT | Mod: PBBFAC,,, | Performed by: INTERNAL MEDICINE

## 2019-10-09 PROCEDURE — 99999 PR PBB SHADOW E&M-EST. PATIENT-LVL III: ICD-10-PCS | Mod: PBBFAC,,, | Performed by: INTERNAL MEDICINE

## 2019-10-09 PROCEDURE — 3008F PR BODY MASS INDEX (BMI) DOCUMENTED: ICD-10-PCS | Mod: CPTII,S$GLB,, | Performed by: INTERNAL MEDICINE

## 2019-10-09 PROCEDURE — 99214 OFFICE O/P EST MOD 30 MIN: CPT | Mod: S$GLB,,, | Performed by: INTERNAL MEDICINE

## 2019-10-09 PROCEDURE — 3078F DIAST BP <80 MM HG: CPT | Mod: CPTII,S$GLB,, | Performed by: INTERNAL MEDICINE

## 2019-10-09 PROCEDURE — 36415 COLL VENOUS BLD VENIPUNCTURE: CPT

## 2019-10-09 PROCEDURE — 3074F PR MOST RECENT SYSTOLIC BLOOD PRESSURE < 130 MM HG: ICD-10-PCS | Mod: CPTII,S$GLB,, | Performed by: INTERNAL MEDICINE

## 2019-10-09 PROCEDURE — 99214 PR OFFICE/OUTPT VISIT, EST, LEVL IV, 30-39 MIN: ICD-10-PCS | Mod: S$GLB,,, | Performed by: INTERNAL MEDICINE

## 2019-10-09 PROCEDURE — 3078F PR MOST RECENT DIASTOLIC BLOOD PRESSURE < 80 MM HG: ICD-10-PCS | Mod: CPTII,S$GLB,, | Performed by: INTERNAL MEDICINE

## 2019-10-09 PROCEDURE — 3074F SYST BP LT 130 MM HG: CPT | Mod: CPTII,S$GLB,, | Performed by: INTERNAL MEDICINE

## 2019-10-09 PROCEDURE — 3008F BODY MASS INDEX DOCD: CPT | Mod: CPTII,S$GLB,, | Performed by: INTERNAL MEDICINE

## 2019-10-09 PROCEDURE — 84443 ASSAY THYROID STIM HORMONE: CPT

## 2019-10-09 PROCEDURE — 80061 LIPID PANEL: CPT

## 2019-10-09 PROCEDURE — 84439 ASSAY OF FREE THYROXINE: CPT

## 2019-10-09 RX ORDER — ALPRAZOLAM 0.5 MG/1
0.5 TABLET ORAL 3 TIMES DAILY
Qty: 5 TABLET | Refills: 0 | Status: SHIPPED | OUTPATIENT
Start: 2019-10-09 | End: 2022-10-05

## 2019-10-09 RX ORDER — MECLIZINE HCL 12.5 MG 12.5 MG/1
12.5 TABLET ORAL 3 TIMES DAILY PRN
Qty: 20 TABLET | Refills: 0 | Status: SHIPPED | OUTPATIENT
Start: 2019-10-09

## 2019-10-09 RX ORDER — ALBUTEROL SULFATE 90 UG/1
2 AEROSOL, METERED RESPIRATORY (INHALATION) EVERY 6 HOURS PRN
Qty: 18 G | Refills: 12 | Status: SHIPPED | OUTPATIENT
Start: 2019-10-09 | End: 2024-03-13

## 2019-10-09 NOTE — TELEPHONE ENCOUNTER
Spoke with patient to inform her of the names of the GYN drs Dr Lewis has recommended for her. Pt grateful for this.     ----- Message from Delfin Lewis MD sent at 10/9/2019  5:17 PM CDT -----  Contact: Pt   [facility]  ----- Message -----  From: Shania Lemus RN  Sent: 10/9/2019   3:43 PM CDT  To: Delfin Lewis MD    Would you have any recommendations for Ms Crawford?    ----- Message -----  From: Angle Diaz  Sent: 10/9/2019  11:54 AM CDT  To: Joshua SANTIAGO Staff    Pt called to speak with nurse needs a copy of a list of GYN Doctors or leave a message of a few Doctors on her voice mail   Callback#[phone]  Thank You

## 2019-10-09 NOTE — PROGRESS NOTES
Subjective:       Patient ID: Mayra Crawford is a 59 y.o. female.    Chief Complaint: No chief complaint on file.    HPI Ms. Crawford returned to clinic for followup of remote history of carcinoma of  the left breast.    Overall, she has been feeling well.  Her only complaint is some increased constipation for which she is taking Colace and MiraLax.  Her last colonoscopy was in 2015 was unremarkable.  She has no shortness of breath.  Her asthma has been under good control.  Appetite has been good.      Breast history diagnosed in 1992 treated with lumpectomy and adjuvant       chemotherapy with 5-FU and methotrexate on protocol NSABP B-20.  She then    took 5 years of tamoxifen and completed in 1997.          Review of Systems   Constitutional: Negative for activity change, appetite change and unexpected weight change.   Respiratory: Negative for cough, shortness of breath and wheezing.    Cardiovascular: Negative for chest pain.   Gastrointestinal: Positive for constipation. Negative for abdominal distention, diarrhea, nausea and vomiting.   Musculoskeletal: Negative for back pain and neck pain.   Skin: Negative for rash.   Psychiatric/Behavioral: Negative for dysphoric mood. The patient is not nervous/anxious.        Objective:      Physical Exam   Constitutional: She appears well-developed and well-nourished.   HENT:   Mouth/Throat: No oropharyngeal exudate.   Eyes: No scleral icterus.   Cardiovascular: Normal rate and regular rhythm.   Pulmonary/Chest: Effort normal and breath sounds normal. She has no wheezes. She has no rales. Right breast exhibits no mass, no nipple discharge and no skin change. Left breast exhibits no mass, no nipple discharge and no skin change.       Abdominal: Soft. She exhibits no mass. There is no tenderness.   Lymphadenopathy:     She has no cervical adenopathy.     She has no axillary adenopathy.        Right: No supraclavicular adenopathy present.        Left: No supraclavicular  adenopathy present.   Psychiatric: She has a normal mood and affect. Her behavior is normal. Thought content normal.   Vitals reviewed.      Assessment:     mammogram on October 3rd showed no evidence of malignancy.  1. History of breast cancer    2. Acquired hypothyroidism    3. Mild intermittent asthma without complication        Plan:         Return in 1 year with mammograms.

## 2020-01-28 NOTE — PROGRESS NOTES
Subjective:       Patient ID: Mayra Crawford is a 59 y.o. female.    Chief Complaint:  Well Woman (NP  --  Annual, last vaginal pap 2018, Normal (Hpv Not done)  --  last mmg 10-3-19, birads 2 (Hx of Breast CA)   --  colonoscopy 10/2013, Normal )      History of Present Illness.  Mayra Crawford is a 59 y.o. female.  She has no breast or urinary symptoms.  She has no postcoital bleeding, pelvic pain or vaginal discharge.  She complains of 3 little bumps on the vulva for a few months.  Doing well on Clonidine.  No menopausal issues.    Current HRT Regimen:  None - h/o breast cancer    GYN & OB History  No LMP recorded (lmp unknown). Patient has had a hysterectomy.   Pap: 1/3/2019 Normal  Mammogram: 10/3/19 Birads 2  Colonoscopy: 10/2013 Normal  DEXA: Years ago  PCP:  None Dr. Lewis - oncologist  Routine Labs:   10/2019    OB History    Para Term  AB Living   2 2 2     2   SAB TAB Ectopic Multiple Live Births           2      # Outcome Date GA Lbr Chris/2nd Weight Sex Delivery Anes PTL Lv   2 Term  40w0d  3.175 kg (7 lb) M Vag-Spont EPI  ANTONI   1 Term  40w0d  3.629 kg (8 lb) F Vag-Spont EPI  ANTONI      Obstetric Comments   Menarche 8       Past Medical History:   Diagnosis Date    Abnormal Pap smear of cervix     H/O of [facility]      Asthma     Breast cancer     left breast IDC    Chronic constipation     Genetic testing 2006    BRACAnalysis with rearrangement negative for mutation    Heart murmur     kidney stone      Past Surgical History:   Procedure Laterality Date    ANAL FISTULOTOMY      BILATERAL SALPINGOOPHORECTOMY      BREAST BIOPSY Right     excisional biopsy- benign    BREAST LUMPECTOMY Left      with ALND for IDC    CERVICAL CONIZATION   W/ LASER      CHOLECYSTECTOMY  2018    at [facility]    GALLBLADDER SURGERY  2018    HYSTERECTOMY  2002    TVH/ BSO       OOPHORECTOMY       Family History   Problem Relation Age of Onset    Hypertension  Mother     Nephrolithiasis Mother     Cancer Father         lung    Diabetes Father     Heart disease Father     Aortic aneurysm Father         Abdominal    Lung cancer Father     Breast cancer Maternal Aunt         late 50s    Cancer Maternal Aunt         breast    Breast cancer Maternal Aunt     Breast cancer Paternal Aunt     Ovarian cancer Neg Hx     Heart attack Neg Hx     Anal fissures Neg Hx     Colon cancer Neg Hx     Esophageal cancer Neg Hx      Social History     Tobacco Use    Smoking status: Never Smoker    Smokeless tobacco: Never Used   Substance Use Topics    Alcohol use: No     Alcohol/week: 0.0 standard drinks    Drug use: No       Current Outpatient Medications:     albuterol (PROVENTIL/VENTOLIN HFA) 90 mcg/actuation inhaler, Inhale 2 puffs into the lungs every 6 (six) hours as needed for Wheezing., Disp: 18 g, Rfl: 12    CETIRIZINE HCL (ZYRTEC ORAL), daily as needed. , Disp: , Rfl:     cloNIDine (CATAPRES) 0.1 MG tablet, TAKE 1 TABLET BY MOUTH EVERY DAY, Disp: 90 tablet, Rfl: 3    docusate sodium (STOOL SOFTENER) 50 MG capsule, Take by mouth 2 (two) times daily., Disp: , Rfl:     fluticasone-salmeterol 250-50 mcg/dose (ADVAIR DISKUS) 250-50 mcg/dose diskus inhaler, Inhale 1 puff into the lungs 2 (two) times daily as needed. 1 Disk with Device Inhalation Twice a day, Disp: 60 each, Rfl: 12    meclizine (ANTIVERT) 12.5 mg tablet, Take 1 tablet (12.5 mg total) by mouth 3 (three) times daily as needed., Disp: 20 tablet, Rfl: 0    mometasone (ASMANEX TWISTHALER) 220 mcg (60 doses) AePB, 2 puffs inhaled once daily. Disp one canister, Disp: 1 g, Rfl: 1    ondansetron (ZOFRAN-ODT) 4 MG TbDL, Take 2 tablets (8 mg total) by mouth every 8 (eight) hours as needed., Disp: 5 tablet, Rfl: 0    sertraline (ZOLOFT) 50 MG tablet, TAKE 1 TABLET BY MOUTH EVERY DAY, Disp: 90 tablet, Rfl: 3    tolterodine (DETROL LA) 4 MG 24 hr capsule, TAKE 1 CAPSULE BY MOUTH EVERY DAY IN THE MORNING,  "Disp: 90 capsule, Rfl: 3    tretinoin (RETIN-A) 0.025 % cream, Apply topically every evening., Disp: 45 g, Rfl: 1    ALPRAZolam (XANAX) 0.5 MG tablet, Take 1 tablet (0.5 mg total) by mouth 3 (three) times daily. Take 30 minute before flying (Patient not taking: Reported on 1/29/2020), Disp: 5 tablet, Rfl: 0    diazePAM (VALIUM) 5 MG tablet, TAKE 1 TABLET IN THE EVENING BEFORE APPOINTMENT AND 1 TABLET 1 HOUR BEFORE APPOINTMENT, Disp: , Rfl:     epinephrine (EPIPEN) 0.3 mg/0.3 mL (1:1,000) AtIn, Inject 0.3 mLs (0.3 mg total) into the muscle once. 1 Pen Injector Intramuscular .  use as directed, Disp: 0.3 mL, Rfl: 0    fluocinonide 0.05% (LIDEX) 0.05 % cream, Apply topically 2 (two) times daily. back, Disp: 30 g, Rfl: 1    Review of patient's allergies indicates:   Allergen Reactions    Penicillins      Other reaction(s): Rash    Other Rash and Other (See Comments)     Other reaction(s): Swelling  Other reaction(s): Sneezing  Other reaction(s): Shortness of breath    Pt reports she is allergic to meat       Review of Systems  Review of Systems   Constitutional: Negative for fatigue.   HENT: Negative for trouble swallowing.    Eyes: Negative for visual disturbance.   Respiratory: Negative for cough and shortness of breath.    Cardiovascular: Negative for chest pain.   Gastrointestinal: Negative for abdominal distention, abdominal pain, blood in stool, nausea and vomiting.   Genitourinary: Negative for difficulty urinating, dyspareunia, dysuria, flank pain, frequency, hematuria, pelvic pain, urgency, vaginal bleeding, vaginal discharge and vaginal pain.   Musculoskeletal: Negative for arthralgias.   Skin: Negative for rash.   Neurological: Negative for dizziness and headaches.   Psychiatric/Behavioral: Negative for sleep disturbance. The patient is not nervous/anxious.         Objective:     Vitals:    01/29/20 1033   BP: 122/80   Weight: 82 kg (180 lb 12.4 oz)   Height: 5' 5" (1.651 m)   PainSc: 0-No pain "     Body mass index is 30.08 kg/m².    Physical Exam:   Constitutional: She is oriented to person, place, and time. Vital signs are normal. She appears well-developed and well-nourished.    HENT:   Head: Normocephalic.     Neck: Normal range of motion. No thyromegaly present.     Pulmonary/Chest: Right breast exhibits no mass, no nipple discharge, no skin change, no tenderness and no swelling. Left breast exhibits no mass, no nipple discharge, no skin change, no tenderness and no swelling. Breasts are symmetrical.        Abdominal: Soft. Normal appearance and bowel sounds are normal. She exhibits no distension. There is no tenderness.     Genitourinary: Rectum normal and vagina normal. Rectal exam shows guaiac negative stool. Guaiac negative stool. Pelvic exam was performed with patient supine. There is no rash, tenderness, lesion or injury on the right labia. There is no rash, tenderness, lesion or injury on the left labia. Uterus is absent. Right adnexum displays no mass, no tenderness and no fullness. Left adnexum displays no mass, no tenderness and no fullness. No erythema in the vagina. No vaginal discharge found. Cervix exhibits absence.           Musculoskeletal: Normal range of motion.      Lymphadenopathy:        Right: No inguinal and no supraclavicular adenopathy present.        Left: No inguinal and no supraclavicular adenopathy present.    Neurological: She is alert and oriented to person, place, and time.    Skin: Skin is warm and dry.    Psychiatric: She has a normal mood and affect.        Assessment/ Plan:     Encounter for gynecological examination    Visit for screening mammogram  -     Cancel: Mammo Digital Screening Bilat w/ Eber; Future; Expected date: 01/29/2020    Menopause  -     DXA Bone Density Spine And Hip; Future; Expected date: 01/29/2020    Screening for cervical cancer  -     Liquid-Based Pap Smear, Screening      Patient requested pap  Self breast exam and mammography  discussed  Routine colonoscopy discussed.  Diet and exercise discussed.  Routine bone mineral density testing.  Yearly influenza vaccination discussed.  Follow-up with me in 1 year

## 2020-01-29 ENCOUNTER — OFFICE VISIT (OUTPATIENT)
Dept: OBSTETRICS AND GYNECOLOGY | Facility: CLINIC | Age: 60
End: 2020-01-29
Payer: COMMERCIAL

## 2020-01-29 VITALS
WEIGHT: 180.75 LBS | HEIGHT: 65 IN | SYSTOLIC BLOOD PRESSURE: 122 MMHG | BODY MASS INDEX: 30.12 KG/M2 | DIASTOLIC BLOOD PRESSURE: 80 MMHG

## 2020-01-29 DIAGNOSIS — Z12.4 SCREENING FOR CERVICAL CANCER: ICD-10-CM

## 2020-01-29 DIAGNOSIS — Z78.0 MENOPAUSE: ICD-10-CM

## 2020-01-29 DIAGNOSIS — Z01.419 ENCOUNTER FOR GYNECOLOGICAL EXAMINATION: Primary | ICD-10-CM

## 2020-01-29 DIAGNOSIS — Z12.31 VISIT FOR SCREENING MAMMOGRAM: ICD-10-CM

## 2020-01-29 PROCEDURE — 99396 PR PREVENTIVE VISIT,EST,40-64: ICD-10-PCS | Mod: S$GLB,,, | Performed by: OBSTETRICS & GYNECOLOGY

## 2020-01-29 PROCEDURE — 3079F DIAST BP 80-89 MM HG: CPT | Mod: CPTII,S$GLB,, | Performed by: OBSTETRICS & GYNECOLOGY

## 2020-01-29 PROCEDURE — 3074F PR MOST RECENT SYSTOLIC BLOOD PRESSURE < 130 MM HG: ICD-10-PCS | Mod: CPTII,S$GLB,, | Performed by: OBSTETRICS & GYNECOLOGY

## 2020-01-29 PROCEDURE — 99999 PR PBB SHADOW E&M-EST. PATIENT-LVL III: CPT | Mod: PBBFAC,,, | Performed by: OBSTETRICS & GYNECOLOGY

## 2020-01-29 PROCEDURE — 88175 CYTOPATH C/V AUTO FLUID REDO: CPT

## 2020-01-29 PROCEDURE — 99396 PREV VISIT EST AGE 40-64: CPT | Mod: S$GLB,,, | Performed by: OBSTETRICS & GYNECOLOGY

## 2020-01-29 PROCEDURE — 3074F SYST BP LT 130 MM HG: CPT | Mod: CPTII,S$GLB,, | Performed by: OBSTETRICS & GYNECOLOGY

## 2020-01-29 PROCEDURE — 3079F PR MOST RECENT DIASTOLIC BLOOD PRESSURE 80-89 MM HG: ICD-10-PCS | Mod: CPTII,S$GLB,, | Performed by: OBSTETRICS & GYNECOLOGY

## 2020-01-29 PROCEDURE — 99999 PR PBB SHADOW E&M-EST. PATIENT-LVL III: ICD-10-PCS | Mod: PBBFAC,,, | Performed by: OBSTETRICS & GYNECOLOGY

## 2020-01-29 RX ORDER — DIAZEPAM 5 MG/1
TABLET ORAL
COMMUNITY
Start: 2019-10-23

## 2020-02-19 ENCOUNTER — HOSPITAL ENCOUNTER (OUTPATIENT)
Dept: RADIOLOGY | Facility: HOSPITAL | Age: 60
Discharge: HOME | End: 2020-02-19
Attending: OBSTETRICS & GYNECOLOGY
Payer: COMMERCIAL

## 2020-02-19 DIAGNOSIS — Z78.0 MENOPAUSE: ICD-10-CM

## 2020-02-19 PROCEDURE — 77080 DXA BONE DENSITY AXIAL: CPT | Mod: 26,,, | Performed by: RADIOLOGY

## 2020-02-19 PROCEDURE — 77080 DXA BONE DENSITY AXIAL: CPT | Mod: TC

## 2020-02-19 PROCEDURE — 77080 DEXA BONE DENSITY SPINE HIP: ICD-10-PCS | Mod: 26,,, | Performed by: RADIOLOGY

## 2020-02-21 LAB
FINAL PATHOLOGIC DIAGNOSIS: NORMAL
Lab: NORMAL

## 2020-02-27 ENCOUNTER — TELEPHONE (OUTPATIENT)
Dept: OBSTETRICS AND GYNECOLOGY | Facility: CLINIC | Age: 60
End: 2020-02-27

## 2020-05-19 DIAGNOSIS — L28.0 LICHEN SIMPLEX: ICD-10-CM

## 2020-05-19 RX ORDER — FLUOCINONIDE 0.5 MG/G
CREAM TOPICAL 2 TIMES DAILY
Qty: 30 G | Refills: 1 | Status: SHIPPED | OUTPATIENT
Start: 2020-05-19 | End: 2022-10-31 | Stop reason: SDUPTHER

## 2020-10-13 ENCOUNTER — TELEPHONE (OUTPATIENT)
Dept: HEMATOLOGY/ONCOLOGY | Facility: CLINIC | Age: 60
End: 2020-10-13

## 2020-10-13 DIAGNOSIS — E03.9 ACQUIRED HYPOTHYROIDISM: Primary | ICD-10-CM

## 2020-10-13 DIAGNOSIS — Z85.3 HISTORY OF BREAST CANCER: ICD-10-CM

## 2020-10-13 NOTE — TELEPHONE ENCOUNTER
"----- Message from Lisbeth Haro sent at 10/13/2020 12:59 PM CDT -----  Patient Assist    Name of caller:  Lisa   Contact Preference:  [phone]  Does Patient feel the need to see the MD today? No   Physician:  Joshua LIGHT MD   What is the nature of the call?    - called to schedule next annual f/u and lab for Nov.     Additional Notes:   "Thank you for all that you do for our patients'"          "

## 2020-10-15 ENCOUNTER — TELEPHONE (OUTPATIENT)
Dept: HEMATOLOGY/ONCOLOGY | Facility: CLINIC | Age: 60
End: 2020-10-15

## 2020-10-15 DIAGNOSIS — Z85.3 HISTORY OF BREAST CANCER: Primary | ICD-10-CM

## 2020-10-15 NOTE — TELEPHONE ENCOUNTER
"----- Message from Lisbeth Haro sent at 10/15/2020 12:37 PM CDT -----  Patient Assist    Name of caller:  Lisa   Contact Preference:  [phone]  Does Patient feel the need to see the MD today? No   Physician:  Joshua LIGHT MD  What is the nature of the call?    - called to schedule her next f/u appt and mammo Please call and assist    Additional Notes:   "Thank you for all that you do for our patients'"          " wears reading glasses/Partially impaired: cannot see medication labels or newsprint, but can see obstacles in path, and the surrounding layout; can count fingers at arm's length

## 2020-10-27 NOTE — PROGRESS NOTES
Subjective:       Patient ID: Mayra Crawford is a 60 y.o. female.    Chief Complaint: No chief complaint on file.    HPI Ms. Crawford returned to clinic for followup of remote history of carcinoma of  the left breast.    Overall she is doing well. She has lost 20 lbs while dieting.  She reports some left breast pain for the last 2 weeks.    BMD in February showed osteopenia    Breast history diagnosed in 1992 treated with lumpectomy and adjuvant       chemotherapy with 5-FU and methotrexate on protocol NSABP B-20.  She then    took 5 years of tamoxifen and completed in 1997.          Review of Systems   Constitutional: Negative for activity change, appetite change and unexpected weight change.   Respiratory: Negative for cough, shortness of breath and wheezing.    Cardiovascular: Negative for chest pain.   Gastrointestinal: Positive for constipation. Negative for abdominal distention, diarrhea, nausea and vomiting.   Musculoskeletal: Negative for back pain and neck pain.   Skin: Negative for rash.   Psychiatric/Behavioral: Negative for dysphoric mood. The patient is not nervous/anxious.        Objective:      Physical Exam  Vitals signs reviewed.   Constitutional:       Appearance: She is well-developed.   HENT:      Mouth/Throat:      Pharynx: No oropharyngeal exudate.   Eyes:      General: No scleral icterus.  Cardiovascular:      Rate and Rhythm: Normal rate and regular rhythm.   Pulmonary:      Effort: Pulmonary effort is normal.      Breath sounds: Normal breath sounds. No wheezing or rales.   Chest:      Breasts:         Right: No mass, nipple discharge or skin change.         Left: No mass, nipple discharge or skin change.       Abdominal:      Palpations: Abdomen is soft. There is no mass.      Tenderness: There is no abdominal tenderness.   Lymphadenopathy:      Cervical: No cervical adenopathy.      Upper Body:      Right upper body: No supraclavicular adenopathy.      Left upper body: No supraclavicular  adenopathy.   Psychiatric:         Behavior: Behavior normal.         Thought Content: Thought content normal.         Assessment:     mammogram - negative  1. History of breast cancer    2. Acquired hypothyroidism        Plan:         Return in 1 year with mammograms.  Start Vit D and calcium

## 2020-10-28 ENCOUNTER — OFFICE VISIT (OUTPATIENT)
Dept: HEMATOLOGY/ONCOLOGY | Facility: CLINIC | Age: 60
End: 2020-10-28
Payer: COMMERCIAL

## 2020-10-28 ENCOUNTER — HOSPITAL ENCOUNTER (OUTPATIENT)
Dept: RADIOLOGY | Facility: HOSPITAL | Age: 60
Discharge: HOME | End: 2020-10-28
Attending: INTERNAL MEDICINE
Payer: COMMERCIAL

## 2020-10-28 VITALS
DIASTOLIC BLOOD PRESSURE: 78 MMHG | TEMPERATURE: 98 F | HEART RATE: 63 BPM | RESPIRATION RATE: 16 BRPM | WEIGHT: 167.13 LBS | HEIGHT: 65 IN | BODY MASS INDEX: 27.85 KG/M2 | OXYGEN SATURATION: 97 % | SYSTOLIC BLOOD PRESSURE: 119 MMHG

## 2020-10-28 DIAGNOSIS — E03.9 ACQUIRED HYPOTHYROIDISM: ICD-10-CM

## 2020-10-28 DIAGNOSIS — Z85.3 HISTORY OF BREAST CANCER: ICD-10-CM

## 2020-10-28 DIAGNOSIS — Z85.3 HISTORY OF BREAST CANCER: Primary | ICD-10-CM

## 2020-10-28 PROCEDURE — 77066 DX MAMMO INCL CAD BI: CPT | Mod: 26,,, | Performed by: RADIOLOGY

## 2020-10-28 PROCEDURE — 77066 DX MAMMO INCL CAD BI: CPT | Mod: TC

## 2020-10-28 PROCEDURE — 3008F PR BODY MASS INDEX (BMI) DOCUMENTED: ICD-10-PCS | Mod: CPTII,S$GLB,, | Performed by: INTERNAL MEDICINE

## 2020-10-28 PROCEDURE — 99213 PR OFFICE/OUTPT VISIT, EST, LEVL III, 20-29 MIN: ICD-10-PCS | Mod: S$GLB,,, | Performed by: INTERNAL MEDICINE

## 2020-10-28 PROCEDURE — 99999 PR PBB SHADOW E&M-EST. PATIENT-LVL IV: ICD-10-PCS | Mod: PBBFAC,,, | Performed by: INTERNAL MEDICINE

## 2020-10-28 PROCEDURE — 3078F DIAST BP <80 MM HG: CPT | Mod: CPTII,S$GLB,, | Performed by: INTERNAL MEDICINE

## 2020-10-28 PROCEDURE — 3078F PR MOST RECENT DIASTOLIC BLOOD PRESSURE < 80 MM HG: ICD-10-PCS | Mod: CPTII,S$GLB,, | Performed by: INTERNAL MEDICINE

## 2020-10-28 PROCEDURE — 3074F PR MOST RECENT SYSTOLIC BLOOD PRESSURE < 130 MM HG: ICD-10-PCS | Mod: CPTII,S$GLB,, | Performed by: INTERNAL MEDICINE

## 2020-10-28 PROCEDURE — 77062 MAMMO DIGITAL DIAGNOSTIC BILAT WITH TOMO: ICD-10-PCS | Mod: 26,,, | Performed by: RADIOLOGY

## 2020-10-28 PROCEDURE — 99213 OFFICE O/P EST LOW 20 MIN: CPT | Mod: S$GLB,,, | Performed by: INTERNAL MEDICINE

## 2020-10-28 PROCEDURE — 77066 MAMMO DIGITAL DIAGNOSTIC BILAT WITH TOMO: ICD-10-PCS | Mod: 26,,, | Performed by: RADIOLOGY

## 2020-10-28 PROCEDURE — 3008F BODY MASS INDEX DOCD: CPT | Mod: CPTII,S$GLB,, | Performed by: INTERNAL MEDICINE

## 2020-10-28 PROCEDURE — 3074F SYST BP LT 130 MM HG: CPT | Mod: CPTII,S$GLB,, | Performed by: INTERNAL MEDICINE

## 2020-10-28 PROCEDURE — 99999 PR PBB SHADOW E&M-EST. PATIENT-LVL IV: CPT | Mod: PBBFAC,,, | Performed by: INTERNAL MEDICINE

## 2020-10-28 PROCEDURE — 77062 BREAST TOMOSYNTHESIS BI: CPT | Mod: 26,,, | Performed by: RADIOLOGY

## 2021-07-12 DIAGNOSIS — F32.89 OTHER DEPRESSION: ICD-10-CM

## 2021-08-04 ENCOUNTER — TELEPHONE (OUTPATIENT)
Dept: HEMATOLOGY/ONCOLOGY | Facility: CLINIC | Age: 61
End: 2021-08-04

## 2021-09-23 ENCOUNTER — TELEPHONE (OUTPATIENT)
Dept: HEMATOLOGY/ONCOLOGY | Facility: CLINIC | Age: 61
End: 2021-09-23

## 2021-09-23 DIAGNOSIS — Z85.3 HISTORY OF BREAST CANCER: Primary | ICD-10-CM

## 2021-09-23 DIAGNOSIS — E03.9 ACQUIRED HYPOTHYROIDISM: ICD-10-CM

## 2021-10-25 ENCOUNTER — OFFICE VISIT (OUTPATIENT)
Dept: HEMATOLOGY/ONCOLOGY | Facility: CLINIC | Age: 61
End: 2021-10-25
Payer: COMMERCIAL

## 2021-10-25 ENCOUNTER — HOSPITAL ENCOUNTER (OUTPATIENT)
Dept: RADIOLOGY | Facility: HOSPITAL | Age: 61
Discharge: HOME | End: 2021-10-25
Attending: INTERNAL MEDICINE
Payer: COMMERCIAL

## 2021-10-25 VITALS
TEMPERATURE: 98 F | HEIGHT: 65 IN | DIASTOLIC BLOOD PRESSURE: 66 MMHG | RESPIRATION RATE: 18 BRPM | HEART RATE: 61 BPM | WEIGHT: 169.56 LBS | BODY MASS INDEX: 28.25 KG/M2 | SYSTOLIC BLOOD PRESSURE: 139 MMHG | OXYGEN SATURATION: 100 %

## 2021-10-25 VITALS — WEIGHT: 160 LBS | BODY MASS INDEX: 26.63 KG/M2

## 2021-10-25 DIAGNOSIS — Z85.3 HISTORY OF BREAST CANCER: ICD-10-CM

## 2021-10-25 DIAGNOSIS — E03.9 ACQUIRED HYPOTHYROIDISM: ICD-10-CM

## 2021-10-25 DIAGNOSIS — Z85.3 HISTORY OF BREAST CANCER: Primary | ICD-10-CM

## 2021-10-25 PROCEDURE — 3078F DIAST BP <80 MM HG: CPT | Mod: CPTII,S$GLB,, | Performed by: INTERNAL MEDICINE

## 2021-10-25 PROCEDURE — 77067 SCR MAMMO BI INCL CAD: CPT | Mod: TC

## 2021-10-25 PROCEDURE — 3008F PR BODY MASS INDEX (BMI) DOCUMENTED: ICD-10-PCS | Mod: CPTII,S$GLB,, | Performed by: INTERNAL MEDICINE

## 2021-10-25 PROCEDURE — 99213 OFFICE O/P EST LOW 20 MIN: CPT | Mod: S$GLB,,, | Performed by: INTERNAL MEDICINE

## 2021-10-25 PROCEDURE — 99999 PR PBB SHADOW E&M-EST. PATIENT-LVL IV: CPT | Mod: PBBFAC,,, | Performed by: INTERNAL MEDICINE

## 2021-10-25 PROCEDURE — 1159F PR MEDICATION LIST DOCUMENTED IN MEDICAL RECORD: ICD-10-PCS | Mod: CPTII,S$GLB,, | Performed by: INTERNAL MEDICINE

## 2021-10-25 PROCEDURE — 1159F MED LIST DOCD IN RCRD: CPT | Mod: CPTII,S$GLB,, | Performed by: INTERNAL MEDICINE

## 2021-10-25 PROCEDURE — 77067 MAMMO DIGITAL SCREENING BILAT WITH TOMO: ICD-10-PCS | Mod: 26,,, | Performed by: RADIOLOGY

## 2021-10-25 PROCEDURE — 99213 PR OFFICE/OUTPT VISIT, EST, LEVL III, 20-29 MIN: ICD-10-PCS | Mod: S$GLB,,, | Performed by: INTERNAL MEDICINE

## 2021-10-25 PROCEDURE — 3075F SYST BP GE 130 - 139MM HG: CPT | Mod: CPTII,S$GLB,, | Performed by: INTERNAL MEDICINE

## 2021-10-25 PROCEDURE — 77067 SCR MAMMO BI INCL CAD: CPT | Mod: 26,,, | Performed by: RADIOLOGY

## 2021-10-25 PROCEDURE — 77063 BREAST TOMOSYNTHESIS BI: CPT | Mod: 26,,, | Performed by: RADIOLOGY

## 2021-10-25 PROCEDURE — 3078F PR MOST RECENT DIASTOLIC BLOOD PRESSURE < 80 MM HG: ICD-10-PCS | Mod: CPTII,S$GLB,, | Performed by: INTERNAL MEDICINE

## 2021-10-25 PROCEDURE — 77063 MAMMO DIGITAL SCREENING BILAT WITH TOMO: ICD-10-PCS | Mod: 26,,, | Performed by: RADIOLOGY

## 2021-10-25 PROCEDURE — 99999 PR PBB SHADOW E&M-EST. PATIENT-LVL IV: ICD-10-PCS | Mod: PBBFAC,,, | Performed by: INTERNAL MEDICINE

## 2021-10-25 PROCEDURE — 3075F PR MOST RECENT SYSTOLIC BLOOD PRESS GE 130-139MM HG: ICD-10-PCS | Mod: CPTII,S$GLB,, | Performed by: INTERNAL MEDICINE

## 2021-10-25 PROCEDURE — 3008F BODY MASS INDEX DOCD: CPT | Mod: CPTII,S$GLB,, | Performed by: INTERNAL MEDICINE

## 2022-07-25 ENCOUNTER — TELEPHONE (OUTPATIENT)
Dept: HEMATOLOGY/ONCOLOGY | Facility: CLINIC | Age: 62
End: 2022-07-25
Payer: COMMERCIAL

## 2022-07-25 NOTE — TELEPHONE ENCOUNTER
"----- Message from Delfin Lewis MD sent at 7/25/2022 11:38 AM CDT -----  Regarding: RE: Consult/Advisory:  If they have a PCP yes  ----- Message -----  From: Lana Nash RN  Sent: 7/25/2022  11:12 AM CDT  To: Delfin Lewis MD  Subject: FW: Consult/Advisory:                            Usually I send them to their pcp for that. Is that your policy too  ----- Message -----  From: Lana Nash RN  Sent: 7/25/2022  11:12 AM CDT  To: Joshua SANTIAGO Staff  Subject: FW: Consult/Advisory:                            Usually I send them to their pcp for that. Is that your policy too?  lana  ----- Message -----  From: Domenica Landry  Sent: 7/25/2022   8:32 AM CDT  To: Joshua SANTIAGO Staff  Subject: Consult/Advisory:                                           Name Of Caller:   Mayra    Contact Preference?:    [phone]         What is the nature of the call?:  Pt tested positive Friday for Covid, and wanted to know if the doctor could prescribe her Paxlovid           Additional Notes:  "Thank you for all that you do for our patients'"           "

## 2022-09-08 ENCOUNTER — TELEPHONE (OUTPATIENT)
Dept: HEMATOLOGY/ONCOLOGY | Facility: CLINIC | Age: 62
End: 2022-09-08
Payer: COMMERCIAL

## 2022-09-21 DIAGNOSIS — Z85.3 HISTORY OF BREAST CANCER: Primary | ICD-10-CM

## 2022-10-05 ENCOUNTER — TELEPHONE (OUTPATIENT)
Dept: PRIMARY CARE CLINIC | Facility: CLINIC | Age: 62
End: 2022-10-05

## 2022-10-05 ENCOUNTER — OFFICE VISIT (OUTPATIENT)
Dept: PRIMARY CARE CLINIC | Facility: CLINIC | Age: 62
End: 2022-10-05
Payer: COMMERCIAL

## 2022-10-05 ENCOUNTER — LAB VISIT (OUTPATIENT)
Dept: LAB | Facility: HOSPITAL | Age: 62
End: 2022-10-05
Attending: INTERNAL MEDICINE
Payer: COMMERCIAL

## 2022-10-05 VITALS
TEMPERATURE: 98 F | BODY MASS INDEX: 28.72 KG/M2 | DIASTOLIC BLOOD PRESSURE: 82 MMHG | SYSTOLIC BLOOD PRESSURE: 120 MMHG | OXYGEN SATURATION: 97 % | HEIGHT: 65 IN | WEIGHT: 172.38 LBS | HEART RATE: 72 BPM

## 2022-10-05 DIAGNOSIS — R50.9 FEVER, UNSPECIFIED FEVER CAUSE: ICD-10-CM

## 2022-10-05 DIAGNOSIS — L03.90 CELLULITIS, UNSPECIFIED CELLULITIS SITE: ICD-10-CM

## 2022-10-05 DIAGNOSIS — L03.90 CELLULITIS, UNSPECIFIED CELLULITIS SITE: Primary | ICD-10-CM

## 2022-10-05 LAB
ALBUMIN SERPL BCP-MCNC: 3.5 G/DL (ref 3.5–5.2)
ALP SERPL-CCNC: 81 U/L (ref 55–135)
ALT SERPL W/O P-5'-P-CCNC: 59 U/L (ref 10–44)
ANION GAP SERPL CALC-SCNC: 10 MMOL/L (ref 8–16)
AST SERPL-CCNC: 43 U/L (ref 10–40)
BASOPHILS # BLD AUTO: 0.03 K/UL (ref 0–0.2)
BASOPHILS NFR BLD: 0.5 % (ref 0–1.9)
BILIRUB SERPL-MCNC: 0.3 MG/DL (ref 0.1–1)
BILIRUB UR QL STRIP: NEGATIVE
BUN SERPL-MCNC: 14 MG/DL (ref 8–23)
CALCIUM SERPL-MCNC: 9.3 MG/DL (ref 8.7–10.5)
CHLORIDE SERPL-SCNC: 107 MMOL/L (ref 95–110)
CLARITY UR REFRACT.AUTO: CLEAR
CO2 SERPL-SCNC: 24 MMOL/L (ref 23–29)
COLOR UR AUTO: YELLOW
CREAT SERPL-MCNC: 0.8 MG/DL (ref 0.5–1.4)
CRP SERPL-MCNC: 63.1 MG/L (ref 0–8.2)
DIFFERENTIAL METHOD: ABNORMAL
EOSINOPHIL # BLD AUTO: 0 K/UL (ref 0–0.5)
EOSINOPHIL NFR BLD: 0.3 % (ref 0–8)
ERYTHROCYTE [DISTWIDTH] IN BLOOD BY AUTOMATED COUNT: 14.6 % (ref 11.5–14.5)
ERYTHROCYTE [SEDIMENTATION RATE] IN BLOOD BY PHOTOMETRIC METHOD: 36 MM/HR (ref 0–36)
EST. GFR  (NO RACE VARIABLE): >60 ML/MIN/1.73 M^2
GLUCOSE SERPL-MCNC: 97 MG/DL (ref 70–110)
GLUCOSE UR QL STRIP: NEGATIVE
HCT VFR BLD AUTO: 39.4 % (ref 37–48.5)
HGB BLD-MCNC: 12.6 G/DL (ref 12–16)
HGB UR QL STRIP: NEGATIVE
IMM GRANULOCYTES # BLD AUTO: 0.01 K/UL (ref 0–0.04)
IMM GRANULOCYTES NFR BLD AUTO: 0.2 % (ref 0–0.5)
KETONES UR QL STRIP: NEGATIVE
LEUKOCYTE ESTERASE UR QL STRIP: NEGATIVE
LYMPHOCYTES # BLD AUTO: 2.4 K/UL (ref 1–4.8)
LYMPHOCYTES NFR BLD: 41.2 % (ref 18–48)
MCH RBC QN AUTO: 30.2 PG (ref 27–31)
MCHC RBC AUTO-ENTMCNC: 32 G/DL (ref 32–36)
MCV RBC AUTO: 95 FL (ref 82–98)
MONOCYTES # BLD AUTO: 0.6 K/UL (ref 0.3–1)
MONOCYTES NFR BLD: 10.1 % (ref 4–15)
NEUTROPHILS # BLD AUTO: 2.7 K/UL (ref 1.8–7.7)
NEUTROPHILS NFR BLD: 47.7 % (ref 38–73)
NITRITE UR QL STRIP: NEGATIVE
NRBC BLD-RTO: 0 /100 WBC
PH UR STRIP: 6 [PH] (ref 5–8)
PLATELET # BLD AUTO: 180 K/UL (ref 150–450)
PMV BLD AUTO: 10.8 FL (ref 9.2–12.9)
POTASSIUM SERPL-SCNC: 4.5 MMOL/L (ref 3.5–5.1)
PROT SERPL-MCNC: 7.8 G/DL (ref 6–8.4)
PROT UR QL STRIP: ABNORMAL
RBC # BLD AUTO: 4.17 M/UL (ref 4–5.4)
SODIUM SERPL-SCNC: 141 MMOL/L (ref 136–145)
SP GR UR STRIP: 1.02 (ref 1–1.03)
URN SPEC COLLECT METH UR: ABNORMAL
WBC # BLD AUTO: 5.75 K/UL (ref 3.9–12.7)

## 2022-10-05 PROCEDURE — 3079F DIAST BP 80-89 MM HG: CPT | Mod: CPTII,S$GLB,, | Performed by: INTERNAL MEDICINE

## 2022-10-05 PROCEDURE — 3079F PR MOST RECENT DIASTOLIC BLOOD PRESSURE 80-89 MM HG: ICD-10-PCS | Mod: CPTII,S$GLB,, | Performed by: INTERNAL MEDICINE

## 2022-10-05 PROCEDURE — 1159F MED LIST DOCD IN RCRD: CPT | Mod: CPTII,S$GLB,, | Performed by: INTERNAL MEDICINE

## 2022-10-05 PROCEDURE — 1160F PR REVIEW ALL MEDS BY PRESCRIBER/CLIN PHARMACIST DOCUMENTED: ICD-10-PCS | Mod: CPTII,S$GLB,, | Performed by: INTERNAL MEDICINE

## 2022-10-05 PROCEDURE — 3074F PR MOST RECENT SYSTOLIC BLOOD PRESSURE < 130 MM HG: ICD-10-PCS | Mod: CPTII,S$GLB,, | Performed by: INTERNAL MEDICINE

## 2022-10-05 PROCEDURE — 85652 RBC SED RATE AUTOMATED: CPT | Performed by: INTERNAL MEDICINE

## 2022-10-05 PROCEDURE — 80053 COMPREHEN METABOLIC PANEL: CPT | Performed by: INTERNAL MEDICINE

## 2022-10-05 PROCEDURE — 36415 COLL VENOUS BLD VENIPUNCTURE: CPT | Performed by: INTERNAL MEDICINE

## 2022-10-05 PROCEDURE — 1159F PR MEDICATION LIST DOCUMENTED IN MEDICAL RECORD: ICD-10-PCS | Mod: CPTII,S$GLB,, | Performed by: INTERNAL MEDICINE

## 2022-10-05 PROCEDURE — 3008F BODY MASS INDEX DOCD: CPT | Mod: CPTII,S$GLB,, | Performed by: INTERNAL MEDICINE

## 2022-10-05 PROCEDURE — 86140 C-REACTIVE PROTEIN: CPT | Performed by: INTERNAL MEDICINE

## 2022-10-05 PROCEDURE — 99999 PR PBB SHADOW E&M-EST. PATIENT-LVL IV: ICD-10-PCS | Mod: PBBFAC,,, | Performed by: INTERNAL MEDICINE

## 2022-10-05 PROCEDURE — 1160F RVW MEDS BY RX/DR IN RCRD: CPT | Mod: CPTII,S$GLB,, | Performed by: INTERNAL MEDICINE

## 2022-10-05 PROCEDURE — 81003 URINALYSIS AUTO W/O SCOPE: CPT | Performed by: INTERNAL MEDICINE

## 2022-10-05 PROCEDURE — 85025 COMPLETE CBC W/AUTO DIFF WBC: CPT | Performed by: INTERNAL MEDICINE

## 2022-10-05 PROCEDURE — 99999 PR PBB SHADOW E&M-EST. PATIENT-LVL IV: CPT | Mod: PBBFAC,,, | Performed by: INTERNAL MEDICINE

## 2022-10-05 PROCEDURE — 3074F SYST BP LT 130 MM HG: CPT | Mod: CPTII,S$GLB,, | Performed by: INTERNAL MEDICINE

## 2022-10-05 PROCEDURE — 99214 OFFICE O/P EST MOD 30 MIN: CPT | Mod: S$GLB,,, | Performed by: INTERNAL MEDICINE

## 2022-10-05 PROCEDURE — 3008F PR BODY MASS INDEX (BMI) DOCUMENTED: ICD-10-PCS | Mod: CPTII,S$GLB,, | Performed by: INTERNAL MEDICINE

## 2022-10-05 PROCEDURE — 99214 PR OFFICE/OUTPT VISIT, EST, LEVL IV, 30-39 MIN: ICD-10-PCS | Mod: S$GLB,,, | Performed by: INTERNAL MEDICINE

## 2022-10-05 RX ORDER — DOXYCYCLINE 100 MG/1
100 CAPSULE ORAL 2 TIMES DAILY
Qty: 20 CAPSULE | Refills: 0 | Status: SHIPPED | OUTPATIENT
Start: 2022-10-05 | End: 2022-10-15

## 2022-10-05 NOTE — PROGRESS NOTES
62-year-old woman with a history of allergic rhinitis, history of asthma which is intermittent and on albuterol p.r.n., history of nephrolithiasis, hypothyroidism, anxiety with depression, here with a chief complaint of a lesion on her right back associated with fever and adenopathy of her right axilla.  The patient also has a history of breast cancer on the left.  History of present illness and pertinent review of systems:  About 2 weeks ago the patient noticed a sore on her right back at the posterior axillary line.  It was a single lesion and it began to swell and became tender.  She also noted swelling in her right axilla.  Her daughter was a nurse came over evaluated this and thought she should be seen by a physician.  She was seen on 09/28 on a virtual visit and given Bactrim double strength.  He did not relieve the issue.  On 10/01 the patient went to urgent care and the Bactrim was stopped and she was placed on clindamycin.  She was also given a shot of steroids at that time.  The patient notes that on the day she was seen in urgent care she had a spot additionally on her pharynx and also sore throat.  They did do a urinalysis and a strep test which the patient reports were negative with the exception for some trace blood in her urine.  Last evening the patient continued to feel poorly his stated she had a fever to 102.5 and decided she which to be examined and seen again.  She presented to the office.    Remaining review of systems is negative.      Physical Exam  Constitutional:       General: She is not in acute distress.     Appearance: She is obese. She is ill-appearing. She is not toxic-appearing or diaphoretic.   HENT:      Head: Normocephalic.      Right Ear: Tympanic membrane, ear canal and external ear normal.      Left Ear: Tympanic membrane, ear canal and external ear normal.      Nose: Nose normal. No congestion.      Mouth/Throat:      Mouth: Mucous membranes are moist.      Pharynx: Oropharynx  is clear. No oropharyngeal exudate or posterior oropharyngeal erythema.   Eyes:      General: No scleral icterus.     Extraocular Movements: Extraocular movements intact.      Conjunctiva/sclera: Conjunctivae normal.      Pupils: Pupils are equal, round, and reactive to light.      Comments: There was no conjunctiva hemorrhages or alicia spots seen.  The patient did have small cataracts.   Cardiovascular:      Rate and Rhythm: Normal rate and regular rhythm.      Pulses: Normal pulses.      Heart sounds: Normal heart sounds. No murmur heard.  Pulmonary:      Effort: Pulmonary effort is normal. No respiratory distress.      Breath sounds: Normal breath sounds. No wheezing, rhonchi or rales.   Abdominal:      General: Bowel sounds are normal. There is no distension.      Palpations: Abdomen is soft. There is no mass.      Tenderness: There is no abdominal tenderness. There is no right CVA tenderness or left CVA tenderness.      Comments: No hepatosplenomegaly   Musculoskeletal:         General: No swelling, tenderness, deformity or signs of injury.      Cervical back: Normal range of motion and neck supple. No rigidity or tenderness.   Lymphadenopathy:      Cervical: No cervical adenopathy.   Skin:     General: Skin is warm.      Coloration: Skin is not jaundiced or pale.      Findings: No bruising, erythema or rash.      Comments: There is scabbing 0.5 cm lesion in the posterior axillary line at approximately the 9th rib.  There is surrounding cellulitis.  No other lesions were seen including Osler nodes and splinter hemorrhages.  There is no lesions similar to what was described in the pharynx or on the roof of the mouth.   Neurological:      General: No focal deficit present.      Mental Status: She is alert and oriented to person, place, and time.   Psychiatric:         Mood and Affect: Mood normal.      Assessment/plan  1. Cellulitis with scabbing lesion:  The patient's cellulitis is minimal and the lesion appears  to be healing.  There is no axillary adenopathy as described previously.  The patient however does look ill and did have a fever to 102.5 last evening.  The symptoms do appear prolonged in the etiology of this lesion is uncertain.  The patient could have had a spider bite.  She has had a recent negative strep test and a urine culture which is pending.  Patient has been on 2 appropriate antibiotics Bactrim initially then clindamycin with essential resolution.  She also was placed on mupirocin.  Plan:  The patient will continue clindamycin and doxycycline will be added   Moist warm heat at least 3 times a day followed by the mupirocin.    2. Fever:  A fever of 102.5 would be unusual given the fact the patient has been on 2 antibiotics.  The history of malaise and prolonged symptoms make 1 concerned about intermittent bacteremia and possibly endocarditis.  The patient was examined for this and there was no hepatosplenomegaly, fine in the patient's eye grounds.  Osler nodes, splinter hemorrhages or other adenopathy.  Plan:  The patient will have a CBC, CMP, sed rate, C-reactive protein, and urinalysis.  If the patient again has a fever in the evening to that degree she should go to the emergency room at [facility] where this note can be reviewed.  Blood cultures and possible echocardiogram may need to be done.

## 2022-10-05 NOTE — PATIENT INSTRUCTIONS
Apply moist heat 2 to 3 times a day after which apply mupirocin.   your medication at the pharmacy.    If your temperature is above 102 return to the ER.

## 2022-10-06 ENCOUNTER — TELEPHONE (OUTPATIENT)
Dept: PRIMARY CARE CLINIC | Facility: CLINIC | Age: 62
End: 2022-10-06
Payer: COMMERCIAL

## 2022-10-06 NOTE — TELEPHONE ENCOUNTER
Called patient to see if she had gotten my message regarding her labs and also to see if there been any improvement since starting doxycycline yesterday.  She had a temperature to 100 but was generally beginning to feel better.  I asked the patient to contact me if sure no better on Monday.  I also reminded her that if she has a temperature spike to 102.5 or greater she must go to the ER.

## 2022-10-06 NOTE — TELEPHONE ENCOUNTER
I called the patient but she did not  her phone.  I did leave a message.  CBC was fine and the CMP did demonstrate mild elevations of her ALT and AST which is nonspecific.  The sed rate is at the upper limits of normal however the CRP is significantly elevated.  The clinical course suggests something more than just a simple cellulitis and the CRP does create a worry about the possibility of bacteremia associated with this.  I will call the patient in the morning and check on her condition.  She has been told to go to the ER if her temperature again goes above 102.  At that time is my hope blood cultures will be done as well as placing the patient on IV antibiotics.

## 2022-10-27 NOTE — PROGRESS NOTES
Subjective:       Patient ID: Mayra Crawford is a 62 y.o. female.    Chief Complaint: No chief complaint on file.    HPI Ms. Crawford returned to clinic for followup of a remote history of carcinoma of the left breast.    She had a right axillary infection in Sept-Oct. Took Bactrim then clinda + doxy.  That has now resolved.    She would like to try something different for her anxiety instead of the Zoloft which she has been on.    She has some occasional pain in her left inframammary area along her ribs.  Her breathing has been good.    She continues to have constipation and is taking MiraLax twice a week and andie Colace daily.    She had COVID in June while on vacation in [place].  She needs a new primary care and a gynecological appointment.      Breast history :  Breast Cancer Stage 1 ER+ diagnosed in 1992 treated with lumpectomy and adjuvant       chemotherapy with 5-FU and methotrexate on protocol NSABP B-20.    She then took 5 years of tamoxifen and completed in 1997.    BMD in February 2020 showed osteopenia      Review of Systems   Constitutional:  Negative for activity change, appetite change and unexpected weight change.   Respiratory:  Negative for cough, shortness of breath and wheezing.    Cardiovascular:  Negative for chest pain.   Gastrointestinal:  Positive for constipation. Negative for abdominal distention, diarrhea, nausea and vomiting.   Musculoskeletal:  Negative for back pain and neck pain.   Skin:  Negative for rash.   Psychiatric/Behavioral:  Negative for dysphoric mood. The patient is not nervous/anxious.      Objective:      Physical Exam  Vitals reviewed.   Constitutional:       Appearance: She is well-developed.   HENT:      Mouth/Throat:      Pharynx: No oropharyngeal exudate.   Eyes:      General: No scleral icterus.  Cardiovascular:      Rate and Rhythm: Normal rate and regular rhythm.   Pulmonary:      Effort: Pulmonary effort is normal.      Breath sounds: Normal breath sounds. No  wheezing or rales.   Chest:   Breasts:     Right: No mass, nipple discharge or skin change.      Left: No mass, nipple discharge or skin change.       Abdominal:      Palpations: Abdomen is soft. There is no mass.      Tenderness: There is no abdominal tenderness.   Lymphadenopathy:      Cervical: No cervical adenopathy.      Upper Body:      Right upper body: No supraclavicular adenopathy.      Left upper body: No supraclavicular adenopathy.   Neurological:      Mental Status: She is oriented to person, place, and time.   Psychiatric:         Mood and Affect: Mood normal.         Behavior: Behavior normal.         Thought Content: Thought content normal.         Judgment: Judgment normal.       Assessment:     mammogram - pending    CMP - normal, CBC - Normal, TSH normal, Chol 209  1. History of breast cancer        Plan:         Return in 1 year with mammograms and labs.  GYN referral      Route Chart for Scheduling    Med Onc Chart Routing      Follow up with physician 1 year. wants Wed PM for her GYN appt   Follow up with MARYANN    Infusion scheduling note    Injection scheduling note    Labs CBC, CMP, TSH and other   Lab interval:     Imaging    Pharmacy appointment    Other referrals Additional referrals needed

## 2022-10-31 ENCOUNTER — OFFICE VISIT (OUTPATIENT)
Dept: HEMATOLOGY/ONCOLOGY | Facility: CLINIC | Age: 62
End: 2022-10-31
Payer: COMMERCIAL

## 2022-10-31 ENCOUNTER — HOSPITAL ENCOUNTER (OUTPATIENT)
Dept: RADIOLOGY | Facility: HOSPITAL | Age: 62
Discharge: HOME | End: 2022-10-31
Attending: INTERNAL MEDICINE
Payer: COMMERCIAL

## 2022-10-31 VITALS
OXYGEN SATURATION: 100 % | WEIGHT: 172.63 LBS | DIASTOLIC BLOOD PRESSURE: 79 MMHG | BODY MASS INDEX: 28.76 KG/M2 | HEART RATE: 54 BPM | HEIGHT: 65 IN | SYSTOLIC BLOOD PRESSURE: 140 MMHG | TEMPERATURE: 98 F | RESPIRATION RATE: 18 BRPM

## 2022-10-31 DIAGNOSIS — Z85.3 HISTORY OF BREAST CANCER: Primary | ICD-10-CM

## 2022-10-31 DIAGNOSIS — F41.9 ANXIETY: ICD-10-CM

## 2022-10-31 DIAGNOSIS — Z85.3 HISTORY OF BREAST CANCER: ICD-10-CM

## 2022-10-31 DIAGNOSIS — L28.0 LICHEN SIMPLEX: ICD-10-CM

## 2022-10-31 DIAGNOSIS — E03.9 ACQUIRED HYPOTHYROIDISM: ICD-10-CM

## 2022-10-31 PROCEDURE — 3077F SYST BP >= 140 MM HG: CPT | Mod: CPTII,S$GLB,, | Performed by: INTERNAL MEDICINE

## 2022-10-31 PROCEDURE — 3077F PR MOST RECENT SYSTOLIC BLOOD PRESSURE >= 140 MM HG: ICD-10-PCS | Mod: CPTII,S$GLB,, | Performed by: INTERNAL MEDICINE

## 2022-10-31 PROCEDURE — 3078F PR MOST RECENT DIASTOLIC BLOOD PRESSURE < 80 MM HG: ICD-10-PCS | Mod: CPTII,S$GLB,, | Performed by: INTERNAL MEDICINE

## 2022-10-31 PROCEDURE — 99999 PR PBB SHADOW E&M-EST. PATIENT-LVL V: ICD-10-PCS | Mod: PBBFAC,,, | Performed by: INTERNAL MEDICINE

## 2022-10-31 PROCEDURE — 77063 BREAST TOMOSYNTHESIS BI: CPT | Mod: TC

## 2022-10-31 PROCEDURE — 77067 MAMMO DIGITAL SCREENING BILAT WITH TOMO: ICD-10-PCS | Mod: 26,,, | Performed by: RADIOLOGY

## 2022-10-31 PROCEDURE — 99214 OFFICE O/P EST MOD 30 MIN: CPT | Mod: S$GLB,,, | Performed by: INTERNAL MEDICINE

## 2022-10-31 PROCEDURE — 77067 SCR MAMMO BI INCL CAD: CPT | Mod: 26,,, | Performed by: RADIOLOGY

## 2022-10-31 PROCEDURE — 1159F MED LIST DOCD IN RCRD: CPT | Mod: CPTII,S$GLB,, | Performed by: INTERNAL MEDICINE

## 2022-10-31 PROCEDURE — 77063 BREAST TOMOSYNTHESIS BI: CPT | Mod: 26,,, | Performed by: RADIOLOGY

## 2022-10-31 PROCEDURE — 1159F PR MEDICATION LIST DOCUMENTED IN MEDICAL RECORD: ICD-10-PCS | Mod: CPTII,S$GLB,, | Performed by: INTERNAL MEDICINE

## 2022-10-31 PROCEDURE — 77067 SCR MAMMO BI INCL CAD: CPT | Mod: TC

## 2022-10-31 PROCEDURE — 77063 MAMMO DIGITAL SCREENING BILAT WITH TOMO: ICD-10-PCS | Mod: 26,,, | Performed by: RADIOLOGY

## 2022-10-31 PROCEDURE — 3078F DIAST BP <80 MM HG: CPT | Mod: CPTII,S$GLB,, | Performed by: INTERNAL MEDICINE

## 2022-10-31 PROCEDURE — 99999 PR PBB SHADOW E&M-EST. PATIENT-LVL V: CPT | Mod: PBBFAC,,, | Performed by: INTERNAL MEDICINE

## 2022-10-31 PROCEDURE — 99214 PR OFFICE/OUTPT VISIT, EST, LEVL IV, 30-39 MIN: ICD-10-PCS | Mod: S$GLB,,, | Performed by: INTERNAL MEDICINE

## 2022-10-31 RX ORDER — FLUOCINONIDE 0.5 MG/G
CREAM TOPICAL 2 TIMES DAILY
Qty: 30 G | Refills: 1 | Status: SHIPPED | OUTPATIENT
Start: 2022-10-31

## 2022-10-31 RX ORDER — PAROXETINE HYDROCHLORIDE 20 MG/1
20 TABLET, FILM COATED ORAL EVERY MORNING
Qty: 30 TABLET | Refills: 11 | Status: SHIPPED | OUTPATIENT
Start: 2022-10-31 | End: 2023-11-20

## 2023-04-20 DIAGNOSIS — F41.9 ANXIETY: Primary | ICD-10-CM

## 2023-04-20 RX ORDER — ALPRAZOLAM 0.5 MG/1
0.5 TABLET ORAL 3 TIMES DAILY PRN
Qty: 6 TABLET | Refills: 0 | Status: SHIPPED | OUTPATIENT
Start: 2023-04-20 | End: 2024-04-19

## 2023-10-02 DIAGNOSIS — Z85.3 HISTORY OF BREAST CANCER: Primary | ICD-10-CM

## 2023-10-02 DIAGNOSIS — E03.9 ACQUIRED HYPOTHYROIDISM: ICD-10-CM

## 2023-10-03 ENCOUNTER — TELEPHONE (OUTPATIENT)
Dept: HEMATOLOGY/ONCOLOGY | Facility: CLINIC | Age: 63
End: 2023-10-03

## 2023-10-12 DIAGNOSIS — I10 ESSENTIAL HYPERTENSION: ICD-10-CM

## 2023-10-12 DIAGNOSIS — N31.9 BLADDER DYSFUNCTION: ICD-10-CM

## 2023-10-12 RX ORDER — CLONIDINE HYDROCHLORIDE 0.1 MG/1
TABLET ORAL
Qty: 90 TABLET | Refills: 3 | Status: SHIPPED | OUTPATIENT
Start: 2023-10-12

## 2023-10-12 RX ORDER — TOLTERODINE 4 MG/1
CAPSULE, EXTENDED RELEASE ORAL
Qty: 90 CAPSULE | Refills: 3 | Status: SHIPPED | OUTPATIENT
Start: 2023-10-12 | End: 2024-01-10

## 2023-10-18 ENCOUNTER — OFFICE VISIT (OUTPATIENT)
Dept: OBSTETRICS AND GYNECOLOGY | Facility: CLINIC | Age: 63
End: 2023-10-18
Payer: COMMERCIAL

## 2023-10-18 VITALS
BODY MASS INDEX: 30.63 KG/M2 | DIASTOLIC BLOOD PRESSURE: 88 MMHG | SYSTOLIC BLOOD PRESSURE: 155 MMHG | WEIGHT: 184.06 LBS

## 2023-10-18 DIAGNOSIS — Z87.440 HISTORY OF UTI: ICD-10-CM

## 2023-10-18 DIAGNOSIS — Z01.419 ENCOUNTER FOR WELL WOMAN EXAM: Primary | ICD-10-CM

## 2023-10-18 DIAGNOSIS — Z12.11 COLON CANCER SCREENING: ICD-10-CM

## 2023-10-18 DIAGNOSIS — Z12.4 PAP SMEAR FOR CERVICAL CANCER SCREENING: ICD-10-CM

## 2023-10-18 PROCEDURE — 1159F MED LIST DOCD IN RCRD: CPT | Mod: CPTII,S$GLB,, | Performed by: FAMILY MEDICINE

## 2023-10-18 PROCEDURE — 1101F PR PT FALLS ASSESS DOC 0-1 FALLS W/OUT INJ PAST YR: ICD-10-PCS | Mod: CPTII,S$GLB,, | Performed by: FAMILY MEDICINE

## 2023-10-18 PROCEDURE — 99999 PR PBB SHADOW E&M-EST. PATIENT-LVL IV: ICD-10-PCS | Mod: PBBFAC,,, | Performed by: FAMILY MEDICINE

## 2023-10-18 PROCEDURE — 3077F SYST BP >= 140 MM HG: CPT | Mod: CPTII,S$GLB,, | Performed by: FAMILY MEDICINE

## 2023-10-18 PROCEDURE — 1159F PR MEDICATION LIST DOCUMENTED IN MEDICAL RECORD: ICD-10-PCS | Mod: CPTII,S$GLB,, | Performed by: FAMILY MEDICINE

## 2023-10-18 PROCEDURE — 99387 PR PREVENTIVE VISIT,NEW,65 & OVER: ICD-10-PCS | Mod: S$GLB,,, | Performed by: FAMILY MEDICINE

## 2023-10-18 PROCEDURE — 3079F PR MOST RECENT DIASTOLIC BLOOD PRESSURE 80-89 MM HG: ICD-10-PCS | Mod: CPTII,S$GLB,, | Performed by: FAMILY MEDICINE

## 2023-10-18 PROCEDURE — 87086 URINE CULTURE/COLONY COUNT: CPT | Performed by: FAMILY MEDICINE

## 2023-10-18 PROCEDURE — 87088 URINE BACTERIA CULTURE: CPT | Performed by: FAMILY MEDICINE

## 2023-10-18 PROCEDURE — 87624 HPV HI-RISK TYP POOLED RSLT: CPT | Performed by: FAMILY MEDICINE

## 2023-10-18 PROCEDURE — 1160F PR REVIEW ALL MEDS BY PRESCRIBER/CLIN PHARMACIST DOCUMENTED: ICD-10-PCS | Mod: CPTII,S$GLB,, | Performed by: FAMILY MEDICINE

## 2023-10-18 PROCEDURE — 3288F FALL RISK ASSESSMENT DOCD: CPT | Mod: CPTII,S$GLB,, | Performed by: FAMILY MEDICINE

## 2023-10-18 PROCEDURE — 1101F PT FALLS ASSESS-DOCD LE1/YR: CPT | Mod: CPTII,S$GLB,, | Performed by: FAMILY MEDICINE

## 2023-10-18 PROCEDURE — 99999 PR PBB SHADOW E&M-EST. PATIENT-LVL IV: CPT | Mod: PBBFAC,,, | Performed by: FAMILY MEDICINE

## 2023-10-18 PROCEDURE — 3288F PR FALLS RISK ASSESSMENT DOCUMENTED: ICD-10-PCS | Mod: CPTII,S$GLB,, | Performed by: FAMILY MEDICINE

## 2023-10-18 PROCEDURE — 88175 CYTOPATH C/V AUTO FLUID REDO: CPT | Performed by: FAMILY MEDICINE

## 2023-10-18 PROCEDURE — 1160F RVW MEDS BY RX/DR IN RCRD: CPT | Mod: CPTII,S$GLB,, | Performed by: FAMILY MEDICINE

## 2023-10-18 PROCEDURE — 3079F DIAST BP 80-89 MM HG: CPT | Mod: CPTII,S$GLB,, | Performed by: FAMILY MEDICINE

## 2023-10-18 PROCEDURE — 87077 CULTURE AEROBIC IDENTIFY: CPT | Performed by: FAMILY MEDICINE

## 2023-10-18 PROCEDURE — 87186 SC STD MICRODIL/AGAR DIL: CPT | Performed by: FAMILY MEDICINE

## 2023-10-18 PROCEDURE — 99387 INIT PM E/M NEW PAT 65+ YRS: CPT | Mod: S$GLB,,, | Performed by: FAMILY MEDICINE

## 2023-10-18 PROCEDURE — 3008F PR BODY MASS INDEX (BMI) DOCUMENTED: ICD-10-PCS | Mod: CPTII,S$GLB,, | Performed by: FAMILY MEDICINE

## 2023-10-18 PROCEDURE — 3077F PR MOST RECENT SYSTOLIC BLOOD PRESSURE >= 140 MM HG: ICD-10-PCS | Mod: CPTII,S$GLB,, | Performed by: FAMILY MEDICINE

## 2023-10-18 PROCEDURE — 3008F BODY MASS INDEX DOCD: CPT | Mod: CPTII,S$GLB,, | Performed by: FAMILY MEDICINE

## 2023-10-18 NOTE — PROGRESS NOTES
"HISTORY OF PRESENT ILLNESS:    Mayra Crawford is a 63 y.o. female, , No LMP recorded (lmp unknown). Patient has had a hysterectomy.,  presents for a routine annual exam .   Last pap:   NL   Last mammogram:  NL - this years scheduled already  Last colon ca screenin return in 10 yr   SA: male partner, does not use condoms  Contraception: status post hysterectomy.    Sexually transmitted infection risk: very low risk of STD exposure.   Menstrual flow: no VB  -no breast pain, abnormal vd  -uti 2 weeks ago took bactrim, felt sx resolved but have started to return. No fever,hematuria  -libido low. Vaginal "bumps" for 3-4 years (not changing/worsening/enlarging) no erythema or active drainage.  This is the extent of the patient's complaints at this time.     Past Medical History:   Diagnosis Date    Abnormal Pap smear of cervix     H/O of [facility]      Asthma     Breast cancer     left breast IDC    Chronic constipation     Genetic testing 2006    BRACAnalysis with rearrangement negative for mutation    Heart murmur     kidney stone        Past Surgical History:   Procedure Laterality Date    ANAL FISTULOTOMY      BILATERAL SALPINGOOPHORECTOMY      BREAST BIOPSY Right     excisional biopsy- benign    BREAST LUMPECTOMY Left      with ALND for IDC    CERVICAL CONIZATION   W/ LASER      CHOLECYSTECTOMY  2018    at [facility]    GALLBLADDER SURGERY  2018    HYSTERECTOMY  2002    TVH/ BSO       OOPHORECTOMY         MEDICATIONS AND ALLERGIES:      Current Outpatient Medications:     ALPRAZolam (XANAX) 0.5 MG tablet, Take 1 tablet (0.5 mg total) by mouth 3 (three) times daily as needed for Anxiety (before flying)., Disp: 6 tablet, Rfl: 0    CETIRIZINE HCL (ZYRTEC ORAL), daily as needed. , Disp: , Rfl:     cloNIDine (CATAPRES) 0.1 MG tablet, TAKE 1 TABLET BY MOUTH EVERY DAY, Disp: 90 tablet, Rfl: 3    docusate sodium (STOOL SOFTENER) 50 MG capsule, Take by " mouth 2 (two) times daily., Disp: , Rfl:     fluocinonide 0.05% (LIDEX) 0.05 % cream, Apply topically 2 (two) times daily. back, Disp: 30 g, Rfl: 1    fluticasone-salmeterol 250-50 mcg/dose (ADVAIR DISKUS) 250-50 mcg/dose diskus inhaler, Inhale 1 puff into the lungs 2 (two) times daily as needed. 1 Disk with Device Inhalation Twice a day, Disp: 60 each, Rfl: 12    meclizine (ANTIVERT) 12.5 mg tablet, Take 1 tablet (12.5 mg total) by mouth 3 (three) times daily as needed., Disp: 20 tablet, Rfl: 0    mometasone (ASMANEX TWISTHALER) 220 mcg (60 doses) AePB, 2 puffs inhaled once daily. Disp one canister, Disp: 1 g, Rfl: 1    ondansetron (ZOFRAN-ODT) 4 MG TbDL, Take 2 tablets (8 mg total) by mouth every 8 (eight) hours as needed., Disp: 5 tablet, Rfl: 0    paroxetine (PAXIL) 20 MG tablet, Take 1 tablet (20 mg total) by mouth every morning., Disp: 30 tablet, Rfl: 11    tolterodine (DETROL LA) 4 MG 24 hr capsule, TAKE 1 CAPSULE BY MOUTH EVERY DAY IN THE MORNING, Disp: 90 capsule, Rfl: 3    tretinoin (RETIN-A) 0.025 % cream, Apply topically every evening., Disp: 45 g, Rfl: 1    albuterol (PROVENTIL/VENTOLIN HFA) 90 mcg/actuation inhaler, Inhale 2 puffs into the lungs every 6 (six) hours as needed for Wheezing., Disp: 18 g, Rfl: 12    diazePAM (VALIUM) 5 MG tablet, TAKE 1 TABLET IN THE EVENING BEFORE APPOINTMENT AND 1 TABLET 1 HOUR BEFORE APPOINTMENT, Disp: , Rfl:     epinephrine (EPIPEN) 0.3 mg/0.3 mL (1:1,000) AtIn, Inject 0.3 mLs (0.3 mg total) into the muscle once. 1 Pen Injector Intramuscular .  use as directed, Disp: 0.3 mL, Rfl: 0    sertraline (ZOLOFT) 50 MG tablet, TAKE 1 TABLET BY MOUTH EVERY DAY (Patient not taking: Reported on 10/18/2023), Disp: 90 tablet, Rfl: 3    Review of patient's allergies indicates:   Allergen Reactions    Penicillins      Other reaction(s): Rash    Other Rash and Other (See Comments)     Other reaction(s): Swelling  Other reaction(s): Sneezing  Other reaction(s): Shortness  of breath    Pt reports she is allergic to meat       Family History   Problem Relation Age of Onset    Hypertension Mother     Nephrolithiasis Mother     Cancer Father         lung    Diabetes Father     Heart disease Father     Aortic aneurysm Father         Abdominal    Lung cancer Father     Breast cancer Maternal Aunt         late 50s    Cancer Maternal Aunt         breast    Breast cancer Maternal Aunt     Breast cancer Paternal Aunt     Ovarian cancer Neg Hx     Heart attack Neg Hx     Anal fissures Neg Hx     Colon cancer Neg Hx     Esophageal cancer Neg Hx        Social History     Socioeconomic History    Marital status:      Spouse name: Esvin    Number of children: 2   Occupational History    Occupation: [employer]     Employer: [employer]   Tobacco Use    Smoking status: Never    Smokeless tobacco: Never   Substance and Sexual Activity    Alcohol use: No     Alcohol/week: 0.0 standard drinks of alcohol    Drug use: No    Sexual activity: Yes     Partners: Male     Birth control/protection: Surgical     Comment: :    TVH/BSO         OB History    Para Term  AB Living   2 2 2     2   SAB IAB Ectopic Multiple Live Births           2      # Outcome Date GA Lbr Chris/2nd Weight Sex Delivery Anes PTL Lv   2 Term  40w0d  3.175 kg (7 lb) M Vag-Spont EPI  ANTONI   1 Term  40w0d  3.629 kg (8 lb) F Vag-Spont EPI  ANTONI      Obstetric Comments   Menarche 8          COMPREHENSIVE GYN HISTORY:  PAP History: + abnormal Paps.  Infection History: Denies STDs. Denies PID.  Benign History: Denies uterine fibroids. Denies ovarian cysts. Denies endometriosis. Denies other conditions.  Cancer History: Denies cervical cancer. Denies uterine cancer or hyperplasia. Denies ovarian cancer. Denies vulvar cancer or pre-cancer. Denies vaginal cancer or pre-cancer. Denies breast cancer. Denies colon cancer.      ROS:  GENERAL: No weight changes. No swelling. No fatigue. No  fever.  BREASTS: No pain. No lumps. No discharge.  ABDOMEN: No pain. No nausea. No vomiting. No diarrhea. No constipation.  REPRODUCTIVE: No abnormal bleeding. No pelvic pain. +low libido  VULVA: No pain. No lesions. No itching.  VAGINA: No relaxation. No itching. No odor. No discharge. No lesions.  URINARY: No incontinence. No nocturia. No frequency. No dysuria.    BP (!) 155/88   Wt 83.5 kg (184 lb 1.4 oz)   LMP  (LMP Unknown) Comment: TVH/BSO   2001  BMI 30.63 kg/m²     PE:  Physical Exam:   Constitutional: She is oriented to person, place, and time. She appears well-developed and well-nourished. No distress.      Neck: No thyroid mass and no thyromegaly present.     Pulmonary/Chest: Right breast exhibits no inverted nipple, no mass, no nipple discharge, no skin change, no tenderness and no bleeding. Left breast exhibits no inverted nipple, no mass, no nipple discharge, no skin change, no tenderness and no bleeding.        Abdominal: Soft. There is no abdominal tenderness.     Genitourinary:    Vagina normal.            Pelvic exam was performed with patient supine.   The external female genitalia was normal.   Genitalia hair distrobution normal .   There is lesion on the right labia. There is no rash on the right labia. There is lesion on the left labia. There is no rash on the left labia. There is an absent right adnexa and an absent left adnexa. Vaginal cuff normal.  No  no vaginal discharge, tenderness, bleeding, rectocele, cystocele or unspecified prolapse of vaginal walls in the vagina.    No foreign body in the vagina.   Vaginal atrophy noted. Cervix is absent.Uterus is absent. Normal urethral meatus.              Neurological: She is alert and oriented to person, place, and time.          PROCEDURES/ORDERS:  Pap- per pt request, [facility] in 1980s then hyst about 10 years ago per pt. Has had NL paps in the past several years      Assessment/Plan:    Encounter for well woman exam    Pap smear for cervical  cancer screening  -     HPV High Risk Genotypes, PCR  -     Liquid-Based Pap Smear, Screening    Colon cancer screening  -     Ambulatory referral/consult to Endo Procedure ; Future; Expected date: 10/19/2023    History of UTI  -     CULTURE, URINE    -discussed vaginal moisturizers and lubricants during intercourse for atrophy    COUNSELING:  The patient was counseled today on:  -A.C.S. Pap and pelvic exam guidelines, recomendations for yearly mammogram, monthly self breast exams and to follow up with her PCP for other health maintenance.    FOLLOW-UP  annually for [employer].

## 2023-10-20 LAB — BACTERIA UR CULT: ABNORMAL

## 2023-10-23 ENCOUNTER — PATIENT MESSAGE (OUTPATIENT)
Dept: OBSTETRICS AND GYNECOLOGY | Facility: CLINIC | Age: 63
End: 2023-10-23
Payer: COMMERCIAL

## 2023-10-23 DIAGNOSIS — N30.00 ACUTE CYSTITIS WITHOUT HEMATURIA: Primary | ICD-10-CM

## 2023-10-23 LAB
FINAL PATHOLOGIC DIAGNOSIS: NORMAL
Lab: NORMAL

## 2023-10-23 RX ORDER — NITROFURANTOIN 25; 75 MG/1; MG/1
100 CAPSULE ORAL 2 TIMES DAILY
Qty: 10 CAPSULE | Refills: 0 | Status: SHIPPED | OUTPATIENT
Start: 2023-10-23 | End: 2023-10-28

## 2023-10-24 LAB
HPV HR 12 DNA SPEC QL NAA+PROBE: NEGATIVE
HPV16 AG SPEC QL: NEGATIVE
HPV18 DNA SPEC QL NAA+PROBE: NEGATIVE

## 2023-11-08 ENCOUNTER — TELEPHONE (OUTPATIENT)
Dept: ENDOSCOPY | Facility: HOSPITAL | Age: 63
End: 2023-11-08
Payer: COMMERCIAL

## 2023-11-08 NOTE — TELEPHONE ENCOUNTER
Returned pt's call to schedule colonoscopy. No answer, left voicemail for pt to call the Endoscopy Scheduling department back or to await her PAT phone call on 11/14.

## 2023-11-09 ENCOUNTER — TELEPHONE (OUTPATIENT)
Dept: ENDOSCOPY | Facility: HOSPITAL | Age: 63
End: 2023-11-09
Payer: COMMERCIAL

## 2023-11-09 VITALS — BODY MASS INDEX: 30.66 KG/M2 | HEIGHT: 65 IN | WEIGHT: 184 LBS

## 2023-11-09 DIAGNOSIS — Z12.11 SPECIAL SCREENING FOR MALIGNANT NEOPLASMS, COLON: Primary | ICD-10-CM

## 2023-11-09 DIAGNOSIS — Z12.11 COLON CANCER SCREENING: ICD-10-CM

## 2023-11-09 RX ORDER — SOD SULF/POT CHLORIDE/MAG SULF 1.479 G
12 TABLET ORAL DAILY
Qty: 24 TABLET | Refills: 0 | Status: ON HOLD | OUTPATIENT
Start: 2023-11-09 | End: 2024-03-28 | Stop reason: CLARIF

## 2023-11-09 NOTE — TELEPHONE ENCOUNTER
Spoke to patient to schedule procedure(s) Colonoscopy       Physician to perform procedure(s) Dr. TAMARA Thorpe  Date of Procedure (s) 3/11/24  Arrival Time 7:00 AM  Time of Procedure(s) 8:00 AM   Location of Procedure(s) [address]  Type of Rx Prep sent to patient: Sutab  Instructions provided to patient via [facility]    Patient was informed on the following information and verbalized understanding. Screening questionnaire reviewed with patient and complete. If procedure requires anesthesia, a responsible adult needs to be present to accompany the patient home, patient cannot drive after receiving anesthesia. Appointment details are tentative, especially check-in time. Patient will receive a prep-op call 4 days prior to confirm check-in time for procedure. If applicable the patient should contact their pharmacy to verify Rx for procedure prep is ready for pick-up. Patient was advised to call the scheduling department at [phone] if pharmacy states no Rx is available. Patient was advised to call the endoscopy scheduling department if any questions or concerns arise.      [facility]oscopy Scheduling Department

## 2023-11-20 DIAGNOSIS — F41.9 ANXIETY: ICD-10-CM

## 2023-11-20 RX ORDER — PAROXETINE HYDROCHLORIDE 20 MG/1
20 TABLET, FILM COATED ORAL
Qty: 90 TABLET | Refills: 3 | Status: SHIPPED | OUTPATIENT
Start: 2023-11-20

## 2023-11-21 ENCOUNTER — OFFICE VISIT (OUTPATIENT)
Dept: HEMATOLOGY/ONCOLOGY | Facility: CLINIC | Age: 63
End: 2023-11-21
Payer: COMMERCIAL

## 2023-11-21 ENCOUNTER — HOSPITAL ENCOUNTER (OUTPATIENT)
Dept: RADIOLOGY | Facility: HOSPITAL | Age: 63
Discharge: HOME | End: 2023-11-21
Attending: INTERNAL MEDICINE
Payer: COMMERCIAL

## 2023-11-21 ENCOUNTER — TELEPHONE (OUTPATIENT)
Dept: FAMILY MEDICINE | Facility: CLINIC | Age: 63
End: 2023-11-21
Payer: COMMERCIAL

## 2023-11-21 VITALS
BODY MASS INDEX: 30.42 KG/M2 | RESPIRATION RATE: 18 BRPM | HEIGHT: 65 IN | DIASTOLIC BLOOD PRESSURE: 65 MMHG | WEIGHT: 182.56 LBS | HEIGHT: 64 IN | WEIGHT: 180 LBS | TEMPERATURE: 98 F | SYSTOLIC BLOOD PRESSURE: 140 MMHG | HEART RATE: 54 BPM | OXYGEN SATURATION: 96 % | BODY MASS INDEX: 30.73 KG/M2

## 2023-11-21 DIAGNOSIS — Z85.3 HISTORY OF BREAST CANCER: Primary | ICD-10-CM

## 2023-11-21 DIAGNOSIS — Z85.3 HISTORY OF BREAST CANCER: ICD-10-CM

## 2023-11-21 DIAGNOSIS — E03.9 ACQUIRED HYPOTHYROIDISM: ICD-10-CM

## 2023-11-21 DIAGNOSIS — N30.90 CYSTITIS: ICD-10-CM

## 2023-11-21 DIAGNOSIS — Z78.0 ENCOUNTER FOR OSTEOPOROSIS SCREENING IN ASYMPTOMATIC POSTMENOPAUSAL PATIENT: ICD-10-CM

## 2023-11-21 DIAGNOSIS — Z12.31 ENCOUNTER FOR SCREENING MAMMOGRAM FOR HIGH-RISK PATIENT: ICD-10-CM

## 2023-11-21 DIAGNOSIS — R30.0 DYSURIA: ICD-10-CM

## 2023-11-21 DIAGNOSIS — N39.0 RECURRENT UTI: ICD-10-CM

## 2023-11-21 DIAGNOSIS — Z13.820 ENCOUNTER FOR OSTEOPOROSIS SCREENING IN ASYMPTOMATIC POSTMENOPAUSAL PATIENT: ICD-10-CM

## 2023-11-21 DIAGNOSIS — L81.4 LENTIGINES: ICD-10-CM

## 2023-11-21 LAB
BACTERIA #/AREA URNS AUTO: ABNORMAL /HPF
BILIRUB UR QL STRIP: NEGATIVE
CLARITY UR REFRACT.AUTO: ABNORMAL
COLOR UR AUTO: YELLOW
GLUCOSE UR QL STRIP: NEGATIVE
HGB UR QL STRIP: NEGATIVE
KETONES UR QL STRIP: NEGATIVE
LEUKOCYTE ESTERASE UR QL STRIP: ABNORMAL
MICROSCOPIC COMMENT: ABNORMAL
NITRITE UR QL STRIP: POSITIVE
PH UR STRIP: 6 [PH] (ref 5–8)
PROT UR QL STRIP: NEGATIVE
RBC #/AREA URNS AUTO: 2 /HPF (ref 0–4)
SP GR UR STRIP: 1.01 (ref 1–1.03)
SQUAMOUS #/AREA URNS AUTO: 0 /HPF
URN SPEC COLLECT METH UR: ABNORMAL
WBC #/AREA URNS AUTO: 18 /HPF (ref 0–5)

## 2023-11-21 PROCEDURE — 3078F PR MOST RECENT DIASTOLIC BLOOD PRESSURE < 80 MM HG: ICD-10-PCS | Mod: CPTII,S$GLB,, | Performed by: INTERNAL MEDICINE

## 2023-11-21 PROCEDURE — 3077F SYST BP >= 140 MM HG: CPT | Mod: CPTII,S$GLB,, | Performed by: INTERNAL MEDICINE

## 2023-11-21 PROCEDURE — 77067 SCR MAMMO BI INCL CAD: CPT | Mod: 26,,, | Performed by: RADIOLOGY

## 2023-11-21 PROCEDURE — 77063 MAMMO DIGITAL SCREENING BILAT WITH TOMO: ICD-10-PCS | Mod: 26,,, | Performed by: RADIOLOGY

## 2023-11-21 PROCEDURE — 3288F PR FALLS RISK ASSESSMENT DOCUMENTED: ICD-10-PCS | Mod: CPTII,S$GLB,, | Performed by: INTERNAL MEDICINE

## 2023-11-21 PROCEDURE — 99999 PR PBB SHADOW E&M-EST. PATIENT-LVL V: ICD-10-PCS | Mod: PBBFAC,,, | Performed by: INTERNAL MEDICINE

## 2023-11-21 PROCEDURE — 3288F FALL RISK ASSESSMENT DOCD: CPT | Mod: CPTII,S$GLB,, | Performed by: INTERNAL MEDICINE

## 2023-11-21 PROCEDURE — 3008F PR BODY MASS INDEX (BMI) DOCUMENTED: ICD-10-PCS | Mod: CPTII,S$GLB,, | Performed by: INTERNAL MEDICINE

## 2023-11-21 PROCEDURE — 77067 MAMMO DIGITAL SCREENING BILAT WITH TOMO: ICD-10-PCS | Mod: 26,,, | Performed by: RADIOLOGY

## 2023-11-21 PROCEDURE — 1159F MED LIST DOCD IN RCRD: CPT | Mod: CPTII,S$GLB,, | Performed by: INTERNAL MEDICINE

## 2023-11-21 PROCEDURE — 99214 PR OFFICE/OUTPT VISIT, EST, LEVL IV, 30-39 MIN: ICD-10-PCS | Mod: S$GLB,,, | Performed by: INTERNAL MEDICINE

## 2023-11-21 PROCEDURE — 1159F PR MEDICATION LIST DOCUMENTED IN MEDICAL RECORD: ICD-10-PCS | Mod: CPTII,S$GLB,, | Performed by: INTERNAL MEDICINE

## 2023-11-21 PROCEDURE — 81001 URINALYSIS AUTO W/SCOPE: CPT | Performed by: INTERNAL MEDICINE

## 2023-11-21 PROCEDURE — 3008F BODY MASS INDEX DOCD: CPT | Mod: CPTII,S$GLB,, | Performed by: INTERNAL MEDICINE

## 2023-11-21 PROCEDURE — 99999 PR PBB SHADOW E&M-EST. PATIENT-LVL V: CPT | Mod: PBBFAC,,, | Performed by: INTERNAL MEDICINE

## 2023-11-21 PROCEDURE — 3077F PR MOST RECENT SYSTOLIC BLOOD PRESSURE >= 140 MM HG: ICD-10-PCS | Mod: CPTII,S$GLB,, | Performed by: INTERNAL MEDICINE

## 2023-11-21 PROCEDURE — 99214 OFFICE O/P EST MOD 30 MIN: CPT | Mod: S$GLB,,, | Performed by: INTERNAL MEDICINE

## 2023-11-21 PROCEDURE — 1101F PR PT FALLS ASSESS DOC 0-1 FALLS W/OUT INJ PAST YR: ICD-10-PCS | Mod: CPTII,S$GLB,, | Performed by: INTERNAL MEDICINE

## 2023-11-21 PROCEDURE — 1101F PT FALLS ASSESS-DOCD LE1/YR: CPT | Mod: CPTII,S$GLB,, | Performed by: INTERNAL MEDICINE

## 2023-11-21 PROCEDURE — 3078F DIAST BP <80 MM HG: CPT | Mod: CPTII,S$GLB,, | Performed by: INTERNAL MEDICINE

## 2023-11-21 PROCEDURE — 77063 BREAST TOMOSYNTHESIS BI: CPT | Mod: 26,,, | Performed by: RADIOLOGY

## 2023-11-21 PROCEDURE — 77067 SCR MAMMO BI INCL CAD: CPT | Mod: TC

## 2023-11-21 RX ORDER — SULFAMETHOXAZOLE AND TRIMETHOPRIM 800; 160 MG/1; MG/1
1 TABLET ORAL 2 TIMES DAILY
Qty: 10 TABLET | Refills: 0 | Status: ON HOLD | OUTPATIENT
Start: 2023-11-21 | End: 2024-03-28 | Stop reason: CLARIF

## 2023-11-21 RX ORDER — TRETINOIN 0.25 MG/G
CREAM TOPICAL NIGHTLY
Qty: 45 G | Refills: 1 | Status: SHIPPED | OUTPATIENT
Start: 2023-11-21

## 2023-11-21 NOTE — TELEPHONE ENCOUNTER
Patient looking for PCP on the [facility] - [place], [place]     Give her a call and let her know we received a message from her oncologist Dr. Lewis that he would like for you to establish care with Dr. Long and that would be okay     Schedule an appointment for patient probably January or so    If she has some need/refill issue that she would like to have an appointment in December, there probably are available appointments as well

## 2023-11-21 NOTE — TELEPHONE ENCOUNTER
----- Message from Delfin Lewis MD sent at 11/21/2023  8:23 AM CST -----  [place] [facility] [place]   Delfin

## 2023-11-21 NOTE — PROGRESS NOTES
Subjective:       Patient ID: Mayra Crawford is a 63 y.o. female.    Chief Complaint: No chief complaint on file.      HPI Ms. Crawford returned to clinic for followup of a remote history of carcinoma of the left breast.  She has hypothyroidism and asthma.      Overall, she is been feeling well.  Her asthma has not caused her any recent problems.    She has had some intermittent dysuria.  She is not exercising.          Breast history :  Breast Cancer Stage 1 ER+ diagnosed in 1992 treated with lumpectomy and adjuvant       chemotherapy with 5-FU and methotrexate on protocol NSABP B-20.    She then took 5 years of tamoxifen and completed in 1997.    BMD in February 2020 showed osteopenia      Review of Systems   Constitutional:  Negative for activity change, appetite change and unexpected weight change.   Respiratory:  Negative for cough, shortness of breath and wheezing.    Cardiovascular:  Negative for chest pain.   Gastrointestinal:  Negative for abdominal distention, diarrhea, nausea and vomiting.   Musculoskeletal:  Negative for back pain and neck pain.   Skin:  Negative for rash.   Psychiatric/Behavioral:  Negative for dysphoric mood. The patient is not nervous/anxious.        Objective:      Physical Exam  Vitals reviewed.   Constitutional:       Appearance: She is well-developed.   HENT:      Mouth/Throat:      Pharynx: No oropharyngeal exudate.   Eyes:      General: No scleral icterus.  Cardiovascular:      Rate and Rhythm: Normal rate and regular rhythm.   Pulmonary:      Effort: Pulmonary effort is normal.      Breath sounds: Normal breath sounds. No wheezing or rales.   Chest:   Breasts:     Right: No mass, nipple discharge or skin change.      Left: No mass, nipple discharge or skin change.       Abdominal:      Palpations: Abdomen is soft. There is no mass.      Tenderness: There is no abdominal tenderness.   Lymphadenopathy:      Cervical: No cervical adenopathy.      Upper Body:      Right upper body:  No supraclavicular adenopathy.      Left upper body: No supraclavicular adenopathy.   Neurological:      Mental Status: She is oriented to person, place, and time.   Psychiatric:         Mood and Affect: Mood normal.         Behavior: Behavior normal.         Thought Content: Thought content normal.         Judgment: Judgment normal.       Assessment:     CMP - normal, lipids normal, TSH - normal    CBC - Plts [id number], otherwise normal  1. History of breast cancer    2. Acquired hypothyroidism        Plan:       Mammogram - negative  Due for DEXA.  Check UA  - + for nitrites and bacteria -will call in ABX and set up Urology    Return in 1 year with mammograms and labs.  We discussed weight loss.  Recommended she start with some exercise.  I told her that if she is interested medication she could be seen in the weight loss clinic.      Route Chart for Scheduling    Med Onc Chart Routing      Follow up with physician 1 year.   Follow up with MARYANN    Infusion scheduling note    Injection scheduling note    Labs CMP, other, CBC and TSH   Scheduling:  Preferred lab:  Lab interval:  lipids   Imaging Mammogram and DXA scan   DEXA next avail, Mammo 1 year   Pharmacy appointment No pharmacy appointment needed      Other referrals no referral to Oncology Primary Care needed -  no Massage appointment needed    Additional referrals needed  Uro-GYN

## 2023-11-29 DIAGNOSIS — M85.89 OSTEOPENIA OF MULTIPLE SITES: Primary | ICD-10-CM

## 2023-11-29 RX ORDER — IBANDRONATE SODIUM 150 MG/1
150 TABLET, FILM COATED ORAL
Qty: 3 TABLET | Refills: 3 | Status: SHIPPED | OUTPATIENT
Start: 2023-11-29 | End: 2024-11-28

## 2023-11-29 NOTE — PROGRESS NOTES
DEXA shows osteopenia hip and spine.   Discussed with the patient. She will restart Vitamin D and Calcium.   Will add Boniva.  DEX in 2 years

## 2023-12-07 ENCOUNTER — TELEPHONE (OUTPATIENT)
Dept: HEMATOLOGY/ONCOLOGY | Facility: CLINIC | Age: 63
End: 2023-12-07
Payer: COMMERCIAL

## 2023-12-07 NOTE — TELEPHONE ENCOUNTER
Called pt. Reported concerns with side effects with Boniva. Pt would like to speak with provider for other substitutions for prescription vit D or calcium. Message forwarded to Dr Lewis.

## 2023-12-07 NOTE — TELEPHONE ENCOUNTER
----- Message from Trenton Hill sent at 12/7/2023  9:05 AM CST -----  Regarding: Consult/Advisory  Contact: Mayra Crawford  Consult/Advisory    Name Of Caller: Mayra Crawford       Contact Preference: [phone] (Mobile)    Nature of call: Patient calling to speak to doctor regarding side effects of Rx meds for osteoporosis. Requesting a call back.

## 2023-12-13 ENCOUNTER — HOSPITAL ENCOUNTER (OUTPATIENT)
Dept: RADIOLOGY | Facility: HOSPITAL | Age: 63
Discharge: HOME | End: 2023-12-13
Attending: STUDENT IN AN ORGANIZED HEALTH CARE EDUCATION/TRAINING PROGRAM
Payer: COMMERCIAL

## 2023-12-13 ENCOUNTER — OFFICE VISIT (OUTPATIENT)
Dept: UROLOGY | Facility: CLINIC | Age: 63
End: 2023-12-13
Payer: COMMERCIAL

## 2023-12-13 VITALS — BODY MASS INDEX: 31.85 KG/M2 | WEIGHT: 185.56 LBS

## 2023-12-13 DIAGNOSIS — N20.0 LEFT NEPHROLITHIASIS: ICD-10-CM

## 2023-12-13 DIAGNOSIS — Z85.3 HISTORY OF BREAST CANCER: ICD-10-CM

## 2023-12-13 DIAGNOSIS — N39.0 RECURRENT UTI: Primary | ICD-10-CM

## 2023-12-13 PROCEDURE — 99999 PR PBB SHADOW E&M-EST. PATIENT-LVL IV: CPT | Mod: PBBFAC,,, | Performed by: STUDENT IN AN ORGANIZED HEALTH CARE EDUCATION/TRAINING PROGRAM

## 2023-12-13 PROCEDURE — 1101F PR PT FALLS ASSESS DOC 0-1 FALLS W/OUT INJ PAST YR: ICD-10-PCS | Mod: CPTII,S$GLB,, | Performed by: STUDENT IN AN ORGANIZED HEALTH CARE EDUCATION/TRAINING PROGRAM

## 2023-12-13 PROCEDURE — 99204 OFFICE O/P NEW MOD 45 MIN: CPT | Mod: S$GLB,,, | Performed by: STUDENT IN AN ORGANIZED HEALTH CARE EDUCATION/TRAINING PROGRAM

## 2023-12-13 PROCEDURE — 76770 US EXAM ABDO BACK WALL COMP: CPT | Mod: 26,,, | Performed by: INTERNAL MEDICINE

## 2023-12-13 PROCEDURE — 99204 PR OFFICE/OUTPT VISIT, NEW, LEVL IV, 45-59 MIN: ICD-10-PCS | Mod: S$GLB,,, | Performed by: STUDENT IN AN ORGANIZED HEALTH CARE EDUCATION/TRAINING PROGRAM

## 2023-12-13 PROCEDURE — 74018 RADEX ABDOMEN 1 VIEW: CPT | Mod: TC,FY

## 2023-12-13 PROCEDURE — 3008F BODY MASS INDEX DOCD: CPT | Mod: CPTII,S$GLB,, | Performed by: STUDENT IN AN ORGANIZED HEALTH CARE EDUCATION/TRAINING PROGRAM

## 2023-12-13 PROCEDURE — 1159F MED LIST DOCD IN RCRD: CPT | Mod: CPTII,S$GLB,, | Performed by: STUDENT IN AN ORGANIZED HEALTH CARE EDUCATION/TRAINING PROGRAM

## 2023-12-13 PROCEDURE — 74018 RADEX ABDOMEN 1 VIEW: CPT | Mod: 26,,, | Performed by: INTERNAL MEDICINE

## 2023-12-13 PROCEDURE — 3288F FALL RISK ASSESSMENT DOCD: CPT | Mod: CPTII,S$GLB,, | Performed by: STUDENT IN AN ORGANIZED HEALTH CARE EDUCATION/TRAINING PROGRAM

## 2023-12-13 PROCEDURE — 74018 XR ABDOMEN AP 1 VIEW: ICD-10-PCS | Mod: 26,,, | Performed by: INTERNAL MEDICINE

## 2023-12-13 PROCEDURE — 76770 US EXAM ABDO BACK WALL COMP: CPT | Mod: TC

## 2023-12-13 PROCEDURE — 1101F PT FALLS ASSESS-DOCD LE1/YR: CPT | Mod: CPTII,S$GLB,, | Performed by: STUDENT IN AN ORGANIZED HEALTH CARE EDUCATION/TRAINING PROGRAM

## 2023-12-13 PROCEDURE — 3008F PR BODY MASS INDEX (BMI) DOCUMENTED: ICD-10-PCS | Mod: CPTII,S$GLB,, | Performed by: STUDENT IN AN ORGANIZED HEALTH CARE EDUCATION/TRAINING PROGRAM

## 2023-12-13 PROCEDURE — 3288F PR FALLS RISK ASSESSMENT DOCUMENTED: ICD-10-PCS | Mod: CPTII,S$GLB,, | Performed by: STUDENT IN AN ORGANIZED HEALTH CARE EDUCATION/TRAINING PROGRAM

## 2023-12-13 PROCEDURE — 99999 PR PBB SHADOW E&M-EST. PATIENT-LVL IV: ICD-10-PCS | Mod: PBBFAC,,, | Performed by: STUDENT IN AN ORGANIZED HEALTH CARE EDUCATION/TRAINING PROGRAM

## 2023-12-13 PROCEDURE — 76770 US RETROPERITONEAL COMPLETE: ICD-10-PCS | Mod: 26,,, | Performed by: INTERNAL MEDICINE

## 2023-12-13 PROCEDURE — 1159F PR MEDICATION LIST DOCUMENTED IN MEDICAL RECORD: ICD-10-PCS | Mod: CPTII,S$GLB,, | Performed by: STUDENT IN AN ORGANIZED HEALTH CARE EDUCATION/TRAINING PROGRAM

## 2023-12-13 NOTE — PROGRESS NOTES
Patient ID: Mayra Crawford is a 63 y.o. female.    Chief Complaint:  Recurrent UTI    Referral: Delfin Lewis MD  [address] [place] [address]      HPI      63 y.o. presents to Urology clinic for evaluation of recurrent urinary tract infections.  Symptoms of UTI typical include:  urgency, frequency, dysuria  .   Patient notes she was in her usual state of health until she developed the aforementioned symptoms, she was prescribed a 10 d course of abx without culture, she followed up with her GYN for persistent symptoms a urine culture revealed pan sens E coli ( 10/18/23), patient then presented to her PCP, Dr. Lewis for persistent symptoms, a dip test demonstrated nitrite positivity, 1+ leukocytes, occasional bacterial, 2 RBC, 18 wbc,  no urine culture was obtained during that encounter, patient was again prescribed additional antibiotics.     Patient w/ hx of stones, last imaging of kidneys performed in 2019, noted to have L renal stones. She presumed she has passed the stones as she has not been symptomatic.     Patient w/ hx of breast cancer          She denies vaginal dryness, notes acceptable libido. Notes difficulty with orgasm. Denies pain with sexual activity.       Notes constipation, on miralax, does not see a GI specialist    Medically necessary ROS documented in HPI    Past Medical History  Active Ambulatory Problems     Diagnosis Date Noted    Rhinitis, allergic 08/15/2012    Food allergy 08/15/2012    History of breast cancer 11/12/2012    Nephrolithiasis 11/12/2012    Colon cancer screening 10/18/2013    Subcutaneous nodule 06/11/2014    Mild intermittent asthma without complication 09/12/2016    Acquired hypothyroidism 09/19/2017    Osteopenia of multiple sites 11/29/2023     Resolved Ambulatory Problems     Diagnosis Date Noted    Unspecified asthma(493.90) 08/15/2012    Dysuria 11/10/2012    Nausea 11/10/2012    Fever 11/14/2012    CMV (cytomegalovirus infection) 12/03/2012    Rib  pain on right side 08/20/2013    Non-cardiac chest pain 01/26/2015    Upper abdominal pain 01/24/2018    Gallstones 03/15/2018     Past Medical History:   Diagnosis Date    Abnormal Pap smear of cervix 1988    Asthma     Breast cancer 1992    Chronic constipation     Genetic testing 12/2006    Heart murmur     kidney stone          Past Surgical History  Past Surgical History:   Procedure Laterality Date    ANAL FISTULOTOMY      BILATERAL SALPINGOOPHORECTOMY      BREAST BIOPSY Right ~1995    excisional biopsy- benign    BREAST LUMPECTOMY Left 1992     with ALND for IDC    CERVICAL CONIZATION   W/ LASER  1988    CHOLECYSTECTOMY  04/2018    at [facility]    GALLBLADDER SURGERY  04/2018    HYSTERECTOMY  2002    TVH/ BSO       OOPHORECTOMY  2002       Social History  Social Connections: Not on file       Medications    Current Outpatient Medications:     albuterol (PROVENTIL/VENTOLIN HFA) 90 mcg/actuation inhaler, Inhale 2 puffs into the lungs every 6 (six) hours as needed for Wheezing., Disp: 18 g, Rfl: 12    ALPRAZolam (XANAX) 0.5 MG tablet, Take 1 tablet (0.5 mg total) by mouth 3 (three) times daily as needed for Anxiety (before flying)., Disp: 6 tablet, Rfl: 0    CETIRIZINE HCL (ZYRTEC ORAL), daily as needed. , Disp: , Rfl:     cloNIDine (CATAPRES) 0.1 MG tablet, TAKE 1 TABLET BY MOUTH EVERY DAY, Disp: 90 tablet, Rfl: 3    diazePAM (VALIUM) 5 MG tablet, TAKE 1 TABLET IN THE EVENING BEFORE APPOINTMENT AND 1 TABLET 1 HOUR BEFORE APPOINTMENT, Disp: , Rfl:     docusate sodium (STOOL SOFTENER) 50 MG capsule, Take by mouth 2 (two) times daily., Disp: , Rfl:     epinephrine (EPIPEN) 0.3 mg/0.3 mL (1:1,000) AtIn, Inject 0.3 mLs (0.3 mg total) into the muscle once. 1 Pen Injector Intramuscular .  use as directed, Disp: 0.3 mL, Rfl: 0    fluocinonide 0.05% (LIDEX) 0.05 % cream, Apply topically 2 (two) times daily. back, Disp: 30 g, Rfl: 1    fluticasone-salmeterol 250-50 mcg/dose (ADVAIR DISKUS) 250-50 mcg/dose diskus inhaler,  Inhale 1 puff into the lungs 2 (two) times daily as needed. 1 Disk with Device Inhalation Twice a day, Disp: 60 each, Rfl: 12    ibandronate (BONIVA) 150 mg tablet, Take 1 tablet (150 mg total) by mouth every 30 days., Disp: 3 tablet, Rfl: 3    meclizine (ANTIVERT) 12.5 mg tablet, Take 1 tablet (12.5 mg total) by mouth 3 (three) times daily as needed., Disp: 20 tablet, Rfl: 0    mometasone (ASMANEX TWISTHALER) 220 mcg (60 doses) AePB, 2 puffs inhaled once daily. Disp one canister, Disp: 1 g, Rfl: 1    ondansetron (ZOFRAN-ODT) 4 MG TbDL, Take 2 tablets (8 mg total) by mouth every 8 (eight) hours as needed., Disp: 5 tablet, Rfl: 0    paroxetine (PAXIL) 20 MG tablet, TAKE 1 TABLET BY MOUTH EVERY DAY IN THE MORNING, Disp: 90 tablet, Rfl: 3    sertraline (ZOLOFT) 50 MG tablet, TAKE 1 TABLET BY MOUTH EVERY DAY (Patient not taking: Reported on 10/18/2023), Disp: 90 tablet, Rfl: 3    sod sulf-pot chloride-mag sulf (SUTAB) 1.479-0.188- 0.225 gram tablet, Take 12 tablets by mouth once daily. BIN: [id number]  PCN: [id number]  GROUP: [id number] MEMBER ID: [id number], Disp: 24 tablet, Rfl: 0    sulfamethoxazole-trimethoprim 800-160mg (BACTRIM DS) 800-160 mg Tab, Take 1 tablet by mouth 2 (two) times daily., Disp: 10 tablet, Rfl: 0    tolterodine (DETROL LA) 4 MG 24 hr capsule, TAKE 1 CAPSULE BY MOUTH EVERY DAY IN THE MORNING, Disp: 90 capsule, Rfl: 3    tretinoin (RETIN-A) 0.025 % cream, Apply topically every evening., Disp: 45 g, Rfl: 1    Allergies  Review of patient's allergies indicates:   Allergen Reactions    Penicillins      Other reaction(s): Rash    Other Rash and Other (See Comments)     Other reaction(s): Swelling  Other reaction(s): Sneezing  Other reaction(s): Shortness of breath    Pt reports she is allergic to meat       Patient's PMH, FH, Social hx, Medications, allergies reviewed and updated as pertinent to today's visit.  Objective:      Physical Exam  Constitutional:       Appearance: She is well-developed.   HENT:       Head: Normocephalic and atraumatic.   Eyes:      Conjunctiva/sclera: Conjunctivae normal.   Pulmonary:      Effort: Pulmonary effort is normal. No respiratory distress.   Abdominal:      General: Abdomen is flat. There is no distension.      Palpations: Abdomen is soft. There is no mass.      Tenderness: There is no abdominal tenderness. There is no right CVA tenderness, left CVA tenderness or guarding.   Skin:     General: Skin is warm.      Findings: No rash.   Neurological:      Mental Status: She is alert and oriented to person, place, and time.   Psychiatric:         Behavior: Behavior normal.           Assessment:       1. Recurrent UTI    2. Left nephrolithiasis    3. History of breast cancer        Plan:             Recurrent UTI  Discussed the importance of urine culture prior to receipt of antibiotics to tailor treatment and optimize care  Avoid constipation, increase fiber, stool softeners, follow up with GI specialist.     Genitourinary syndrome of menopause  ( Patient w/ hx of breast cancer, diagnosed in 1992)   -The efficacy of estrogen for the prevention of UTIs in post-menopausal women has been demonstrated in several studies. There appears to be a higher effectiveness rate in topically applied estrogen in the vagina with an improvement in lactobacillus concentrations, decreased vaginal pH and a decrease in UTI episodes.  - Patient has declined E replacement due to her history. E receptor history is not known at the time of consultation.     Patient w/ hx of stones, imaging recommended to evaluate for nidus for infection    Cystoscopy can be considered after imaging completed.

## 2023-12-14 ENCOUNTER — TELEPHONE (OUTPATIENT)
Dept: UROLOGY | Facility: CLINIC | Age: 63
End: 2023-12-14
Payer: COMMERCIAL

## 2023-12-14 NOTE — TELEPHONE ENCOUNTER
----- Message from Yadira Callahan sent at 12/14/2023 11:03 AM CST -----  Regarding: self [phone]  Type: Patient Call Back    Who called:self     What is the request in detail: pt stated she was told to get a CAT scan but when trying to schedule it the order is not in system.     Can the clinic reply by [facility]?  yes    Would the patient rather a call back or a response via [facility]?  My chart, she stated Monday and Tuesday early morning would be great.     Best call back number: [phone]

## 2023-12-22 DIAGNOSIS — N20.0 LEFT NEPHROLITHIASIS: Primary | ICD-10-CM

## 2024-01-05 ENCOUNTER — HOSPITAL ENCOUNTER (OUTPATIENT)
Dept: RADIOLOGY | Facility: HOSPITAL | Age: 64
Discharge: HOME | End: 2024-01-05
Attending: STUDENT IN AN ORGANIZED HEALTH CARE EDUCATION/TRAINING PROGRAM
Payer: COMMERCIAL

## 2024-01-05 DIAGNOSIS — N20.0 LEFT NEPHROLITHIASIS: ICD-10-CM

## 2024-01-05 PROCEDURE — 74150 CT ABDOMEN W/O CONTRAST: CPT | Mod: TC

## 2024-01-05 PROCEDURE — 74150 CT ABDOMEN W/O CONTRAST: CPT | Mod: 26,,, | Performed by: RADIOLOGY

## 2024-01-10 ENCOUNTER — OFFICE VISIT (OUTPATIENT)
Dept: UROLOGY | Facility: CLINIC | Age: 64
End: 2024-01-10
Payer: COMMERCIAL

## 2024-01-10 VITALS — BODY MASS INDEX: 32.43 KG/M2 | WEIGHT: 188.94 LBS

## 2024-01-10 DIAGNOSIS — Z85.3 HISTORY OF BREAST CANCER: ICD-10-CM

## 2024-01-10 DIAGNOSIS — K59.00 CONSTIPATION, UNSPECIFIED CONSTIPATION TYPE: ICD-10-CM

## 2024-01-10 DIAGNOSIS — N20.0 LEFT NEPHROLITHIASIS: ICD-10-CM

## 2024-01-10 DIAGNOSIS — N39.0 RECURRENT UTI: ICD-10-CM

## 2024-01-10 DIAGNOSIS — R39.15 URINARY URGENCY: Primary | ICD-10-CM

## 2024-01-10 PROCEDURE — 1160F RVW MEDS BY RX/DR IN RCRD: CPT | Mod: CPTII,S$GLB,, | Performed by: STUDENT IN AN ORGANIZED HEALTH CARE EDUCATION/TRAINING PROGRAM

## 2024-01-10 PROCEDURE — 3008F BODY MASS INDEX DOCD: CPT | Mod: CPTII,S$GLB,, | Performed by: STUDENT IN AN ORGANIZED HEALTH CARE EDUCATION/TRAINING PROGRAM

## 2024-01-10 PROCEDURE — 99214 OFFICE O/P EST MOD 30 MIN: CPT | Mod: S$GLB,,, | Performed by: STUDENT IN AN ORGANIZED HEALTH CARE EDUCATION/TRAINING PROGRAM

## 2024-01-10 PROCEDURE — 99999 PR PBB SHADOW E&M-EST. PATIENT-LVL IV: CPT | Mod: PBBFAC,,, | Performed by: STUDENT IN AN ORGANIZED HEALTH CARE EDUCATION/TRAINING PROGRAM

## 2024-01-10 PROCEDURE — 3288F FALL RISK ASSESSMENT DOCD: CPT | Mod: CPTII,S$GLB,, | Performed by: STUDENT IN AN ORGANIZED HEALTH CARE EDUCATION/TRAINING PROGRAM

## 2024-01-10 PROCEDURE — 1126F AMNT PAIN NOTED NONE PRSNT: CPT | Mod: CPTII,S$GLB,, | Performed by: STUDENT IN AN ORGANIZED HEALTH CARE EDUCATION/TRAINING PROGRAM

## 2024-01-10 PROCEDURE — 1159F MED LIST DOCD IN RCRD: CPT | Mod: CPTII,S$GLB,, | Performed by: STUDENT IN AN ORGANIZED HEALTH CARE EDUCATION/TRAINING PROGRAM

## 2024-01-10 PROCEDURE — 1101F PT FALLS ASSESS-DOCD LE1/YR: CPT | Mod: CPTII,S$GLB,, | Performed by: STUDENT IN AN ORGANIZED HEALTH CARE EDUCATION/TRAINING PROGRAM

## 2024-01-10 NOTE — PROGRESS NOTES
Patient ID: Mayra Crawford is a 63 y.o. female.    Chief Complaint: Follow-up (Pt is here for f/u on ct)      HPI  Patient initially evaluated for recurrent UTIS. Found to have two renal stones, left sided. Here to discuss management.     Accompanied by .     Patient seeking alternative to urinary urgency medication. Patient on detrol without alleviation of symptoms. Patient drinks 3-4 caffeine free beverages in addition to 3-4 bottles of water daily.     Baseline constipation, has yet to discuss with PCP, upcoming appt planned    Family member with hx of sepsis 2/2 obstructing stone resulting in amputations    Medically necessary ROS documented in HPI    Past Medical History  Active Ambulatory Problems     Diagnosis Date Noted    Rhinitis, allergic 08/15/2012    Food allergy 08/15/2012    History of breast cancer 11/12/2012    Nephrolithiasis 11/12/2012    Colon cancer screening 10/18/2013    Subcutaneous nodule 06/11/2014    Mild intermittent asthma without complication 09/12/2016    Acquired hypothyroidism 09/19/2017    Osteopenia of multiple sites 11/29/2023     Resolved Ambulatory Problems     Diagnosis Date Noted    Unspecified asthma(493.90) 08/15/2012    Dysuria 11/10/2012    Nausea 11/10/2012    Fever 11/14/2012    CMV (cytomegalovirus infection) 12/03/2012    Rib pain on right side 08/20/2013    Non-cardiac chest pain 01/26/2015    Upper abdominal pain 01/24/2018    Gallstones 03/15/2018     Past Medical History:   Diagnosis Date    Abnormal Pap smear of cervix 1988    Asthma     Breast cancer 1992    Chronic constipation     Genetic testing 12/2006    Heart murmur     kidney stone          Past Surgical History  Past Surgical History:   Procedure Laterality Date    ANAL FISTULOTOMY      BILATERAL SALPINGOOPHORECTOMY      BREAST BIOPSY Right ~1995    excisional biopsy- benign    BREAST LUMPECTOMY Left 1992     with ALND for IDC    CERVICAL CONIZATION   W/ LASER  1988    CHOLECYSTECTOMY  04/2018     at [facility]    GALLBLADDER SURGERY  04/2018    HYSTERECTOMY  2002    TVH/ BSO       OOPHORECTOMY  2002       Social History  Social Connections: Not on file       Medications    Current Outpatient Medications:     ALPRAZolam (XANAX) 0.5 MG tablet, Take 1 tablet (0.5 mg total) by mouth 3 (three) times daily as needed for Anxiety (before flying)., Disp: 6 tablet, Rfl: 0    CETIRIZINE HCL (ZYRTEC ORAL), daily as needed. , Disp: , Rfl:     cloNIDine (CATAPRES) 0.1 MG tablet, TAKE 1 TABLET BY MOUTH EVERY DAY, Disp: 90 tablet, Rfl: 3    diazePAM (VALIUM) 5 MG tablet, TAKE 1 TABLET IN THE EVENING BEFORE APPOINTMENT AND 1 TABLET 1 HOUR BEFORE APPOINTMENT, Disp: , Rfl:     docusate sodium (STOOL SOFTENER) 50 MG capsule, Take by mouth 2 (two) times daily., Disp: , Rfl:     fluocinonide 0.05% (LIDEX) 0.05 % cream, Apply topically 2 (two) times daily. back, Disp: 30 g, Rfl: 1    fluticasone-salmeterol 250-50 mcg/dose (ADVAIR DISKUS) 250-50 mcg/dose diskus inhaler, Inhale 1 puff into the lungs 2 (two) times daily as needed. 1 Disk with Device Inhalation Twice a day, Disp: 60 each, Rfl: 12    ibandronate (BONIVA) 150 mg tablet, Take 1 tablet (150 mg total) by mouth every 30 days., Disp: 3 tablet, Rfl: 3    meclizine (ANTIVERT) 12.5 mg tablet, Take 1 tablet (12.5 mg total) by mouth 3 (three) times daily as needed., Disp: 20 tablet, Rfl: 0    mometasone (ASMANEX TWISTHALER) 220 mcg (60 doses) AePB, 2 puffs inhaled once daily. Disp one canister, Disp: 1 g, Rfl: 1    ondansetron (ZOFRAN-ODT) 4 MG TbDL, Take 2 tablets (8 mg total) by mouth every 8 (eight) hours as needed., Disp: 5 tablet, Rfl: 0    paroxetine (PAXIL) 20 MG tablet, TAKE 1 TABLET BY MOUTH EVERY DAY IN THE MORNING, Disp: 90 tablet, Rfl: 3    sod sulf-pot chloride-mag sulf (SUTAB) 1.479-0.188- 0.225 gram tablet, Take 12 tablets by mouth once daily. BIN: [id number]  PCN: TAYLOR  GROUP: [id number] MEMBER ID: [id number], Disp: 24 tablet, Rfl: 0    sulfamethoxazole-trimethoprim  800-160mg (BACTRIM DS) 800-160 mg Tab, Take 1 tablet by mouth 2 (two) times daily., Disp: 10 tablet, Rfl: 0    tolterodine (DETROL LA) 4 MG 24 hr capsule, TAKE 1 CAPSULE BY MOUTH EVERY DAY IN THE MORNING, Disp: 90 capsule, Rfl: 3    tretinoin (RETIN-A) 0.025 % cream, Apply topically every evening., Disp: 45 g, Rfl: 1    albuterol (PROVENTIL/VENTOLIN HFA) 90 mcg/actuation inhaler, Inhale 2 puffs into the lungs every 6 (six) hours as needed for Wheezing., Disp: 18 g, Rfl: 12    epinephrine (EPIPEN) 0.3 mg/0.3 mL (1:1,000) AtIn, Inject 0.3 mLs (0.3 mg total) into the muscle once. 1 Pen Injector Intramuscular .  use as directed, Disp: 0.3 mL, Rfl: 0    sertraline (ZOLOFT) 50 MG tablet, TAKE 1 TABLET BY MOUTH EVERY DAY (Patient not taking: Reported on 10/18/2023), Disp: 90 tablet, Rfl: 3    Allergies  Review of patient's allergies indicates:   Allergen Reactions    Penicillins      Other reaction(s): Rash    Other Rash and Other (See Comments)     Other reaction(s): Swelling  Other reaction(s): Sneezing  Other reaction(s): Shortness of breath    Pt reports she is allergic to meat       Patient's PMH, FH, PSH, Social hx, Medications, allergies reviewed and updated as pertinent to today's visit.  Objective:      Physical Exam  Constitutional:       Appearance: She is well-developed.   HENT:      Head: Normocephalic and atraumatic.   Eyes:      Conjunctiva/sclera: Conjunctivae normal.   Pulmonary:      Effort: Pulmonary effort is normal. No respiratory distress.   Abdominal:      General: Abdomen is flat.   Skin:     Findings: No rash.   Neurological:      Mental Status: She is alert and oriented to person, place, and time.   Psychiatric:         Behavior: Behavior normal.           Imaging results: personally reviewed CT renal stone  2 x 0.9cm stones noted in Left lower pole    Assessment:       1. Urinary urgency    2. Left nephrolithiasis    3. Recurrent UTI    4. History of breast cancer        Plan:           Left  renal stones  Discussed the etiology and management of urinary stone disease.     Discussed importance of follow up work up of stone disease to evaluate potential risk factors      We discussed that stones can be managed with observation, trial of passage, ureteroscopy with laser lithotripsy, ESWL and PCNL. After extensive discussion of risks, benefits, alternative treatment strategies patient has decided to proceed with reviewing her options including second opinion from Dr. Kan at [facility] as she prefers to have her procedures performed at [facility].   Discussed ESWL not advised given her hx of recurrent UTIs, increased risk of stone obstruction and sepsis.     Discussed L ureteroscopic stone removal compared to Left PCNL. Discussed risks, benefits. Discussed PCNL would likely render her stone free in one procedure, due to lower pole location of her stones, more than 1 procedure may be needed to render her stone free.   Urine culture   Patient will call to arrange follow up       Urinary urgency  What is known about OAB was discussed with the patient including the benefits versus risks/burdens of the available treatment alternatives and the fact that acceptable symptom control may require trials of multiple therapeutic options before it is achieved. Discussed with patient OAB is a symptom complex that is not a life-threatening condition, acknowledged it can impair quality of life for many patients. OAB treatment plan reviewed with patient    Discussed and recommended first Line therapy:   -Behavioral modification- pelvic floor retraining exercises ( which can be successful in up to 75% of patients) , limit known bladder irritants such as caffeine, soda, alcohol, use of a voiding diary to determine frequency of events.   Minimize dietary irritants  Avoid known bladder irritants:   Coffee - Regular & Decaf  Tea - caffeinated  Carbonated beverages - cola, non-dewayne, diet & caffeine-free  Alcohols - Beer, Red  Wine, White Wine, Champagne  Fruits - Grapefruit, Lemon, Orange, Pineapple  Fruit Juices - Cranberry, Grapefruit, Orange, Pineapple  Vegetables - Tomato & Tomato Products  Flavor Enhancers - Hot peppers, Spicy foods, Chili, Horseradish, Vinegar, Monosodium glutamate (MSG)  Artificial Sweeteners - NutraSweet, Sweet N Low, Equal (sweetener), Saccharin      Discussed Second-Line Treatments: Pharmacologic Management  Discussed use of oral anti-muscarinics and oral ?3-adrenoceptor agonists. Antimuscarinics are associated with increased risk of dementia with long term usage, contraindicated in patients with history of narrow angle glaucoma, delayed gastric emptying. Discussed mirabegron can be associated with increased BP, monitoring at home is advised. Discussed virbegron may be costly compared to other agents, but does not have the same risk of elevated BP, need for BP monitoring as mirabegron  Rx provided for gemtesa  Stop detrol as it can worsen constipation    Constipation  Patient to follow up with PCP

## 2024-01-16 DIAGNOSIS — N20.0 KIDNEY STONES: Primary | ICD-10-CM

## 2024-01-18 ENCOUNTER — TELEPHONE (OUTPATIENT)
Dept: UROLOGY | Facility: CLINIC | Age: 64
End: 2024-01-18
Payer: COMMERCIAL

## 2024-01-18 NOTE — TELEPHONE ENCOUNTER
Appointment scheduled already per below.  This is a rescheduled from an earlier date.  Thank you.    CURRY ENRIQUEZ MRN: [id number]    Date: 2/20/2024 Status: Yaneli   Appt Time: 3:15 PM Length: 15   Visit Type: EST PATIENT - UROLOGY ([facility]) [573] Copay: $50.00   Provider: Herber Kan MD       ----- Message from Ronit Rosado sent at 1/18/2024 10:10 AM CST -----  Contact: pt/[phone]  Type:  Patient Call    Who Called: Pt    Would the patient rather a call back or a response via [facility]? Call   Best Call Back Number:[phone]  Additional Information: pt  has  a  referral  and is  requesting to  schedule  with lelo Kan

## 2024-02-07 ENCOUNTER — OFFICE VISIT (OUTPATIENT)
Dept: FAMILY MEDICINE | Facility: CLINIC | Age: 64
End: 2024-02-07
Payer: COMMERCIAL

## 2024-02-07 VITALS
DIASTOLIC BLOOD PRESSURE: 72 MMHG | TEMPERATURE: 98 F | WEIGHT: 187.38 LBS | OXYGEN SATURATION: 98 % | SYSTOLIC BLOOD PRESSURE: 122 MMHG | HEIGHT: 64 IN | HEART RATE: 79 BPM | BODY MASS INDEX: 31.99 KG/M2

## 2024-02-07 DIAGNOSIS — N20.0 NEPHROLITHIASIS: ICD-10-CM

## 2024-02-07 DIAGNOSIS — K59.09 CHRONIC CONSTIPATION: ICD-10-CM

## 2024-02-07 DIAGNOSIS — Z12.31 ENCOUNTER FOR SCREENING MAMMOGRAM FOR BREAST CANCER: ICD-10-CM

## 2024-02-07 DIAGNOSIS — Z00.00 ROUTINE MEDICAL EXAM: Primary | ICD-10-CM

## 2024-02-07 DIAGNOSIS — J45.20 MILD INTERMITTENT ASTHMA WITHOUT COMPLICATION: ICD-10-CM

## 2024-02-07 DIAGNOSIS — Z12.11 SCREENING FOR COLORECTAL CANCER: ICD-10-CM

## 2024-02-07 DIAGNOSIS — M85.89 OSTEOPENIA OF MULTIPLE SITES: ICD-10-CM

## 2024-02-07 DIAGNOSIS — E03.9 ACQUIRED HYPOTHYROIDISM: ICD-10-CM

## 2024-02-07 DIAGNOSIS — Z85.3 HISTORY OF BREAST CANCER: ICD-10-CM

## 2024-02-07 DIAGNOSIS — Z12.12 SCREENING FOR COLORECTAL CANCER: ICD-10-CM

## 2024-02-07 PROCEDURE — 99999 PR PBB SHADOW E&M-EST. PATIENT-LVL IV: CPT | Mod: PBBFAC,,, | Performed by: INTERNAL MEDICINE

## 2024-02-07 PROCEDURE — 99397 PER PM REEVAL EST PAT 65+ YR: CPT | Mod: S$GLB,,, | Performed by: INTERNAL MEDICINE

## 2024-02-07 PROCEDURE — 1101F PT FALLS ASSESS-DOCD LE1/YR: CPT | Mod: CPTII,S$GLB,, | Performed by: INTERNAL MEDICINE

## 2024-02-07 PROCEDURE — 3074F SYST BP LT 130 MM HG: CPT | Mod: CPTII,S$GLB,, | Performed by: INTERNAL MEDICINE

## 2024-02-07 PROCEDURE — 3078F DIAST BP <80 MM HG: CPT | Mod: CPTII,S$GLB,, | Performed by: INTERNAL MEDICINE

## 2024-02-07 PROCEDURE — 1159F MED LIST DOCD IN RCRD: CPT | Mod: CPTII,S$GLB,, | Performed by: INTERNAL MEDICINE

## 2024-02-07 PROCEDURE — 3288F FALL RISK ASSESSMENT DOCD: CPT | Mod: CPTII,S$GLB,, | Performed by: INTERNAL MEDICINE

## 2024-02-07 PROCEDURE — 3008F BODY MASS INDEX DOCD: CPT | Mod: CPTII,S$GLB,, | Performed by: INTERNAL MEDICINE

## 2024-02-07 PROCEDURE — 1126F AMNT PAIN NOTED NONE PRSNT: CPT | Mod: CPTII,S$GLB,, | Performed by: INTERNAL MEDICINE

## 2024-02-07 RX ORDER — TOLTERODINE 4 MG/1
4 CAPSULE, EXTENDED RELEASE ORAL EVERY MORNING
COMMUNITY
Start: 2024-01-14

## 2024-02-07 NOTE — PROGRESS NOTES
Complaint:  Establish care, physical     Patient is a 63-year-old white female new to me who lives on the [place] actually works at the [employer] across the street from the clinic.  She was referred by her oncologist.  Regarding health maintenance she is up-to-date on breast cancer screening and has a colonoscopy set.  She does have a lifelong chronic constipation and takes MiraLax daily but still only goes to the bathroom every three days.  We discussed increasing MiraLax without limit and also a trial of Linzess, co-pay reduction card discussed as well as dose escalation.  Probably good to get this under control prior to colonoscopy    Colon nl 2013 and set fro 3/22/24  Mammo 11/23  Labs ok 11/23      Discussed at length her recent UTI.  She has no prior history of UTIs.  Looks like she had a culture in October that was resistant to Bactrim.  Not clear if she was given antibiotics and that culture may have been done by gyn.  A month later her oncologist sent a urine which was consistent with a UTI but no culture was run although ordered.  She was treated with Bactrim so that antibiotic may not have been effective.  She then was seen by Urology.  You UTI symptoms currently.  She is concerned about having open removal of the kidney stones and discussed with her to discuss with Urology to make sure this is truly a recurrent UTI issue since she has no prior significant UTI history.  She is also off antibiotics with no current UTI symptoms we discussed watching her urine, home urine dip test and a prompt culture should she get any suspected UTI.  CT reviewed and there was no obstruction of the to 9 mm stones.  I think there was some concern and push for surgical removal given patient's express concern that her mother had obstructing stones and did have to have amputations related to sepsis.  We did openly discuss that any stone can lead to UTI in any UTI can lead to pyelonephritis and sepsis.    HELEN 11/23  The  L1-L4 vertebral bone mineral density is equal to 0.828 g/cm squared with a T score of -2.0.   The right femoral neck bone mineral density is equal to 0.658 g/cm squared with a T score of -1.7.   The mean total hip bone mineral density is equal to 0.808 g/cm squared with a T score of -1.1.   There is a 18.0% risk of a major osteoporotic fracture and a 1.3% risk of hip fracture in the next 10 years (FRAX).   Impression:   1. Severe osteopenia of the lumbar spine.  2. Moderate osteopenia of the right femoral neck.  Consider FDA approved medical therapies in postmenopausal women and men aged 50 years and older, based on the following:   A hip or vertebral (clinical or morphometric) fracture  *T score less than or equal to -2.5 at the femoral neck or spine after appropriate evaluation to exclude secondary causes.  *Low bone mass -- also known as osteopenia (T score between -1.0 and -2.5 at the femoral neck or spine) and a 10 year probability of hip fracture greater than or equal to 3% or a 10 year probability of major osteoporosis-related fracture greater than or equal to 20% based on the [place]-adapted WHO algorithm.  *Clinicians judgment and/or patient preference may indicate treatment for people with 10 year fracture probabilities is above or below these levels.      ROS:   CONST: weight stable. EYES: no vision change. ENT: no sore throat. CV: no chest pain w/ exertion. RESP: no shortness of breath. GI: no nausea, vomiting, diarrhea. No dysphagia. : no urinary issues. MUSCULOSKELETAL: no new myalgias or arthralgias. SKIN: no new changes. NEURO: no focal deficits. PSYCH: no new issues. ENDOCRINE: no polyuria. HEME: no lymph nodes. ALLERGY: no general pruritis.    Past Medical History:   Diagnosis Date    Abnormal Pap smear of cervix 1988    H/O of [facility]      Acquired hypothyroidism 09/19/2017    Asthma     Breast cancer 1992    left breast IDC    Chronic constipation     Food allergy 08/15/2012    Genetic testing 12/2006     BRACAnalysis with rearrangement negative for mutation    Heart murmur     History of breast cancer 11/12/2012    kidney stone     Osteopenia of multiple sites 11/29/2023    Rhinitis, allergic 08/15/2012     Past Surgical History:   Procedure Laterality Date    ANAL FISTULOTOMY      BILATERAL SALPINGOOPHORECTOMY      BREAST BIOPSY Right ~1995    excisional biopsy- benign    BREAST LUMPECTOMY Left 1992     with ALND for IDC    CERVICAL CONIZATION   W/ LASER  1988    CHOLECYSTECTOMY  04/2018    at [facility]    GALLBLADDER SURGERY  04/2018    HYSTERECTOMY  2002    TVH/ BSO       OOPHORECTOMY  2002     Social History     Socioeconomic History    Marital status:      Spouse name: Esvin    Number of children: 2   Occupational History    Occupation: "[employer]     Employer: [employer]   Tobacco Use    Smoking status: Never    Smokeless tobacco: Never   Substance and Sexual Activity    Alcohol use: No     Alcohol/week: 0.0 standard drinks of alcohol    Drug use: No    Sexual activity: Yes     Partners: Male     Birth control/protection: Surgical     Comment: :    TVH/BSO  2002     family history includes Aortic aneurysm in her father; Breast cancer in her maternal aunt, maternal aunt, and paternal aunt; Cancer in her father and maternal aunt; Diabetes in her father; Heart disease in her father; Hypertension in her mother; Lung cancer in her father; Nephrolithiasis in her mother.      Gen: no distress  EYES: conjunctiva clear, non-icteric, PERRL  ENT: nose clear, nasal mucosa normal, oropharynx clear and moist, teeth good  NECK:supple, thyroid non-palpable  RESP: effort is good, lungs clear  CV: heart RRR w/o murmur, gallops or rubs; no carotid bruits, no edema  GI: abdomen soft, non-distended, non-tender, no hepatosplenomegaly  MS: gait normal, no clubbing or cyanosis of the digits  SKIN: no rashes, warm to touch      Diagnoses and all orders for this visit:    Routine medical exam, new to me, up-to-date on breast  and colon cancer screening will be updated soon.  Labs unremarkable    Encounter for screening mammogram for breast cancer    Screening for colorectal cancer    Acquired hypothyroidism, euthyroid    History of breast cancer, follows with Oncology    Mild intermittent asthma without complication, chronic and stable    Nephrolithiasis, recent UTI and resistance pattern may well have caused treatment with three different antibiotics but may well not have been a recurrent issue.  She should discuss with Urology further    Osteopenia of multiple sites, repeat in two years from 11/23    Other orders  -     linaCLOtide (LINZESS) 72 mcg Cap capsule; Take 1 capsule (72 mcg total) by mouth before breakfast.     Chronic constipation, increase MiraLax and try Linzess

## 2024-03-05 ENCOUNTER — TELEPHONE (OUTPATIENT)
Dept: ENDOSCOPY | Facility: HOSPITAL | Age: 64
End: 2024-03-05
Payer: COMMERCIAL

## 2024-03-05 NOTE — TELEPHONE ENCOUNTER
Left voicemail and sent portal message for patient to call Endoscopy Scheduling to review instructions and confirm appointment for Colonoscopy on 3/11/24.     Spontaneous, unlabored and symmetrical

## 2024-03-10 ENCOUNTER — ANESTHESIA EVENT (OUTPATIENT)
Dept: ENDOSCOPY | Facility: HOSPITAL | Age: 64
End: 2024-03-10
Payer: COMMERCIAL

## 2024-03-10 RX ORDER — LIDOCAINE HYDROCHLORIDE 10 MG/ML
1 INJECTION, SOLUTION EPIDURAL; INFILTRATION; INTRACAUDAL; PERINEURAL ONCE
Status: CANCELLED | OUTPATIENT
Start: 2024-03-10 | End: 2024-03-10

## 2024-03-11 ENCOUNTER — HOSPITAL ENCOUNTER (OUTPATIENT)
Facility: HOSPITAL | Age: 64
Discharge: HOME | End: 2024-03-11
Attending: INTERNAL MEDICINE | Admitting: INTERNAL MEDICINE
Payer: COMMERCIAL

## 2024-03-11 ENCOUNTER — ANESTHESIA (OUTPATIENT)
Dept: ENDOSCOPY | Facility: HOSPITAL | Age: 64
End: 2024-03-11
Payer: COMMERCIAL

## 2024-03-11 VITALS
TEMPERATURE: 98 F | OXYGEN SATURATION: 100 % | RESPIRATION RATE: 18 BRPM | HEART RATE: 64 BPM | DIASTOLIC BLOOD PRESSURE: 64 MMHG | SYSTOLIC BLOOD PRESSURE: 141 MMHG

## 2024-03-11 DIAGNOSIS — Z12.11 COLON CANCER SCREENING: ICD-10-CM

## 2024-03-11 PROCEDURE — 63600175 PHARM REV CODE 636 W HCPCS: Performed by: NURSE ANESTHETIST, CERTIFIED REGISTERED

## 2024-03-11 PROCEDURE — 45385 COLONOSCOPY W/LESION REMOVAL: CPT | Mod: PT | Performed by: INTERNAL MEDICINE

## 2024-03-11 PROCEDURE — 27201089 HC SNARE, DISP (ANY): Performed by: INTERNAL MEDICINE

## 2024-03-11 PROCEDURE — 88305 TISSUE EXAM BY PATHOLOGIST: CPT | Mod: 26,,, | Performed by: PATHOLOGY

## 2024-03-11 PROCEDURE — 88305 TISSUE EXAM BY PATHOLOGIST: CPT | Performed by: PATHOLOGY

## 2024-03-11 PROCEDURE — D9220A PRA ANESTHESIA: Mod: 33,ANES,, | Performed by: ANESTHESIOLOGY

## 2024-03-11 PROCEDURE — 45385 COLONOSCOPY W/LESION REMOVAL: CPT | Mod: PT,,, | Performed by: INTERNAL MEDICINE

## 2024-03-11 PROCEDURE — D9220A PRA ANESTHESIA: Mod: 33,CRNA,, | Performed by: NURSE ANESTHETIST, CERTIFIED REGISTERED

## 2024-03-11 PROCEDURE — 37000008 HC ANESTHESIA 1ST 15 MINUTES: Performed by: INTERNAL MEDICINE

## 2024-03-11 PROCEDURE — 25000003 PHARM REV CODE 250: Performed by: NURSE ANESTHETIST, CERTIFIED REGISTERED

## 2024-03-11 PROCEDURE — 37000009 HC ANESTHESIA EA ADD 15 MINS: Performed by: INTERNAL MEDICINE

## 2024-03-11 RX ORDER — SODIUM CHLORIDE 9 MG/ML
INJECTION, SOLUTION INTRAVENOUS CONTINUOUS PRN
Status: DISCONTINUED | OUTPATIENT
Start: 2024-03-11 | End: 2024-03-11

## 2024-03-11 RX ORDER — PROPOFOL 10 MG/ML
VIAL (ML) INTRAVENOUS
Status: DISCONTINUED | OUTPATIENT
Start: 2024-03-11 | End: 2024-03-11

## 2024-03-11 RX ORDER — LIDOCAINE HYDROCHLORIDE 20 MG/ML
INJECTION INTRAVENOUS
Status: DISCONTINUED | OUTPATIENT
Start: 2024-03-11 | End: 2024-03-11

## 2024-03-11 RX ORDER — SODIUM CHLORIDE 9 MG/ML
INJECTION, SOLUTION INTRAVENOUS CONTINUOUS
Status: DISCONTINUED | OUTPATIENT
Start: 2024-03-11 | End: 2024-03-11 | Stop reason: HOSPADM

## 2024-03-11 RX ADMIN — PROPOFOL 40 MG: 10 INJECTION, EMULSION INTRAVENOUS at 08:03

## 2024-03-11 RX ADMIN — SODIUM CHLORIDE: 0.9 INJECTION, SOLUTION INTRAVENOUS at 07:03

## 2024-03-11 RX ADMIN — PROPOFOL 20 MG: 10 INJECTION, EMULSION INTRAVENOUS at 08:03

## 2024-03-11 RX ADMIN — PROPOFOL 80 MG: 10 INJECTION, EMULSION INTRAVENOUS at 08:03

## 2024-03-11 RX ADMIN — PROPOFOL 30 MG: 10 INJECTION, EMULSION INTRAVENOUS at 08:03

## 2024-03-11 RX ADMIN — LIDOCAINE HYDROCHLORIDE 100 MG: 20 INJECTION, SOLUTION INTRAVENOUS at 08:03

## 2024-03-11 NOTE — ANESTHESIA PREPROCEDURE EVALUATION
03/11/2024  Mayra Crawford is a 63 y.o., female.      Pre-op Assessment    I have reviewed the Patient Summary Reports.     I have reviewed the Nursing Notes. I have reviewed the NPO Status.   I have reviewed the Medications.     Review of Systems  Anesthesia Hx:  No problems with previous Anesthesia                Social:  Non-Smoker, No Alcohol Use       Hematology/Oncology:                      Current/Recent Cancer.  --  Cancer in past history:       Breast       chemotherapy, radiation and surgery       EENT/Dental:  EENT/Dental Normal           Cardiovascular:  Cardiovascular Normal                                            Pulmonary:    Asthma                    Renal/:   renal calculi               Neurological:  Neurology Normal                                      Endocrine:   Hypothyroidism          Psych:   anxiety depression                Physical Exam  General: Well nourished, Cooperative, Alert and Oriented    Airway:  Mallampati: II / II  Mouth Opening: Normal  TM Distance: Normal  Tongue: Normal  Neck ROM: Normal ROM    Dental:  Intact    Chest/Lungs:  Clear to auscultation, Normal Respiratory Rate    Heart:  Rate: Normal  Rhythm: Regular Rhythm  Sounds: Normal        Anesthesia Plan  Type of Anesthesia, risks & benefits discussed:    Anesthesia Type: Gen Natural Airway  Intra-op Monitoring Plan: Standard ASA Monitors  Induction:  IV  Informed Consent: Informed consent signed with the Patient and all parties understand the risks and agree with anesthesia plan.  All questions answered.   ASA Score: 2  Day of Surgery Review of History & Physical: H&P Update referred to the surgeon/provider.    Ready For Surgery From Anesthesia Perspective.     .

## 2024-03-11 NOTE — PROVATION PATIENT INSTRUCTIONS
Discharge Summary/Instructions after an Endoscopic Procedure  Patient Name: Mayra Crawford  Patient MRN: [id number]  Patient YOB: 1960 Monday, March 11, 2024  Mary Thorpe MD  Dear patient,  As a result of recent federal legislation (The Federal Cures Act), you may   receive lab or pathology results from your procedure in your [facility]   account before your physician is able to contact you. Your physician or   their representative will relay the results to you with their   recommendations at their soonest availability.  Thank you,  RESTRICTIONS:  During your procedure today, you received medications for sedation.  These   medications may affect your judgment, balance and coordination.  Therefore,   for 24 hours, you have the following restrictions:   - DO NOT drive a car, operate machinery, make legal/financial decisions,   sign important papers or drink alcohol.    ACTIVITY:  Today: no heavy lifting, straining or running due to procedural   sedation/anesthesia.  The following day: return to full activity including work.  DIET:  Eat and drink normally unless instructed otherwise.     TREATMENT FOR COMMON SIDE EFFECTS:  - Mild abdominal pain, nausea, belching, bloating or excessive gas:  rest,   eat lightly and use a heating pad.  - Sore Throat: treat with throat lozenges and/or gargle with warm salt   water.  - Because air was used during the procedure, expelling large amounts of air   from your rectum or belching is normal.  - If a bowel prep was taken, you may not have a bowel movement for 1-3 days.    This is normal.  SYMPTOMS TO WATCH FOR AND REPORT TO YOUR PHYSICIAN:  1. Abdominal pain or bloating, other than gas cramps.  2. Chest pain.  3. Back pain.  4. Signs of infection such as: chills or fever occurring within 24 hours   after the procedure.  5. Rectal bleeding, which would show as bright red, maroon, or black stools.   (A tablespoon of blood from the rectum is not serious, especially if    hemorrhoids are present.)  6. Vomiting.  7. Weakness or dizziness.  GO DIRECTLY TO THE NEAREST EMERGENCY ROOM IF YOU HAVE ANY OF THE FOLLOWING:      Difficulty breathing              Chills and/or fever over 101 F   Persistent vomiting and/or vomiting blood   Severe abdominal pain   Severe chest pain   Black, tarry stools   Bleeding- more than one tablespoon   Any other symptom or condition that you feel may need urgent attention  Your doctor recommends these additional instructions:  If any biopsies were taken, your doctors clinic will contact you in 1 to 2   weeks with any results.  - Discharge patient to home.   - High fiber diet.   - Continue present medications.   - Await pathology results.   - Repeat colonoscopy in 7 years for surveillance.  For questions, problems or results please call your physician - Mary Thorpe MD at Work:  [phone].  [facility] can be reached at [phone]     IF A COMPLICATION OR EMERGENCY SITUATION ARISES AND YOU ARE UNABLE TO REACH   YOUR PHYSICIAN - GO DIRECTLY TO THE EMERGENCY ROOM.  Mary Thorpe MD  3/11/2024 8:34:21 AM  This report has been verified and signed electronically.  Dear patient,  As a result of recent federal legislation (The Federal Cures Act), you may   receive lab or pathology results from your procedure in your [facility]   account before your physician is able to contact you. Your physician or   their representative will relay the results to you with their   recommendations at their soonest availability.  Thank you,  PROVATION

## 2024-03-11 NOTE — TRANSFER OF CARE
Anesthesia Transfer of Care Note    Patient: Mayra Crawford    Procedure(s) Performed: Procedure(s) (LRB):  COLONOSCOPY (N/A)    Patient location: GI    Anesthesia Type: general    Transport from OR: Transported from OR on room air with adequate spontaneous ventilation    Post pain: adequate analgesia    Post assessment: no apparent anesthetic complications    Post vital signs: stable    Level of consciousness: awake    Nausea/Vomiting: no nausea/vomiting    Complications: none    Transfer of care protocol was followed      Last vitals:

## 2024-03-11 NOTE — H&P
Short Stay Endoscopy History and Physical    PCP - Baron Long MD    Procedure - Colonoscopy  ASA - per anesthesia  Mallampati - per anesthesia  History of Anesthesia problems - no  Family history Anesthesia problems - no   Plan of anesthesia - General, MAC    HPI:  This is a 63 y.o. female here for evaluation of : asymptomatic screening exam      ROS:  Constitutional: No fevers, chills, No weight loss  CV: No chest pain  Pulm: No cough, No shortness of breath  GI: see HPI  Derm: No rash    Medical History:  has a past medical history of Abnormal Pap smear of cervix (1988), Acquired hypothyroidism (09/19/2017), Asthma, Breast cancer (1992), Chronic constipation, Food allergy (08/15/2012), Genetic testing (12/2006), Heart murmur, History of breast cancer (11/12/2012), kidney stone, Osteopenia of multiple sites (11/29/2023), and Rhinitis, allergic (08/15/2012).    Surgical History:  has a past surgical history that includes Bilateral salpingoophorectomy; Breast biopsy (Right, ~1995); Cervical conization w/ laser (1988); Breast lumpectomy (Left, 1992); Gallbladder surgery (04/2018); Anal fistulotomy; Oophorectomy (2002); Cholecystectomy (04/2018); and Hysterectomy (2002).    Family History: family history includes Aortic aneurysm in her father; Breast cancer in her maternal aunt, maternal aunt, and paternal aunt; Cancer in her father and maternal aunt; Diabetes in her father; Heart disease in her father; Hypertension in her mother; Lung cancer in her father; Nephrolithiasis in her mother.. Otherwise no colon cancer, inflammatory bowel disease, or GI malignancies.    Social History:  reports that she has never smoked. She has never used smokeless tobacco. She reports that she does not drink alcohol and does not use drugs.    Review of patient's allergies indicates:   Allergen Reactions    Penicillins      Other reaction(s): Rash    Other Rash and Other (See Comments)     Other reaction(s): Swelling  Other  reaction(s): Sneezing  Other reaction(s): Shortness of breath    Pt reports she is allergic to meat       Medications:   Medications Prior to Admission   Medication Sig Dispense Refill Last Dose    cloNIDine (CATAPRES) 0.1 MG tablet TAKE 1 TABLET BY MOUTH EVERY DAY 90 tablet 3 Past Week    albuterol (PROVENTIL/VENTOLIN HFA) 90 mcg/actuation inhaler Inhale 2 puffs into the lungs every 6 (six) hours as needed for Wheezing. 18 g 12     ALPRAZolam (XANAX) 0.5 MG tablet Take 1 tablet (0.5 mg total) by mouth 3 (three) times daily as needed for Anxiety (before flying). 6 tablet 0     CETIRIZINE HCL (ZYRTEC ORAL) daily as needed.        diazePAM (VALIUM) 5 MG tablet TAKE 1 TABLET IN THE EVENING BEFORE APPOINTMENT AND 1 TABLET 1 HOUR BEFORE APPOINTMENT       docusate sodium (STOOL SOFTENER) 50 MG capsule Take by mouth 2 (two) times daily.       epinephrine (EPIPEN) 0.3 mg/0.3 mL (1:1,000) AtIn Inject 0.3 mLs (0.3 mg total) into the muscle once. 1 Pen Injector Intramuscular .  use as directed 0.3 mL 0     fluocinonide 0.05% (LIDEX) 0.05 % cream Apply topically 2 (two) times daily. back 30 g 1     fluticasone-salmeterol 250-50 mcg/dose (ADVAIR DISKUS) 250-50 mcg/dose diskus inhaler Inhale 1 puff into the lungs 2 (two) times daily as needed. 1 Disk with Device Inhalation Twice a day 60 each 12     ibandronate (BONIVA) 150 mg tablet Take 1 tablet (150 mg total) by mouth every 30 days. 3 tablet 3     linaCLOtide (LINZESS) 72 mcg Cap capsule Take 1 capsule (72 mcg total) by mouth before breakfast. 30 capsule 12     meclizine (ANTIVERT) 12.5 mg tablet Take 1 tablet (12.5 mg total) by mouth 3 (three) times daily as needed. 20 tablet 0     mometasone (ASMANEX TWISTHALER) 220 mcg (60 doses) AePB 2 puffs inhaled once daily. Disp one canister 1 g 1     ondansetron (ZOFRAN-ODT) 4 MG TbDL Take 2 tablets (8 mg total) by mouth every 8 (eight) hours as needed. 5 tablet 0     paroxetine (PAXIL) 20 MG tablet TAKE 1 TABLET BY MOUTH EVERY DAY IN  THE MORNING 90 tablet 3     sertraline (ZOLOFT) 50 MG tablet TAKE 1 TABLET BY MOUTH EVERY DAY 90 tablet 3     sod sulf-pot chloride-mag sulf (SUTAB) 1.479-0.188- 0.225 gram tablet Take 12 tablets by mouth once daily. BIN: [id number]  PCN: TAYLOR  GROUP: [id number] MEMBER ID: [id number] 24 tablet 0     sulfamethoxazole-trimethoprim 800-160mg (BACTRIM DS) 800-160 mg Tab Take 1 tablet by mouth 2 (two) times daily. 10 tablet 0     tolterodine (DETROL LA) 4 MG 24 hr capsule Take 4 mg by mouth every morning.       tretinoin (RETIN-A) 0.025 % cream Apply topically every evening. 45 g 1     vibegron 75 mg Tab Take 75 mg by mouth once daily. 90 tablet 0          Physical Exam:    Vital Signs:   Vitals:    03/11/24 0732   BP: (!) 171/85   Pulse: (!) 58   Resp: 16   Temp: 97.8 °F (36.6 °C)       Gen: NAD, lying comfortably  HENT: NCAT, oropharynx clear  Eyes: anicteric sclerae, EOMI grossly  Neck: supple, no visible masses/goiter  Cardiac: RRR  Lungs: non-labored breathing  Abd: soft, NT/ND, normoactive BS  Ext: no LE edema, warm, well perfused  Skin: skin intact on exposed body parts, no visible rashes, lesions  Neuro: A&Ox4, neuro exam grossly intact, moves all extremities  Psych: appropriate mood, affect        Labs:  Lab Results   Component Value Date    WBC 4.42 11/21/2023    HGB 12.5 11/21/2023    HCT 36.8 (L) 11/21/2023     (L) 11/21/2023    CHOL 184 11/21/2023    TRIG 47 11/21/2023    HDL 57 11/21/2023    ALT 15 11/21/2023    AST 16 11/21/2023     11/21/2023    K 4.7 11/21/2023     11/21/2023    CREATININE 0.8 11/21/2023    BUN 11 11/21/2023    CO2 26 11/21/2023    TSH 3.522 11/21/2023    INR 0.9 12/08/2014       Plan:  Colonoscopy     I have explained the risks and benefits of endoscopy procedures to the patient including but not limited to bleeding, perforation, infection, and death.  The patient was asked if they understand and allowed to ask any further questions to their satisfaction.    Mary Thorpe,  MD

## 2024-03-11 NOTE — ANESTHESIA POSTPROCEDURE EVALUATION
Anesthesia Post Evaluation    Patient: Mayra Crawfrod    Procedure(s) Performed: Procedure(s) (LRB):  COLONOSCOPY (N/A)    Final Anesthesia Type: general      Patient location during evaluation: PACU  Patient participation: Yes- Able to Participate  Level of consciousness: awake and alert  Post-procedure vital signs: reviewed and stable  Pain management: adequate  Airway patency: patent    PONV status at discharge: No PONV  Anesthetic complications: no      Respiratory status: spontaneous ventilation and room air  Hydration status: euvolemic  Follow-up not needed.              Vitals Value Taken Time   /64 03/11/24 0901   Temp 36.5 °C (97.7 °F) 03/11/24 0830   Pulse 62 03/11/24 0908   Resp 31 03/11/24 0908   SpO2 99 % 03/11/24 0908   Vitals shown include unvalidated device data.      Event Time   Out of Recovery 09:00:00         Pain/Antonella Score: Antonella Score: 9 (3/11/2024  9:00 AM)

## 2024-03-12 NOTE — H&P (VIEW-ONLY)
Patient ID: Mayra Crawford is a 63 y.o. female.    Chief Complaint:  Kidney stone 2nd opinion    HPI  Patient is a 63 y.o. female who is new to our clinic but established [facility] and referred by their urologist, Dr. Marino for evaluation of kidney stones.     Patient initially evaluated for recurrent UTIS. Found to have two renal stones, left sided.   Currently asymptomatic.  Had one positive urine culture in 10/2023.     Family member with hx of sepsis 2/2 obstructing stone resulting in amputations    Medically necessary ROS documented in HPI    Past Medical History  Active Ambulatory Problems     Diagnosis Date Noted    Rhinitis, allergic 08/15/2012    Food allergy 08/15/2012    History of breast cancer 11/12/2012    Nephrolithiasis 11/12/2012    Colon cancer screening 10/18/2013    Subcutaneous nodule 06/11/2014    Mild intermittent asthma without complication 09/12/2016    Acquired hypothyroidism 09/19/2017    Osteopenia of multiple sites 11/29/2023     Resolved Ambulatory Problems     Diagnosis Date Noted    Unspecified asthma(493.90) 08/15/2012    Dysuria 11/10/2012    Nausea 11/10/2012    Fever 11/14/2012    CMV (cytomegalovirus infection) 12/03/2012    Rib pain on right side 08/20/2013    Non-cardiac chest pain 01/26/2015    Upper abdominal pain 01/24/2018    Gallstones 03/15/2018     Past Medical History:   Diagnosis Date    Abnormal Pap smear of cervix 1988    Asthma     Breast cancer 1992    Chronic constipation     Genetic testing 12/2006    Heart murmur     kidney stone          Past Surgical History  Past Surgical History:   Procedure Laterality Date    ANAL FISTULOTOMY      BILATERAL SALPINGOOPHORECTOMY      BREAST BIOPSY Right ~1995    excisional biopsy- benign    BREAST LUMPECTOMY Left 1992     with ALND for IDC    CERVICAL CONIZATION   W/ LASER  1988    CHOLECYSTECTOMY  04/2018    at [facility]    COLONOSCOPY N/A 3/11/2024    Procedure: COLONOSCOPY;  Surgeon: Mary Thorpe MD;  Location:  [facility] ENDO;  Service: Endoscopy;  Laterality: N/A;  11/9 ref by Lawson Summers NP, sutab, instr. to portal-st  3/5- lvm and portal msg for pc. DBM  3/7- pc complete with . DBM    GALLBLADDER SURGERY  04/2018    HYSTERECTOMY  2002    TVH/ BSO       OOPHORECTOMY  2002       Social History  Social Connections: Not on file       Medications    Current Outpatient Medications:     albuterol (PROVENTIL/VENTOLIN HFA) 90 mcg/actuation inhaler, Inhale 2 puffs into the lungs every 6 (six) hours as needed for Wheezing., Disp: 18 g, Rfl: 12    ALPRAZolam (XANAX) 0.5 MG tablet, Take 1 tablet (0.5 mg total) by mouth 3 (three) times daily as needed for Anxiety (before flying)., Disp: 6 tablet, Rfl: 0    CETIRIZINE HCL (ZYRTEC ORAL), daily as needed. , Disp: , Rfl:     cloNIDine (CATAPRES) 0.1 MG tablet, TAKE 1 TABLET BY MOUTH EVERY DAY, Disp: 90 tablet, Rfl: 3    diazePAM (VALIUM) 5 MG tablet, TAKE 1 TABLET IN THE EVENING BEFORE APPOINTMENT AND 1 TABLET 1 HOUR BEFORE APPOINTMENT, Disp: , Rfl:     docusate sodium (STOOL SOFTENER) 50 MG capsule, Take by mouth 2 (two) times daily., Disp: , Rfl:     epinephrine (EPIPEN) 0.3 mg/0.3 mL (1:1,000) AtIn, Inject 0.3 mLs (0.3 mg total) into the muscle once. 1 Pen Injector Intramuscular .  use as directed, Disp: 0.3 mL, Rfl: 0    fluocinonide 0.05% (LIDEX) 0.05 % cream, Apply topically 2 (two) times daily. back, Disp: 30 g, Rfl: 1    fluticasone-salmeterol 250-50 mcg/dose (ADVAIR DISKUS) 250-50 mcg/dose diskus inhaler, Inhale 1 puff into the lungs 2 (two) times daily as needed. 1 Disk with Device Inhalation Twice a day, Disp: 60 each, Rfl: 12    ibandronate (BONIVA) 150 mg tablet, Take 1 tablet (150 mg total) by mouth every 30 days., Disp: 3 tablet, Rfl: 3    linaCLOtide (LINZESS) 72 mcg Cap capsule, Take 1 capsule (72 mcg total) by mouth before breakfast., Disp: 30 capsule, Rfl: 12    meclizine (ANTIVERT) 12.5 mg tablet, Take 1 tablet (12.5 mg total) by mouth 3 (three) times daily as  needed., Disp: 20 tablet, Rfl: 0    mometasone (ASMANEX TWISTHALER) 220 mcg (60 doses) AePB, 2 puffs inhaled once daily. Disp one canister, Disp: 1 g, Rfl: 1    ondansetron (ZOFRAN-ODT) 4 MG TbDL, Take 2 tablets (8 mg total) by mouth every 8 (eight) hours as needed., Disp: 5 tablet, Rfl: 0    paroxetine (PAXIL) 20 MG tablet, TAKE 1 TABLET BY MOUTH EVERY DAY IN THE MORNING, Disp: 90 tablet, Rfl: 3    sertraline (ZOLOFT) 50 MG tablet, TAKE 1 TABLET BY MOUTH EVERY DAY, Disp: 90 tablet, Rfl: 3    sod sulf-pot chloride-mag sulf (SUTAB) 1.479-0.188- 0.225 gram tablet, Take 12 tablets by mouth once daily. BIN: [id number]  PCN: TAYLOR  GROUP: [id number] MEMBER ID: [id number], Disp: 24 tablet, Rfl: 0    sulfamethoxazole-trimethoprim 800-160mg (BACTRIM DS) 800-160 mg Tab, Take 1 tablet by mouth 2 (two) times daily., Disp: 10 tablet, Rfl: 0    tolterodine (DETROL LA) 4 MG 24 hr capsule, Take 4 mg by mouth every morning., Disp: , Rfl:     tretinoin (RETIN-A) 0.025 % cream, Apply topically every evening., Disp: 45 g, Rfl: 1    vibegron 75 mg Tab, Take 75 mg by mouth once daily., Disp: 90 tablet, Rfl: 0    Allergies  Review of patient's allergies indicates:   Allergen Reactions    Penicillins      Other reaction(s): Rash    Other Rash and Other (See Comments)     Other reaction(s): Swelling  Other reaction(s): Sneezing  Other reaction(s): Shortness of breath    Pt reports she is allergic to meat       Patient's PMH, FH, PSH, Social hx, Medications, allergies reviewed and updated as pertinent to today's visit.  Objective:      Physical Exam  Constitutional:       Appearance: She is well-developed.   HENT:      Head: Normocephalic and atraumatic.   Eyes:      Conjunctiva/sclera: Conjunctivae normal.   Pulmonary:      Effort: Pulmonary effort is normal. No respiratory distress.   Abdominal:      General: Abdomen is flat.   Skin:     Findings: No rash.   Neurological:      Mental Status: She is alert and oriented to person, place, and time.    Psychiatric:         Behavior: Behavior normal.           Imaging results: personally reviewed CT renal stone  2 x 0.9cm stones noted in Left lower pole    Assessment:       1. Left nephrolithiasis    2. Recurrent UTI    3. Urinary urgency          Plan:           Left renal stones  General risk factors for kidney stones and the conservative measures to prevent kidney stones in the future were discussed with the patient in detail.  The patient was encouraged to drink 2-3 liters of water a day, limit iced tea and dewayne as well as foods high in oxalate.  They were cautioned to try to limit salt and red meat intake.  We also discussed adding citrate to the diet with the addition of ginette or lemon juice to their water or alternatively with crystal light.     We discussed at length today the indications for surgical stone management including but not limited to compromised renal function, infection, intractable pain or other symptoms such as nausea and vomiting, and prolonged renal obstruction.  We discussed the different management options including shockwave lithotripsy, ureteroscopy, and PCNL.  I explained that typically PCNL is reserved for larger stone burdens or special anatomic circumstances such as a calyceal diverticulum or prior urinary diversions.  We discussed ureteral stents and the rationale behind their placement.  We discussed that often shockwave lithotripsy comes with the benefit of not placing a ureteral stent compared to ureteroscopy, although we did discuss that ureteroscopy without a stent is a potential option in some cases.  We discussed side effects and potential complications of ureteral stent placement.  The patient voiced their broad understanding of the treatment for kidney stones.    -I have explained the indication, risks, benefits, and alternatives of the procedure in detail.  Risks including but not limited to bleeding, infection, injury to the urethra, bladder, ureter, need for  additional treatments, inability or incomplete removal of kidney stones, pain, and discomfort related to the stent were discussed in depth with the patient.  The patient voices understanding and all questions have been answered.  The patient agrees to proceed as planned with left ureteroscopy, laser lithotripsy, and ureteral stent placement. Plan for 3/28.       -urine dip today negative.   -will send urine culture .

## 2024-03-12 NOTE — PROGRESS NOTES
Patient ID: Mayra Crawford is a 63 y.o. female.    Chief Complaint:  Kidney stone 2nd opinion    HPI  Patient is a 63 y.o. female who is new to our clinic but established [facility] and referred by their urologist, Dr. Marino for evaluation of kidney stones.     Patient initially evaluated for recurrent UTIS. Found to have two renal stones, left sided.   Currently asymptomatic.  Had one positive urine culture in 10/2023.     Family member with hx of sepsis 2/2 obstructing stone resulting in amputations    Medically necessary ROS documented in HPI    Past Medical History  Active Ambulatory Problems     Diagnosis Date Noted    Rhinitis, allergic 08/15/2012    Food allergy 08/15/2012    History of breast cancer 11/12/2012    Nephrolithiasis 11/12/2012    Colon cancer screening 10/18/2013    Subcutaneous nodule 06/11/2014    Mild intermittent asthma without complication 09/12/2016    Acquired hypothyroidism 09/19/2017    Osteopenia of multiple sites 11/29/2023     Resolved Ambulatory Problems     Diagnosis Date Noted    Unspecified asthma(493.90) 08/15/2012    Dysuria 11/10/2012    Nausea 11/10/2012    Fever 11/14/2012    CMV (cytomegalovirus infection) 12/03/2012    Rib pain on right side 08/20/2013    Non-cardiac chest pain 01/26/2015    Upper abdominal pain 01/24/2018    Gallstones 03/15/2018     Past Medical History:   Diagnosis Date    Abnormal Pap smear of cervix 1988    Asthma     Breast cancer 1992    Chronic constipation     Genetic testing 12/2006    Heart murmur     kidney stone          Past Surgical History  Past Surgical History:   Procedure Laterality Date    ANAL FISTULOTOMY      BILATERAL SALPINGOOPHORECTOMY      BREAST BIOPSY Right ~1995    excisional biopsy- benign    BREAST LUMPECTOMY Left 1992     with ALND for IDC    CERVICAL CONIZATION   W/ LASER  1988    CHOLECYSTECTOMY  04/2018    at [facility]    COLONOSCOPY N/A 3/11/2024    Procedure: COLONOSCOPY;  Surgeon: Mary Thorpe MD;  Location:  [facility] ENDO;  Service: Endoscopy;  Laterality: N/A;  11/9 ref by Lawson Summers NP, sutab, instr. to portal-st  3/5- lvm and portal msg for pc. DBM  3/7- pc complete with . DBM    GALLBLADDER SURGERY  04/2018    HYSTERECTOMY  2002    TVH/ BSO       OOPHORECTOMY  2002       Social History  Social Connections: Not on file       Medications    Current Outpatient Medications:     albuterol (PROVENTIL/VENTOLIN HFA) 90 mcg/actuation inhaler, Inhale 2 puffs into the lungs every 6 (six) hours as needed for Wheezing., Disp: 18 g, Rfl: 12    ALPRAZolam (XANAX) 0.5 MG tablet, Take 1 tablet (0.5 mg total) by mouth 3 (three) times daily as needed for Anxiety (before flying)., Disp: 6 tablet, Rfl: 0    CETIRIZINE HCL (ZYRTEC ORAL), daily as needed. , Disp: , Rfl:     cloNIDine (CATAPRES) 0.1 MG tablet, TAKE 1 TABLET BY MOUTH EVERY DAY, Disp: 90 tablet, Rfl: 3    diazePAM (VALIUM) 5 MG tablet, TAKE 1 TABLET IN THE EVENING BEFORE APPOINTMENT AND 1 TABLET 1 HOUR BEFORE APPOINTMENT, Disp: , Rfl:     docusate sodium (STOOL SOFTENER) 50 MG capsule, Take by mouth 2 (two) times daily., Disp: , Rfl:     epinephrine (EPIPEN) 0.3 mg/0.3 mL (1:1,000) AtIn, Inject 0.3 mLs (0.3 mg total) into the muscle once. 1 Pen Injector Intramuscular .  use as directed, Disp: 0.3 mL, Rfl: 0    fluocinonide 0.05% (LIDEX) 0.05 % cream, Apply topically 2 (two) times daily. back, Disp: 30 g, Rfl: 1    fluticasone-salmeterol 250-50 mcg/dose (ADVAIR DISKUS) 250-50 mcg/dose diskus inhaler, Inhale 1 puff into the lungs 2 (two) times daily as needed. 1 Disk with Device Inhalation Twice a day, Disp: 60 each, Rfl: 12    ibandronate (BONIVA) 150 mg tablet, Take 1 tablet (150 mg total) by mouth every 30 days., Disp: 3 tablet, Rfl: 3    linaCLOtide (LINZESS) 72 mcg Cap capsule, Take 1 capsule (72 mcg total) by mouth before breakfast., Disp: 30 capsule, Rfl: 12    meclizine (ANTIVERT) 12.5 mg tablet, Take 1 tablet (12.5 mg total) by mouth 3 (three) times daily as  needed., Disp: 20 tablet, Rfl: 0    mometasone (ASMANEX TWISTHALER) 220 mcg (60 doses) AePB, 2 puffs inhaled once daily. Disp one canister, Disp: 1 g, Rfl: 1    ondansetron (ZOFRAN-ODT) 4 MG TbDL, Take 2 tablets (8 mg total) by mouth every 8 (eight) hours as needed., Disp: 5 tablet, Rfl: 0    paroxetine (PAXIL) 20 MG tablet, TAKE 1 TABLET BY MOUTH EVERY DAY IN THE MORNING, Disp: 90 tablet, Rfl: 3    sertraline (ZOLOFT) 50 MG tablet, TAKE 1 TABLET BY MOUTH EVERY DAY, Disp: 90 tablet, Rfl: 3    sod sulf-pot chloride-mag sulf (SUTAB) 1.479-0.188- 0.225 gram tablet, Take 12 tablets by mouth once daily. BIN: [id number]  PCN: TAYLOR  GROUP: [id number] MEMBER ID: [id number], Disp: 24 tablet, Rfl: 0    sulfamethoxazole-trimethoprim 800-160mg (BACTRIM DS) 800-160 mg Tab, Take 1 tablet by mouth 2 (two) times daily., Disp: 10 tablet, Rfl: 0    tolterodine (DETROL LA) 4 MG 24 hr capsule, Take 4 mg by mouth every morning., Disp: , Rfl:     tretinoin (RETIN-A) 0.025 % cream, Apply topically every evening., Disp: 45 g, Rfl: 1    vibegron 75 mg Tab, Take 75 mg by mouth once daily., Disp: 90 tablet, Rfl: 0    Allergies  Review of patient's allergies indicates:   Allergen Reactions    Penicillins      Other reaction(s): Rash    Other Rash and Other (See Comments)     Other reaction(s): Swelling  Other reaction(s): Sneezing  Other reaction(s): Shortness of breath    Pt reports she is allergic to meat       Patient's PMH, FH, PSH, Social hx, Medications, allergies reviewed and updated as pertinent to today's visit.  Objective:      Physical Exam  Constitutional:       Appearance: She is well-developed.   HENT:      Head: Normocephalic and atraumatic.   Eyes:      Conjunctiva/sclera: Conjunctivae normal.   Pulmonary:      Effort: Pulmonary effort is normal. No respiratory distress.   Abdominal:      General: Abdomen is flat.   Skin:     Findings: No rash.   Neurological:      Mental Status: She is alert and oriented to person, place, and time.    Psychiatric:         Behavior: Behavior normal.           Imaging results: personally reviewed CT renal stone  2 x 0.9cm stones noted in Left lower pole    Assessment:       1. Left nephrolithiasis    2. Recurrent UTI    3. Urinary urgency          Plan:           Left renal stones  General risk factors for kidney stones and the conservative measures to prevent kidney stones in the future were discussed with the patient in detail.  The patient was encouraged to drink 2-3 liters of water a day, limit iced tea and dewayne as well as foods high in oxalate.  They were cautioned to try to limit salt and red meat intake.  We also discussed adding citrate to the diet with the addition of ginette or lemon juice to their water or alternatively with crystal light.     We discussed at length today the indications for surgical stone management including but not limited to compromised renal function, infection, intractable pain or other symptoms such as nausea and vomiting, and prolonged renal obstruction.  We discussed the different management options including shockwave lithotripsy, ureteroscopy, and PCNL.  I explained that typically PCNL is reserved for larger stone burdens or special anatomic circumstances such as a calyceal diverticulum or prior urinary diversions.  We discussed ureteral stents and the rationale behind their placement.  We discussed that often shockwave lithotripsy comes with the benefit of not placing a ureteral stent compared to ureteroscopy, although we did discuss that ureteroscopy without a stent is a potential option in some cases.  We discussed side effects and potential complications of ureteral stent placement.  The patient voiced their broad understanding of the treatment for kidney stones.    -I have explained the indication, risks, benefits, and alternatives of the procedure in detail.  Risks including but not limited to bleeding, infection, injury to the urethra, bladder, ureter, need for  additional treatments, inability or incomplete removal of kidney stones, pain, and discomfort related to the stent were discussed in depth with the patient.  The patient voices understanding and all questions have been answered.  The patient agrees to proceed as planned with left ureteroscopy, laser lithotripsy, and ureteral stent placement. Plan for 3/28.       -urine dip today negative.   -will send urine culture .

## 2024-03-13 ENCOUNTER — PATIENT MESSAGE (OUTPATIENT)
Dept: ADMINISTRATIVE | Facility: HOSPITAL | Age: 64
End: 2024-03-13
Payer: COMMERCIAL

## 2024-03-13 ENCOUNTER — OFFICE VISIT (OUTPATIENT)
Dept: UROLOGY | Facility: CLINIC | Age: 64
End: 2024-03-13
Payer: COMMERCIAL

## 2024-03-13 ENCOUNTER — PATIENT OUTREACH (OUTPATIENT)
Dept: ADMINISTRATIVE | Facility: HOSPITAL | Age: 64
End: 2024-03-13
Payer: COMMERCIAL

## 2024-03-13 ENCOUNTER — TELEPHONE (OUTPATIENT)
Dept: PREADMISSION TESTING | Facility: HOSPITAL | Age: 64
End: 2024-03-13

## 2024-03-13 VITALS
WEIGHT: 185.19 LBS | BODY MASS INDEX: 31.62 KG/M2 | HEART RATE: 62 BPM | HEIGHT: 64 IN | SYSTOLIC BLOOD PRESSURE: 137 MMHG | DIASTOLIC BLOOD PRESSURE: 82 MMHG

## 2024-03-13 DIAGNOSIS — N20.0 KIDNEY STONES: ICD-10-CM

## 2024-03-13 DIAGNOSIS — N20.0 LEFT NEPHROLITHIASIS: Primary | ICD-10-CM

## 2024-03-13 DIAGNOSIS — R39.15 URINARY URGENCY: ICD-10-CM

## 2024-03-13 DIAGNOSIS — N39.0 RECURRENT UTI: ICD-10-CM

## 2024-03-13 PROCEDURE — 1160F RVW MEDS BY RX/DR IN RCRD: CPT | Mod: CPTII,S$GLB,, | Performed by: UROLOGY

## 2024-03-13 PROCEDURE — 87088 URINE BACTERIA CULTURE: CPT | Performed by: UROLOGY

## 2024-03-13 PROCEDURE — 99999 PR PBB SHADOW E&M-EST. PATIENT-LVL V: CPT | Mod: PBBFAC,,, | Performed by: UROLOGY

## 2024-03-13 PROCEDURE — 87186 SC STD MICRODIL/AGAR DIL: CPT | Performed by: UROLOGY

## 2024-03-13 PROCEDURE — 87077 CULTURE AEROBIC IDENTIFY: CPT | Performed by: UROLOGY

## 2024-03-13 PROCEDURE — 87086 URINE CULTURE/COLONY COUNT: CPT | Performed by: UROLOGY

## 2024-03-13 PROCEDURE — 1159F MED LIST DOCD IN RCRD: CPT | Mod: CPTII,S$GLB,, | Performed by: UROLOGY

## 2024-03-13 PROCEDURE — 3075F SYST BP GE 130 - 139MM HG: CPT | Mod: CPTII,S$GLB,, | Performed by: UROLOGY

## 2024-03-13 PROCEDURE — 3008F BODY MASS INDEX DOCD: CPT | Mod: CPTII,S$GLB,, | Performed by: UROLOGY

## 2024-03-13 PROCEDURE — 3288F FALL RISK ASSESSMENT DOCD: CPT | Mod: CPTII,S$GLB,, | Performed by: UROLOGY

## 2024-03-13 PROCEDURE — 99214 OFFICE O/P EST MOD 30 MIN: CPT | Mod: S$GLB,,, | Performed by: UROLOGY

## 2024-03-13 PROCEDURE — 1101F PT FALLS ASSESS-DOCD LE1/YR: CPT | Mod: CPTII,S$GLB,, | Performed by: UROLOGY

## 2024-03-13 PROCEDURE — 3079F DIAST BP 80-89 MM HG: CPT | Mod: CPTII,S$GLB,, | Performed by: UROLOGY

## 2024-03-13 PROCEDURE — 1126F AMNT PAIN NOTED NONE PRSNT: CPT | Mod: CPTII,S$GLB,, | Performed by: UROLOGY

## 2024-03-13 NOTE — ANESTHESIA PAT ROS NOTE
03/13/2024  Mayra Crawford is a 63 y.o., female.      Pre-op Assessment    I have reviewed the NPO Status.   I have reviewed the Medications.     Review of Systems  Anesthesia Hx:  No problems with previous Anesthesia   History of prior surgery of interest to airway management or planning:          Denies Family Hx of Anesthesia complications.    Denies Personal Hx of Anesthesia complications.                    Social:  Non-Smoker, No Alcohol Use       Hematology/Oncology:  Hematology Normal                       --  Cancer in past history:       Breast    left   chemotherapy, radiation and surgery       EENT/Dental:  chronic allergic rhinitis           Cardiovascular:  Exercise tolerance: good   Denies Pacemaker.  Denies Hypertension.    Denies CAD.       Denies Angina.          Functional Capacity good / => 4 METS      Hx of Heart Murmur                        Pulmonary:    Asthma   Denies Shortness of breath.   Denies Sleep Apnea.                Renal/:  Chronic Renal Disease renal calculi   Renal Symptoms/Infections/Stones: frequency, incontinence.  Urolithiasis, left kidney           Hepatic/GI:  Hepatic/GI Normal     Denies GERD. Denies Liver Disease.            Musculoskeletal:  Musculoskeletal Normal                Neurological:  Neurology Normal Denies TIA.  Denies CVA.    Denies Seizures.                                Endocrine:  Denies Diabetes. Hypothyroidism    Thyroid Disease           Obesity / BMI > 30  Dermatological:  Skin Normal    Psych:   anxiety depression                     Anesthesia Assessment: Preoperative EQUATION    Planned Procedure: Procedure(s) (LRB):  CYSTOSCOPY (N/A)  URETEROSCOPY (Left)  LITHOTRIPSY, USING LASER (Left)  EXTRACTION - STONE (Left)  PLACEMENT-STENT (Left)  Requested Anesthesia Type:General  Surgeon: Herber Kan MD  Service: Urology  Known or  anticipated Date of Surgery:3/28/2024    Surgeon notes: reviewed    Electronic QUestionnaire Assessment completed via nurse interview with patient.        Triage considerations:     The patient has no apparent active cardiac condition (No unstable coronary Syndrome such as severe unstable angina or recent [<1 month] myocardial infarction, decompensated CHF, severe valvular   disease or significant arrhythmia)    Previous anesthesia records:GETA and No problems  03/11/24 colonoscopy, gen anes, ASA 2  Airway:  Mallampati: II / II  Mouth Opening: Normal  TM Distance: Normal  Tongue: Normal  Neck ROM: Normal ROM    [facility] 3 [facility]   Subspecialty notes: Urology    Other important co-morbidities: Hypothyroid, Obesity, and left nephrolithiasis , asthma, h/o heart murmur, acquired hypothyroidism, chronic constipation, allergic rhinitis, osteopenia multiple sites, h/o left breast cancer s/p lumpectomy/chemo/rad, anxiety, depression, h/o TVH/BSO, h/o anal fistulotomy, h/o abn pap smear of cervix ckc biopsy, UTI a couple months ago     Tests already available:  No recent tests.             Instructions given. (See in Nurse's note)    Optimization:  Anesthesia Preop Clinic Assessment Indicated:     --phone screen done        Plan:    Testing:  BMP, Hematology Profile, and TSH      Navigation: Tests Scheduled.              Results will be tracked by Preop Clinic.

## 2024-03-13 NOTE — TELEPHONE ENCOUNTER
----- Message from Ac Hernandez RN sent at 3/13/2024 12:23 PM CDT -----  03/28 surgery Lucius    Please schedule patient for lab. Thank you.

## 2024-03-14 ENCOUNTER — LAB VISIT (OUTPATIENT)
Dept: LAB | Facility: HOSPITAL | Age: 64
End: 2024-03-14
Attending: ANESTHESIOLOGY
Payer: COMMERCIAL

## 2024-03-14 DIAGNOSIS — Z01.818 PREOP TESTING: ICD-10-CM

## 2024-03-14 DIAGNOSIS — E03.9 ACQUIRED HYPOTHYROIDISM: ICD-10-CM

## 2024-03-14 LAB
ANION GAP SERPL CALC-SCNC: 12 MMOL/L (ref 8–16)
BUN SERPL-MCNC: 17 MG/DL (ref 8–23)
CALCIUM SERPL-MCNC: 10.2 MG/DL (ref 8.7–10.5)
CHLORIDE SERPL-SCNC: 106 MMOL/L (ref 95–110)
CO2 SERPL-SCNC: 25 MMOL/L (ref 23–29)
CREAT SERPL-MCNC: 0.8 MG/DL (ref 0.5–1.4)
ERYTHROCYTE [DISTWIDTH] IN BLOOD BY AUTOMATED COUNT: 14 % (ref 11.5–14.5)
EST. GFR  (NO RACE VARIABLE): >60 ML/MIN/1.73 M^2
FINAL PATHOLOGIC DIAGNOSIS: NORMAL
GLUCOSE SERPL-MCNC: 98 MG/DL (ref 70–110)
GROSS: NORMAL
HCT VFR BLD AUTO: 40.5 % (ref 37–48.5)
HGB BLD-MCNC: 13.5 G/DL (ref 12–16)
Lab: NORMAL
MCH RBC QN AUTO: 31.3 PG (ref 27–31)
MCHC RBC AUTO-ENTMCNC: 33.3 G/DL (ref 32–36)
MCV RBC AUTO: 94 FL (ref 82–98)
PLATELET # BLD AUTO: 161 K/UL (ref 150–450)
PMV BLD AUTO: 12.4 FL (ref 9.2–12.9)
POTASSIUM SERPL-SCNC: 4.1 MMOL/L (ref 3.5–5.1)
RBC # BLD AUTO: 4.32 M/UL (ref 4–5.4)
SODIUM SERPL-SCNC: 143 MMOL/L (ref 136–145)
TSH SERPL DL<=0.005 MIU/L-ACNC: 1.84 UIU/ML (ref 0.4–4)
WBC # BLD AUTO: 5.84 K/UL (ref 3.9–12.7)

## 2024-03-14 PROCEDURE — 84443 ASSAY THYROID STIM HORMONE: CPT | Performed by: ANESTHESIOLOGY

## 2024-03-14 PROCEDURE — 80048 BASIC METABOLIC PNL TOTAL CA: CPT | Performed by: ANESTHESIOLOGY

## 2024-03-14 PROCEDURE — 85027 COMPLETE CBC AUTOMATED: CPT | Performed by: ANESTHESIOLOGY

## 2024-03-14 PROCEDURE — 36415 COLL VENOUS BLD VENIPUNCTURE: CPT | Mod: PO | Performed by: ANESTHESIOLOGY

## 2024-03-14 RX ORDER — CIPROFLOXACIN 500 MG/1
500 TABLET ORAL 2 TIMES DAILY
Qty: 28 TABLET | Refills: 0 | Status: SHIPPED | OUTPATIENT
Start: 2024-03-14 | End: 2024-03-15 | Stop reason: ALTCHOICE

## 2024-03-15 ENCOUNTER — TELEPHONE (OUTPATIENT)
Dept: UROLOGY | Facility: CLINIC | Age: 64
End: 2024-03-15
Payer: COMMERCIAL

## 2024-03-15 LAB — BACTERIA UR CULT: ABNORMAL

## 2024-03-15 RX ORDER — CEFDINIR 300 MG/1
300 CAPSULE ORAL 2 TIMES DAILY
Qty: 28 CAPSULE | Refills: 0 | Status: SHIPPED | OUTPATIENT
Start: 2024-03-15 | End: 2024-03-29

## 2024-03-15 RX ORDER — NITROFURANTOIN 25; 75 MG/1; MG/1
100 CAPSULE ORAL 2 TIMES DAILY
Qty: 28 CAPSULE | Refills: 0 | Status: SHIPPED | OUTPATIENT
Start: 2024-03-15 | End: 2024-03-29

## 2024-03-15 NOTE — TELEPHONE ENCOUNTER
----- Message from Herber Kan MD sent at 3/15/2024  2:44 PM CDT -----  Discontinuing the cipro due to resistance.  Ordered alternative and notified patient.

## 2024-03-25 RX ORDER — GENTAMICIN SULFATE 100 MG/100ML
240 INJECTION, SOLUTION INTRAVENOUS
Status: CANCELLED | OUTPATIENT
Start: 2024-03-25

## 2024-03-27 ENCOUNTER — TELEPHONE (OUTPATIENT)
Dept: UROLOGY | Facility: CLINIC | Age: 64
End: 2024-03-27
Payer: COMMERCIAL

## 2024-03-27 NOTE — TELEPHONE ENCOUNTER
Spoke with patient's  who was able to provide acceptable patient identifiers prior to start of conversation.     Instructions for [facility] [place]      Report To:    The [facility] [place], located across from [place] side of the 1st floor of the hospital    Arrival time: 0645    Please note:     If you are allergic to any medication please let your care team know the day of surgery.  If you have ever had adverse reaction to anesthesia please let your care team know the day of surgery   Please arrange transportation from the hospital, you will not be allowed to drive yourself home from surgery since you have been under sedation or anesthesia   Notify your doctor or care team of any diabetic medications that you take as these may need to be held or adjusted prior to surgery     Before Surgery     You should stop taking any blood thinners for up to 7 days depending on the blood thinner, please let care team know what you are taking so you can be directed when to stop certain medications.    You should hold any aspirin containing products for 7 days    Stop taking any herbal supplements 14 days prior to surgery     Night Before Surgery     Nothing to eat or drink after midnight the night before your surgery  Take medications as instructed the morning of surgery  No alcoholic beverages 24 hours prior to surgery

## 2024-03-28 ENCOUNTER — ANESTHESIA EVENT (OUTPATIENT)
Dept: SURGERY | Facility: HOSPITAL | Age: 64
End: 2024-03-28
Payer: COMMERCIAL

## 2024-03-28 ENCOUNTER — PATIENT MESSAGE (OUTPATIENT)
Dept: UROLOGY | Facility: CLINIC | Age: 64
End: 2024-03-28

## 2024-03-28 ENCOUNTER — ANESTHESIA (OUTPATIENT)
Dept: SURGERY | Facility: HOSPITAL | Age: 64
End: 2024-03-28
Payer: COMMERCIAL

## 2024-03-28 ENCOUNTER — HOSPITAL ENCOUNTER (OUTPATIENT)
Facility: HOSPITAL | Age: 64
Discharge: HOME | End: 2024-03-28
Attending: UROLOGY | Admitting: UROLOGY
Payer: COMMERCIAL

## 2024-03-28 VITALS
WEIGHT: 185 LBS | SYSTOLIC BLOOD PRESSURE: 138 MMHG | RESPIRATION RATE: 15 BRPM | HEIGHT: 64 IN | DIASTOLIC BLOOD PRESSURE: 70 MMHG | HEART RATE: 72 BPM | OXYGEN SATURATION: 97 % | BODY MASS INDEX: 31.58 KG/M2 | TEMPERATURE: 98 F

## 2024-03-28 DIAGNOSIS — E03.9 ACQUIRED HYPOTHYROIDISM: ICD-10-CM

## 2024-03-28 DIAGNOSIS — N20.9 UROLITHIASIS: Primary | ICD-10-CM

## 2024-03-28 DIAGNOSIS — Z01.818 PREOP TESTING: ICD-10-CM

## 2024-03-28 PROCEDURE — 82365 CALCULUS SPECTROSCOPY: CPT | Performed by: UROLOGY

## 2024-03-28 PROCEDURE — C1769 GUIDE WIRE: HCPCS | Performed by: UROLOGY

## 2024-03-28 PROCEDURE — 36000707: Performed by: UROLOGY

## 2024-03-28 PROCEDURE — 25000003 PHARM REV CODE 250: Performed by: STUDENT IN AN ORGANIZED HEALTH CARE EDUCATION/TRAINING PROGRAM

## 2024-03-28 PROCEDURE — D9220A PRA ANESTHESIA: Mod: ANES,,, | Performed by: ANESTHESIOLOGY

## 2024-03-28 PROCEDURE — 63600175 PHARM REV CODE 636 W HCPCS: Performed by: STUDENT IN AN ORGANIZED HEALTH CARE EDUCATION/TRAINING PROGRAM

## 2024-03-28 PROCEDURE — D9220A PRA ANESTHESIA: Mod: CRNA,,, | Performed by: NURSE ANESTHETIST, CERTIFIED REGISTERED

## 2024-03-28 PROCEDURE — 71000044 HC DOSC ROUTINE RECOVERY FIRST HOUR: Performed by: UROLOGY

## 2024-03-28 PROCEDURE — 27201423 OPTIME MED/SURG SUP & DEVICES STERILE SUPPLY: Performed by: UROLOGY

## 2024-03-28 PROCEDURE — 36000706: Performed by: UROLOGY

## 2024-03-28 PROCEDURE — C2617 STENT, NON-COR, TEM W/O DEL: HCPCS | Performed by: UROLOGY

## 2024-03-28 PROCEDURE — 71000015 HC POSTOP RECOV 1ST HR: Performed by: UROLOGY

## 2024-03-28 PROCEDURE — 63600175 PHARM REV CODE 636 W HCPCS: Performed by: NURSE ANESTHETIST, CERTIFIED REGISTERED

## 2024-03-28 PROCEDURE — 37000009 HC ANESTHESIA EA ADD 15 MINS: Performed by: UROLOGY

## 2024-03-28 PROCEDURE — 37000008 HC ANESTHESIA 1ST 15 MINUTES: Performed by: UROLOGY

## 2024-03-28 PROCEDURE — 52356 CYSTO/URETERO W/LITHOTRIPSY: CPT | Mod: LT,,, | Performed by: UROLOGY

## 2024-03-28 PROCEDURE — 25000003 PHARM REV CODE 250: Performed by: NURSE ANESTHETIST, CERTIFIED REGISTERED

## 2024-03-28 PROCEDURE — C1894 INTRO/SHEATH, NON-LASER: HCPCS | Performed by: UROLOGY

## 2024-03-28 DEVICE — STENT URETERAL UNIV 6FR 24CM: Type: IMPLANTABLE DEVICE | Site: URETER | Status: FUNCTIONAL

## 2024-03-28 RX ORDER — ACETAMINOPHEN 10 MG/ML
INJECTION, SOLUTION INTRAVENOUS
Status: DISCONTINUED | OUTPATIENT
Start: 2024-03-28 | End: 2024-03-28

## 2024-03-28 RX ORDER — EPHEDRINE SULFATE 50 MG/ML
INJECTION, SOLUTION INTRAVENOUS
Status: DISCONTINUED | OUTPATIENT
Start: 2024-03-28 | End: 2024-03-28

## 2024-03-28 RX ORDER — PROPOFOL 10 MG/ML
VIAL (ML) INTRAVENOUS
Status: DISCONTINUED | OUTPATIENT
Start: 2024-03-28 | End: 2024-03-28

## 2024-03-28 RX ORDER — ONDANSETRON HYDROCHLORIDE 2 MG/ML
INJECTION, SOLUTION INTRAVENOUS
Status: DISCONTINUED | OUTPATIENT
Start: 2024-03-28 | End: 2024-03-28

## 2024-03-28 RX ORDER — PHENYLEPHRINE HCL IN 0.9% NACL 1 MG/10 ML
SYRINGE (ML) INTRAVENOUS
Status: DISCONTINUED | OUTPATIENT
Start: 2024-03-28 | End: 2024-03-28

## 2024-03-28 RX ORDER — DEXAMETHASONE SODIUM PHOSPHATE 4 MG/ML
INJECTION, SOLUTION INTRA-ARTICULAR; INTRALESIONAL; INTRAMUSCULAR; INTRAVENOUS; SOFT TISSUE
Status: DISCONTINUED | OUTPATIENT
Start: 2024-03-28 | End: 2024-03-28

## 2024-03-28 RX ORDER — TAMSULOSIN HYDROCHLORIDE 0.4 MG/1
0.4 CAPSULE ORAL NIGHTLY
Qty: 7 CAPSULE | Refills: 0 | Status: SHIPPED | OUTPATIENT
Start: 2024-03-28 | End: 2024-04-04

## 2024-03-28 RX ORDER — MIDAZOLAM HYDROCHLORIDE 1 MG/ML
INJECTION INTRAMUSCULAR; INTRAVENOUS
Status: DISCONTINUED | OUTPATIENT
Start: 2024-03-28 | End: 2024-03-28

## 2024-03-28 RX ORDER — POLYETHYLENE GLYCOL 3350 17 G/17G
17 POWDER, FOR SOLUTION ORAL DAILY
Qty: 510 G | Refills: 0 | Status: SHIPPED | OUTPATIENT
Start: 2024-03-28

## 2024-03-28 RX ORDER — PHENAZOPYRIDINE HYDROCHLORIDE 200 MG/1
200 TABLET, FILM COATED ORAL 3 TIMES DAILY PRN
Qty: 15 TABLET | Refills: 0 | Status: SHIPPED | OUTPATIENT
Start: 2024-03-28 | End: 2024-04-02

## 2024-03-28 RX ORDER — FENTANYL CITRATE 50 UG/ML
INJECTION, SOLUTION INTRAMUSCULAR; INTRAVENOUS
Status: DISCONTINUED | OUTPATIENT
Start: 2024-03-28 | End: 2024-03-28

## 2024-03-28 RX ORDER — LIDOCAINE HYDROCHLORIDE 20 MG/ML
INJECTION, SOLUTION EPIDURAL; INFILTRATION; INTRACAUDAL; PERINEURAL
Status: DISCONTINUED | OUTPATIENT
Start: 2024-03-28 | End: 2024-03-28

## 2024-03-28 RX ORDER — OXYBUTYNIN CHLORIDE 5 MG/1
5 TABLET ORAL 3 TIMES DAILY PRN
Qty: 21 TABLET | Refills: 0 | Status: SHIPPED | OUTPATIENT
Start: 2024-03-28 | End: 2024-04-04

## 2024-03-28 RX ADMIN — Medication 100 MCG: at 09:03

## 2024-03-28 RX ADMIN — MIDAZOLAM 2 MG: 1 INJECTION INTRAMUSCULAR; INTRAVENOUS at 08:03

## 2024-03-28 RX ADMIN — ACETAMINOPHEN 1000 MG: 10 INJECTION, SOLUTION INTRAVENOUS at 09:03

## 2024-03-28 RX ADMIN — SUGAMMADEX 200 MG: 100 INJECTION, SOLUTION INTRAVENOUS at 09:03

## 2024-03-28 RX ADMIN — LIDOCAINE HYDROCHLORIDE 100 MG: 20 INJECTION, SOLUTION EPIDURAL; INFILTRATION; INTRACAUDAL at 08:03

## 2024-03-28 RX ADMIN — SODIUM CHLORIDE: 0.9 INJECTION, SOLUTION INTRAVENOUS at 08:03

## 2024-03-28 RX ADMIN — PROPOFOL 150 MG: 10 INJECTION, EMULSION INTRAVENOUS at 08:03

## 2024-03-28 RX ADMIN — DEXAMETHASONE SODIUM PHOSPHATE 4 MG: 4 INJECTION INTRA-ARTICULAR; INTRALESIONAL; INTRAMUSCULAR; INTRAVENOUS; SOFT TISSUE at 09:03

## 2024-03-28 RX ADMIN — ONDANSETRON 4 MG: 2 INJECTION INTRAMUSCULAR; INTRAVENOUS at 09:03

## 2024-03-28 RX ADMIN — EPHEDRINE SULFATE 15 MG: 50 INJECTION INTRAVENOUS at 09:03

## 2024-03-28 RX ADMIN — CEFTRIAXONE 1 G: 1 INJECTION, POWDER, FOR SOLUTION INTRAMUSCULAR; INTRAVENOUS at 08:03

## 2024-03-28 RX ADMIN — FENTANYL CITRATE 50 MCG: 50 INJECTION INTRAMUSCULAR; INTRAVENOUS at 08:03

## 2024-03-28 NOTE — TRANSFER OF CARE
"Anesthesia Transfer of Care Note    Patient: Mayra Crawford    Procedure(s) Performed: Procedure(s) (LRB):  CYSTOSCOPY (N/A)  URETEROSCOPY (Left)  LITHOTRIPSY, USING LASER (Left)  EXTRACTION - STONE (Left)  PLACEMENT-STENT (Left)  PYELOSCOPY (Left)    Patient location: PACU    Anesthesia Type: general    Transport from OR: Transported from OR on 6-10 L/min O2 by face mask with adequate spontaneous ventilation    Post pain: adequate analgesia    Post assessment: no apparent anesthetic complications and tolerated procedure well    Post vital signs: stable    Level of consciousness: sedated    Nausea/Vomiting: no nausea/vomiting    Complications: none    Transfer of care protocol was followed    Last vitals: Visit Vitals  BP (!) 156/78 (BP Location: Left arm, Patient Position: Lying)   Pulse 71   Temp 36.8 °C (98.2 °F) (Temporal)   Resp 18   Ht 5' 4" (1.626 m)   Wt 83.9 kg (185 lb)   LMP  (LMP Unknown)   SpO2 98%   Breastfeeding No   BMI 31.76 kg/m²     "

## 2024-03-28 NOTE — INTERVAL H&P NOTE
The patient has been examined and the H&P has been reviewed:    I concur with the findings and no changes have occurred since H&P was written.    Surgery risks, benefits and alternative options discussed and understood by patient/family.    Interval h&p reviewed and unchanged from previous encounter. Urine dipped: Negative for all components. Nonconcerning for infection.    The patient has stopped all blood thinners, including aspirin for 7 days.    Patient consented and would like to proceed with the procedure. Left radioopaque marker placed.        Active Hospital Problems    Diagnosis  POA    *Nephrolithiasis [N20.0]  Yes      Resolved Hospital Problems   No resolved problems to display.

## 2024-03-28 NOTE — DISCHARGE INSTRUCTIONS
Post Ureteroscopy Instructions  Do not strain to have a bowel movement  No strenuous exercise x 7 days  No driving while you are on narcotic pain medications or if you have a Walters catheter    You can expect:  To pass stone fragments if you had a stone procedure  Have pain when you void from your stent if you have a stent in place  See blood in your urine if you have a stent in place    If you have a catheter, please return to the ER if your catheter stops draining or you are having abdominal pain.    Call the doctor if:  Temperature is greater than 101F  Persistent vomiting and inability to keep food down  Inability to void if you do not have a catheter    You may remove your stent with strings on Tuesday, 4/2/2024.  You will follow up with Dr. Kan  in 3 months with a KUB and renal ultrasound prior.    Take Flomax (Tamsulosin) 0.4 mg once per day while your stent is in place.  You may take pyridium 200 mg up to 3 times per day for bladder irritation from the stent.  You may take Ditropan 5 mg up to 3 times per day for bladder spasms with stent.    If you have any questions or concerns during normal business hours, please call the urology clinic of your surgery.  If you are having an emergency after 5 pm or weekends, you may call [phone] and ask them to page the urology resident on call.

## 2024-03-28 NOTE — OP NOTE
[facility] - Surgery ([facility])  [facility]  Operative Note    Date: 03/28/2024    Pre-Op Diagnosis: Two left lower pole renal stones    Patient Active Problem List    Diagnosis Date Noted    Osteopenia of multiple sites 11/29/2023    Acquired hypothyroidism 09/19/2017    Mild intermittent asthma without complication 09/12/2016    Subcutaneous nodule 06/11/2014    Colon cancer screening 10/18/2013    History of breast cancer 11/12/2012    Nephrolithiasis 11/12/2012    Rhinitis, allergic 08/15/2012    Food allergy 08/15/2012       Post-Op Diagnosis: Same    Procedure(s) Performed:   1. Left ureteroscopy with pyeloscopy  2. Laser lithotripsy  3. Stone basket extraction  4. Cystoscopy  5. Left ureteral stent placement    Specimen(s):  Left renal stones    Staff Surgeon: Herber Kan MD    Assistant Surgeon: Enoch Brian MD (TA) ; Burke Carrero MD    Circulator: Tanvi Cadet RN; Teresa Owen RN; Mandi Dorado RN     Anesthesia: General LMA anesthesia    Indications: Mayra Crawford is a 63 y.o. female with a left lower pole stone x2 presenting for definitive stone management. She is not pre-stented.    Findings:  1. Stone radioopaque within left lower pole on  film.  2. Three small stones within left lower pole, dusted with laser and fragments extracted  3. Left stent with strings placement    Laser Fiber - thulium  Fiber Diameter - 272    Estimated Blood Loss: Minimal    Drains:   1. Left 6 Fr x 24 cm JJ ureteral stent with strings    Procedure in detail:  After risks, benefits, and possible complications were explained, the patient elected to undergo the procedure and informed consent was obtained. All questions were answered in the andie-operative area. The patient was transferred to the cystoscopy suite and placed in the supine position. SCDs were applied and working. Anesthesia was administered. The patient was then placed in the dorsal lithotomy position and prepped and  draped in the usual sterile fashion. Time out was performed, and andie-procedural antibiotics were confirmed.     The stone was Radioopaque within left lower pole on  film . A rigid cystoscope in a 22 Fr sheath was introduced into the patient's urethra. This passed easily. The entire urethra was visualized which showed no strictures or masses. Cystoscopy revealed the ureteral orifices in the normal anatomic location bilaterally. There were no bladder tumors, no bladder stones and no diverticula seen.    A motion wire was passed up the left ureteral orifice and up into the kidney. This passed easily and placement was confirmed fluoroscopically. A second stiff glide wire was inserted through the cystoscope and into the kidney with fluoroscopic confirmation. The cystoscope was removed, keeping the wire in place.    A 10/12 Fr 35 cm ureteral access sheath was advanced over the stiff glide wire to the level of the renal pelvis under continuous fluoroscopy after passive dilation with a pass of the inner dilator. This passed easily. The inner dilator and wire were removed, keeping the outer sheath in place. An 8 Fr flexible ureteroscope was then advanced through the access sheath and into the kidney.    Formal pyeloscopy was performed and three small stones were encountered in the left lower pole. The laser fiber was inserted per the flexible ureteroscope and the stones were dusted. An Ncircle basket was inserted per the scope. Stone fragments were then removed and passed off the sterile field for analysis. Systematic pyeloscopy was repeated and all remaining stone fragments were deemed small enough to pass spontaneously, <1 mm. The flexible ureteroscope and ureteral access sheath were removed, keeping the motion wire in place.    A 6 Fr x 24 cm JJ ureteral stent with strings was passed over the wire and up into the renal pelvis using fluoro. When the coil appeared to be in good position in the kidney and the  radio-opaque marker of the pusher was at the inferior pubis, the wire was removed under continuous fluoro. Good coils were seen in the kidney and the bladder using fluoro.    The patient tolerated the procedure well and was transferred to the recovery room in stable condition.    Disposition:  The patient will be discharged home from PACU. She follow up with Dr. Kan  in 3 months with a KUB and renal ultrasound prior. She was given written instructions to self-remove her ureteral stent with strings on Tuesday, 4/2/2024.    Enoch Brian MD     I have reviewed the operative note performed by Dr. Brian, and I concur with her/his documentation of Mayra Crawford. I was present for the critical or key portions of the procedure.

## 2024-03-28 NOTE — ANESTHESIA PROCEDURE NOTES
Intubation    Date/Time: 3/28/2024 8:56 AM    Performed by: Palak Hussein CRNA  Authorized by: Jose Juan Kent MD    Intubation:     Induction:  Intravenous    Intubated:  Postinduction    Mask Ventilation:  Easy mask    Attempts:  1    Attempted By:  CRNA    Method of Intubation:  Video laryngoscopy    Blade:  Rivera 3    Laryngeal View Grade: Grade I - full view of cords      Difficult Airway Encountered?: No      Complications:  None    Airway Device:  Oral endotracheal tube    Airway Device Size:  7.0    Style/Cuff Inflation:  Cuffed (inflated to minimal occlusive pressure)    Tube secured:  21    Secured at:  The lips    Placement Verified By:  Capnometry    Complicating Factors:  None    Findings Post-Intubation:  BS equal bilateral and atraumatic/condition of teeth unchanged

## 2024-03-28 NOTE — ANESTHESIA PREPROCEDURE EVALUATION
03/28/2024  Mayra Crawford is a 63 y.o., female.  Pre-operative evaluation for Procedure(s) (LRB):  CYSTOSCOPY (N/A)  URETEROSCOPY (Left)  LITHOTRIPSY, USING LASER (Left)  EXTRACTION - STONE (Left)  PLACEMENT-STENT (Left)    Mayra Crawford is a 63 y.o. female with ongoing nephrolithiasis. Otherwise stable aasthma without recent complicationss     LDA:     Prev airway:     Drips:     Patient Active Problem List   Diagnosis    Rhinitis, allergic    Food allergy    History of breast cancer    Nephrolithiasis    Colon cancer screening    Subcutaneous nodule    Mild intermittent asthma without complication    Acquired hypothyroidism    Osteopenia of multiple sites       Review of patient's allergies indicates:   Allergen Reactions    Penicillins      Other reaction(s): Rash    Other Rash and Other (See Comments)     Other reaction(s): Swelling  Other reaction(s): Sneezing  Other reaction(s): Shortness of breath    Pt reports she is allergic to meat        No current facility-administered medications on file prior to encounter.     Current Outpatient Medications on File Prior to Encounter   Medication Sig Dispense Refill    cloNIDine (CATAPRES) 0.1 MG tablet TAKE 1 TABLET BY MOUTH EVERY DAY 90 tablet 3    docusate sodium (STOOL SOFTENER) 50 MG capsule Take by mouth 2 (two) times daily.      linaCLOtide (LINZESS) 72 mcg Cap capsule Take 1 capsule (72 mcg total) by mouth before breakfast. 30 capsule 12    paroxetine (PAXIL) 20 MG tablet TAKE 1 TABLET BY MOUTH EVERY DAY IN THE MORNING 90 tablet 3    tolterodine (DETROL LA) 4 MG 24 hr capsule Take 4 mg by mouth every morning.      albuterol (PROVENTIL/VENTOLIN HFA) 90 mcg/actuation inhaler Inhale 2 puffs into the lungs every 6 (six) hours as needed for Wheezing. 18 g 12    ALPRAZolam (XANAX) 0.5 MG tablet Take 1 tablet (0.5 mg total) by mouth 3 (three) times daily  as needed for Anxiety (before flying). 6 tablet 0    CETIRIZINE HCL (ZYRTEC ORAL) daily as needed.       diazePAM (VALIUM) 5 MG tablet TAKE 1 TABLET IN THE EVENING BEFORE APPOINTMENT AND 1 TABLET 1 HOUR BEFORE APPOINTMENT      epinephrine (EPIPEN) 0.3 mg/0.3 mL (1:1,000) AtIn Inject 0.3 mLs (0.3 mg total) into the muscle once. 1 Pen Injector Intramuscular .  use as directed 0.3 mL 0    fluocinonide 0.05% (LIDEX) 0.05 % cream Apply topically 2 (two) times daily. back 30 g 1    fluticasone-salmeterol 250-50 mcg/dose (ADVAIR DISKUS) 250-50 mcg/dose diskus inhaler Inhale 1 puff into the lungs 2 (two) times daily as needed. 1 Disk with Device Inhalation Twice a day 60 each 12    ibandronate (BONIVA) 150 mg tablet Take 1 tablet (150 mg total) by mouth every 30 days. 3 tablet 3    meclizine (ANTIVERT) 12.5 mg tablet Take 1 tablet (12.5 mg total) by mouth 3 (three) times daily as needed. 20 tablet 0    mometasone (ASMANEX TWISTHALER) 220 mcg (60 doses) AePB 2 puffs inhaled once daily. Disp one canister 1 g 1    ondansetron (ZOFRAN-ODT) 4 MG TbDL Take 2 tablets (8 mg total) by mouth every 8 (eight) hours as needed. 5 tablet 0    sertraline (ZOLOFT) 50 MG tablet TAKE 1 TABLET BY MOUTH EVERY DAY 90 tablet 3    sod sulf-pot chloride-mag sulf (SUTAB) 1.479-0.188- 0.225 gram tablet Take 12 tablets by mouth once daily. BIN: [id number]  PCN: [id number]  GROUP: [id number] MEMBER ID: [id number] 24 tablet 0    sulfamethoxazole-trimethoprim 800-160mg (BACTRIM DS) 800-160 mg Tab Take 1 tablet by mouth 2 (two) times daily. 10 tablet 0    tretinoin (RETIN-A) 0.025 % cream Apply topically every evening. 45 g 1    vibegron 75 mg Tab Take 75 mg by mouth once daily. 90 tablet 0       Past Surgical History:   Procedure Laterality Date    ANAL FISTULOTOMY      BILATERAL SALPINGOOPHORECTOMY      BREAST BIOPSY Right ~1995    excisional biopsy- benign    BREAST LUMPECTOMY Left 1992     with ALND for IDC    CERVICAL CONIZATION   W/ LASER  1988     "CHOLECYSTECTOMY  2018    at [facility]    COLONOSCOPY N/A 3/11/2024    Procedure: COLONOSCOPY;  Surgeon: Mary Thorpe MD;  Location: [facility];  Service: Endoscopy;  Laterality: N/A;   ref by Lawson Summers NP, sutab, instr. to portal-st  3/5- lvm and portal msg for pc. DBM  3/7- pc complete with . DBM    GALLBLADDER SURGERY  2018    HYSTERECTOMY      TVH/ BSO       OOPHORECTOMY         Social History     Socioeconomic History    Marital status:      Spouse name: Esvin    Number of children: 2   Occupational History    Occupation: banker     Employer: [employer]   Tobacco Use    Smoking status: Never    Smokeless tobacco: Never   Substance and Sexual Activity    Alcohol use: No     Alcohol/week: 0.0 standard drinks of alcohol    Drug use: No    Sexual activity: Yes     Partners: Male     Birth control/protection: Surgical     Comment: :    TVH/BSO           Vital Signs Range (Last 24H):         CBC: No results for input(s): "WBC", "RBC", "HGB", "HCT", "PLT", "MCV", "MCH", "MCHC" in the last 72 hours.    CMP: No results for input(s): "NA", "K", "CL", "CO2", "BUN", "CREATININE", "GLU", "MG", "PHOS", "CALCIUM", "ALBUMIN", "PROT", "ALKPHOS", "ALT", "AST", "BILITOT" in the last 72 hours.    INR  No results for input(s): "PT", "INR", "PROTIME", "APTT" in the last 72 hours.        Diagnostic Studies:      EKD Echo:          Pre-op Assessment    I have reviewed the NPO Status.   I have reviewed the Medications.     Review of Systems  Anesthesia Hx:  No problems with previous Anesthesia   History of prior surgery of interest to airway management or planning:          Denies Family Hx of Anesthesia complications.    Denies Personal Hx of Anesthesia complications.                    Social:  Non-Smoker, No Alcohol Use       Hematology/Oncology:  Hematology Normal                       --  Cancer in past history:       Breast    left   chemotherapy, radiation and surgery     "   EENT/Dental:  chronic allergic rhinitis           Cardiovascular:  Exercise tolerance: good   Denies Pacemaker.  Denies Hypertension.    Denies CAD.       Denies Angina.          Functional Capacity good / => 4 METS      Hx of Heart Murmur                        Pulmonary:    Asthma   Denies Shortness of breath.   Denies Sleep Apnea.                Renal/:  Chronic Renal Disease renal calculi   Renal Symptoms/Infections/Stones: frequency, incontinence.  Urolithiasis, left kidney           Hepatic/GI:  Hepatic/GI Normal     Denies GERD. Denies Liver Disease.            Musculoskeletal:  Musculoskeletal Normal                Neurological:  Neurology Normal Denies TIA.  Denies CVA.    Denies Seizures.                                Endocrine:  Denies Diabetes. Hypothyroidism    Thyroid Disease           Obesity / BMI > 30  Dermatological:  Skin Normal    Psych:   anxiety depression                Physical Exam  General: Well nourished    Airway:  Mallampati: II   Mouth Opening: Normal  Tongue: Normal  Neck ROM: Normal ROM    Chest/Lungs:  Clear to auscultation        Anesthesia Plan  Type of Anesthesia, risks & benefits discussed:    Anesthesia Type: Gen ETT  Intra-op Monitoring Plan: Standard ASA Monitors  Post Op Pain Control Plan: multimodal analgesia  Induction:  IV  Airway Plan: Direct  Informed Consent: Informed consent signed with the Patient and all parties understand the risks and agree with anesthesia plan.  All questions answered.   ASA Score: 2    Ready For Surgery From Anesthesia Perspective.     .

## 2024-03-28 NOTE — PROGRESS NOTES
Patient discharged to home via wheelchair, escorted by her . Pt alert and talkative, vitals stable on room air, tolerating PO intake. Discharge instructions (written and verbal) and follow-up information given to patient who verbalized understanding, as well as a readiness for discharge. [facility] contact info provided for additional questions following discharge.

## 2024-03-28 NOTE — DISCHARGE SUMMARY
[facility] - Surgery (1st Fl)  Discharge Note  Short Stay    Procedure(s) (LRB):  CYSTOSCOPY (N/A)  URETEROSCOPY (Left)  LITHOTRIPSY, USING LASER (Left)  EXTRACTION - STONE (Left)  PLACEMENT-STENT (Left)  PYELOSCOPY (Left)      OUTCOME: Patient tolerated treatment/procedure well without complication and is now ready for discharge.    DISPOSITION: Home or Self Care    FINAL DIAGNOSIS:  Nephrolithiasis    FOLLOWUP: In clinic with Dr. Kan in 3 months with an xray and ultrasound beforehand    DISCHARGE INSTRUCTIONS:    Discharge Procedure Orders   X-Ray KUB   Standing Status: Future Standing Exp. Date: 03/28/25     Order Specific Question Answer Comments   May the Radiologist modify the order per protocol to meet the clinical needs of the patient? Yes    Release to patient Immediate      US Kidney   Standing Status: Future Standing Exp. Date: 09/28/24     Order Specific Question Answer Comments   May the Radiologist modify the order per protocol to meet the clinical needs of the patient? Yes    Release to patient Immediate      Basic Metabolic Panel   Standing Status: Future Number of Occurrences: 1 Standing Exp. Date: 05/12/25     CBC Without Differential   Standing Status: Future Number of Occurrences: 1 Standing Exp. Date: 05/12/25     TSH   Standing Status: Future Number of Occurrences: 1 Standing Exp. Date: 05/12/25     Notify your health care provider if you experience any of the following:  temperature >100.4     Notify your health care provider if you experience any of the following:  persistent nausea and vomiting or diarrhea     Notify your health care provider if you experience any of the following:  severe uncontrolled pain     Notify your health care provider if you experience any of the following:  redness, tenderness, or signs of infection (pain, swelling, redness, odor or green/yellow discharge around incision site)     Notify your health care provider if you experience any of the following:  worsening  rash     Notify your health care provider if you experience any of the following:  persistent dizziness, light-headedness, or visual disturbances        TIME SPENT ON DISCHARGE: 10 minutes

## 2024-03-29 NOTE — ANESTHESIA POSTPROCEDURE EVALUATION
Anesthesia Post Evaluation    Patient: Mayra Crawford    Procedure(s) Performed: Procedure(s) (LRB):  CYSTOSCOPY (N/A)  URETEROSCOPY (Left)  LITHOTRIPSY, USING LASER (Left)  EXTRACTION - STONE (Left)  PLACEMENT-STENT (Left)  PYELOSCOPY (Left)    Final Anesthesia Type: general      Patient location during evaluation: PACU  Patient participation: Yes- Able to Participate  Level of consciousness: awake and alert  Post-procedure vital signs: reviewed and stable  Pain management: adequate  Airway patency: patent    PONV status at discharge: No PONV  Anesthetic complications: no      Cardiovascular status: blood pressure returned to baseline  Respiratory status: unassisted  Hydration status: euvolemic  Follow-up not needed.              Vitals Value Taken Time   /67 03/28/24 1047   Temp 36.6 °C (97.9 °F) 03/28/24 0955   Pulse 77 03/28/24 1059   Resp 17 03/28/24 1059   SpO2 98 % 03/28/24 1059   Vitals shown include unvalidated device data.      No case tracking events are documented in the log.      Pain/Antonella Score: Antonella Score: 10 (3/28/2024 11:30 AM)

## 2024-04-02 LAB
COMPN STONE: NORMAL
LABORATORY COMMENT REPORT: NORMAL
SPECIMEN SOURCE: NORMAL
STONE ANALYSIS IR-IMP: NORMAL

## 2024-05-20 ENCOUNTER — TELEPHONE (OUTPATIENT)
Dept: FAMILY MEDICINE | Facility: CLINIC | Age: 64
End: 2024-05-20
Payer: COMMERCIAL

## 2024-05-20 NOTE — TELEPHONE ENCOUNTER
----- Message from Evelia Us sent at 5/20/2024  1:57 PM CDT -----  Name of Who is Calling: CURRY ENRIQUEZ [[id number]]                    What is the request in detail: Pt is requesting a call back regarding a weigh loss injection. Pt advised her insurance will approve it. Pt advised she did not know the name of the injection that was going to be prescribed. Please assist.                     Can the clinic reply by [facility]: No                    What [facility]:  [phone]

## 2024-07-01 ENCOUNTER — TELEPHONE (OUTPATIENT)
Dept: FAMILY MEDICINE | Facility: CLINIC | Age: 64
End: 2024-07-01
Payer: COMMERCIAL

## 2024-07-01 DIAGNOSIS — H91.90 HEARING LOSS, UNSPECIFIED HEARING LOSS TYPE, UNSPECIFIED LATERALITY: Primary | ICD-10-CM

## 2024-07-01 NOTE — TELEPHONE ENCOUNTER
----- Message from Paloma Santiago sent at 7/1/2024 11:58 AM CDT -----  Name of Who is Calling:CURRY ENRIQUEZ [[id number]]                   What is the request in detail: PT wants to come in this week to be seen about her hearing loss and I could only find the end of the month please assist                   Can the clinic reply by [facility]: No                   What Number to Call Back if not in [facility]: [phone]  or  [phone] karin knight

## 2024-07-08 ENCOUNTER — HOSPITAL ENCOUNTER (OUTPATIENT)
Dept: RADIOLOGY | Facility: HOSPITAL | Age: 64
Discharge: HOME | End: 2024-07-08
Attending: STUDENT IN AN ORGANIZED HEALTH CARE EDUCATION/TRAINING PROGRAM
Payer: COMMERCIAL

## 2024-07-08 DIAGNOSIS — N20.9 UROLITHIASIS: ICD-10-CM

## 2024-07-08 PROCEDURE — 76770 US EXAM ABDO BACK WALL COMP: CPT | Mod: 26,,, | Performed by: RADIOLOGY

## 2024-07-08 PROCEDURE — 76770 US EXAM ABDO BACK WALL COMP: CPT | Mod: TC

## 2024-07-09 ENCOUNTER — OFFICE VISIT (OUTPATIENT)
Dept: UROLOGY | Facility: CLINIC | Age: 64
End: 2024-07-09
Payer: COMMERCIAL

## 2024-07-09 VITALS
BODY MASS INDEX: 33.12 KG/M2 | DIASTOLIC BLOOD PRESSURE: 80 MMHG | HEART RATE: 68 BPM | SYSTOLIC BLOOD PRESSURE: 132 MMHG | HEIGHT: 64 IN | WEIGHT: 194 LBS

## 2024-07-09 DIAGNOSIS — N39.0 RECURRENT UTI: ICD-10-CM

## 2024-07-09 DIAGNOSIS — N20.0 LEFT NEPHROLITHIASIS: Primary | ICD-10-CM

## 2024-07-09 PROCEDURE — 1159F MED LIST DOCD IN RCRD: CPT | Mod: CPTII,S$GLB,, | Performed by: UROLOGY

## 2024-07-09 PROCEDURE — 3075F SYST BP GE 130 - 139MM HG: CPT | Mod: CPTII,S$GLB,, | Performed by: UROLOGY

## 2024-07-09 PROCEDURE — 1160F RVW MEDS BY RX/DR IN RCRD: CPT | Mod: CPTII,S$GLB,, | Performed by: UROLOGY

## 2024-07-09 PROCEDURE — 1101F PT FALLS ASSESS-DOCD LE1/YR: CPT | Mod: CPTII,S$GLB,, | Performed by: UROLOGY

## 2024-07-09 PROCEDURE — 99999 PR PBB SHADOW E&M-EST. PATIENT-LVL IV: CPT | Mod: PBBFAC,,, | Performed by: UROLOGY

## 2024-07-09 PROCEDURE — 3079F DIAST BP 80-89 MM HG: CPT | Mod: CPTII,S$GLB,, | Performed by: UROLOGY

## 2024-07-09 PROCEDURE — 99214 OFFICE O/P EST MOD 30 MIN: CPT | Mod: S$GLB,,, | Performed by: UROLOGY

## 2024-07-09 PROCEDURE — 1126F AMNT PAIN NOTED NONE PRSNT: CPT | Mod: CPTII,S$GLB,, | Performed by: UROLOGY

## 2024-07-09 PROCEDURE — 3288F FALL RISK ASSESSMENT DOCD: CPT | Mod: CPTII,S$GLB,, | Performed by: UROLOGY

## 2024-07-09 PROCEDURE — 3008F BODY MASS INDEX DOCD: CPT | Mod: CPTII,S$GLB,, | Performed by: UROLOGY

## 2024-07-09 NOTE — PROGRESS NOTES
" Patient ID: Mayra Crawford is a 64 y.o. female.    Chief Complaint:  kidney stone f/u    HPI  Patient is a 64 y.o. female who is established to our clinic but established [facility] and referred by their urologist, Dr. Marino for evaluation of kidney stones.     Patient initially evaluated for recurrent UTIS. Found to have two renal stones, left sided.   Underwent a left ureteroscopy with laser lithotripsy and stent placement on 3/28/24.  Stent was removed at home via strings.   Doing well.  No complaints.  Here to review imaging.     Interval History  Ultrasound of the kidneys from 7/8/24 was independently reviewed today and reveals normal kidneys bilaterally without hydronephrosis. Patients reports to be doing well today from a kidney stone standpoint. However, continues to be bothered by symptoms of OAB: nocuria, urgency, frequency. She is currently on a regimen of 4 mg tolterodine daily with incomplete symptoms relief. She was previously prescribed oxybutin (03/28/24) but does not remember ever taking it.  She also reports urinary incontinence; this is urge in nature and not stress incontinence.  She has daily episodes of incontinence.  She does not wear pads although she offers that "I should".     Additionally, she mentioned symptoms of vaginal dryness and some sexual dysfunction (vague in nature).  She does have a history of breast cancer remotely.  Treated with a lumpectomy followed by radiation, and tamoxifen.  She has been no evidence of disease for 33 years.      Family member with hx of sepsis 2/2 obstructing stone resulting in amputations    Medically necessary ROS documented in HPI    Past Medical History  Active Ambulatory Problems     Diagnosis Date Noted    Rhinitis, allergic 08/15/2012    Food allergy 08/15/2012    History of breast cancer 11/12/2012    Nephrolithiasis 11/12/2012    Colon cancer screening 10/18/2013    Subcutaneous nodule 06/11/2014    Mild intermittent asthma without " complication 09/12/2016    Acquired hypothyroidism 09/19/2017    Osteopenia of multiple sites 11/29/2023     Resolved Ambulatory Problems     Diagnosis Date Noted    Unspecified asthma(493.90) 08/15/2012    Dysuria 11/10/2012    Nausea 11/10/2012    Fever 11/14/2012    CMV (cytomegalovirus infection) 12/03/2012    Rib pain on right side 08/20/2013    Non-cardiac chest pain 01/26/2015    Upper abdominal pain 01/24/2018    Gallstones 03/15/2018     Past Medical History:   Diagnosis Date    Abnormal Pap smear of cervix 1988    Asthma     Breast cancer 1992    Chronic constipation     Genetic testing 12/2006    Heart murmur     kidney stone          Past Surgical History  Past Surgical History:   Procedure Laterality Date    ANAL FISTULOTOMY      BILATERAL SALPINGOOPHORECTOMY      BREAST BIOPSY Right ~1995    excisional biopsy- benign    BREAST LUMPECTOMY Left 1992     with ALND for IDC    CERVICAL CONIZATION   W/ LASER  1988    CHOLECYSTECTOMY  04/2018    at [facility]    COLONOSCOPY N/A 3/11/2024    Procedure: COLONOSCOPY;  Surgeon: Mary Thorpe MD;  Location: [facility];  Service: Endoscopy;  Laterality: N/A;  11/9 ref by Lawson Summers NP, sutab, instr. to portal-st  3/5- lvm and portal msg for pc. DBM  3/7- pc complete with . DBM    CYSTOSCOPY N/A 3/28/2024    Procedure: CYSTOSCOPY;  Surgeon: Herber Kan MD;  Location: [address];  Service: Urology;  Laterality: N/A;    EXTRACTION - STONE Left 3/28/2024    Procedure: EXTRACTION - STONE;  Surgeon: Herber Kan MD;  Location: [facility] OR [address] [place] [address];  Service: Urology;  Laterality: Left;    GALLBLADDER SURGERY  04/2018    HYSTERECTOMY  2002    TVH/ BSO       LASER LITHOTRIPSY Left 3/28/2024    Procedure: LITHOTRIPSY, USING LASER;  Surgeon: Herber Kan MD;  Location: [facility] OR [address] [place] [address];  Service: Urology;  Laterality: Left;    OOPHORECTOMY  2002    PLACEMENT-STENT Left 3/28/2024    Procedure: PLACEMENT-STENT;  Surgeon: Herber Kan MD;   [facility];  Service: Urology;  Laterality: Left;    PYELOSCOPY Left 3/28/2024    Procedure: PYELOSCOPY;  Surgeon: Herber Kan MD;  Location: [facility];  Service: Urology;  Laterality: Left;    URETEROSCOPY Left 3/28/2024    Procedure: URETEROSCOPY;  Surgeon: Herber Kan MD;  Location: [facility];  Service: Urology;  Laterality: Left;       Social History         Medications    Current Outpatient Medications:     albuterol (PROVENTIL/VENTOLIN HFA) 90 mcg/actuation inhaler, Inhale 2 puffs into the lungs every 6 (six) hours as needed for Wheezing., Disp: 18 g, Rfl: 12    ALPRAZolam (XANAX) 0.5 MG tablet, Take 1 tablet (0.5 mg total) by mouth 3 (three) times daily as needed for Anxiety (before flying)., Disp: 6 tablet, Rfl: 0    CETIRIZINE HCL (ZYRTEC ORAL), daily as needed. , Disp: , Rfl:     cloNIDine (CATAPRES) 0.1 MG tablet, TAKE 1 TABLET BY MOUTH EVERY DAY, Disp: 90 tablet, Rfl: 3    diazePAM (VALIUM) 5 MG tablet, TAKE 1 TABLET IN THE EVENING BEFORE APPOINTMENT AND 1 TABLET 1 HOUR BEFORE APPOINTMENT, Disp: , Rfl:     docusate sodium (STOOL SOFTENER) 50 MG capsule, Take by mouth 2 (two) times daily., Disp: , Rfl:     epinephrine (EPIPEN) 0.3 mg/0.3 mL (1:1,000) AtIn, Inject 0.3 mLs (0.3 mg total) into the muscle once. 1 Pen Injector Intramuscular .  use as directed, Disp: 0.3 mL, Rfl: 0    fluocinonide 0.05% (LIDEX) 0.05 % cream, Apply topically 2 (two) times daily. back, Disp: 30 g, Rfl: 1    ibandronate (BONIVA) 150 mg tablet, Take 1 tablet (150 mg total) by mouth every 30 days., Disp: 3 tablet, Rfl: 3    linaCLOtide (LINZESS) 72 mcg Cap capsule, Take 1 capsule (72 mcg total) by mouth before breakfast., Disp: 30 capsule, Rfl: 12    meclizine (ANTIVERT) 12.5 mg tablet, Take 1 tablet (12.5 mg total) by mouth 3 (three) times daily as needed., Disp: 20 tablet, Rfl: 0    mometasone (ASMANEX TWISTHALER) 220 mcg (60 doses) AePB, 2 puffs inhaled once daily. Disp one canister,  Disp: 1 g, Rfl: 1    oxybutynin (DITROPAN) 5 MG Tab, Take 1 tablet (5 mg total) by mouth 3 (three) times daily as needed., Disp: 21 tablet, Rfl: 0    paroxetine (PAXIL) 20 MG tablet, TAKE 1 TABLET BY MOUTH EVERY DAY IN THE MORNING, Disp: 90 tablet, Rfl: 3    polyethylene glycol (GLYCOLAX) 17 gram/dose powder, Use cap to measure out (17 g) mix with a liquid and take by mouth once daily., Disp: 510 g, Rfl: 0    tamsulosin (FLOMAX) 0.4 mg Cap, Take 1 capsule (0.4 mg total) by mouth every evening. for 7 days, Disp: 7 capsule, Rfl: 0    tolterodine (DETROL LA) 4 MG 24 hr capsule, Take 4 mg by mouth every morning., Disp: , Rfl:     tretinoin (RETIN-A) 0.025 % cream, Apply topically every evening., Disp: 45 g, Rfl: 1    Allergies  Review of patient's allergies indicates:   Allergen Reactions    Penicillins      Other reaction(s): Rash    Other Rash and Other (See Comments)     Other reaction(s): Swelling  Other reaction(s): Sneezing  Other reaction(s): Shortness of breath    Pt reports she is allergic to meat       Patient's PMH, FH, PSH, Social hx, Medications, allergies reviewed and updated as pertinent to today's visit.  Objective:      Physical Exam  Constitutional:       Appearance: She is well-developed.   HENT:      Head: Normocephalic and atraumatic.   Eyes:      Conjunctiva/sclera: Conjunctivae normal.   Pulmonary:      Effort: Pulmonary effort is normal. No respiratory distress.   Abdominal:      General: Abdomen is flat.   Skin:     Findings: No rash.   Neurological:      Mental Status: She is alert and oriented to person, place, and time.   Psychiatric:         Behavior: Behavior normal.         Imaging results: personally reviewed CT renal stone  2 x 0.9cm stones noted in Left lower pole    Assessment:       1. Left nephrolithiasis    2. Recurrent UTI    3. OAB  4. Post menopausal sexual dysfunction        Plan:         Left renal stones  General risk factors for kidney stones and the conservative measures to  prevent kidney stones in the future were discussed with the patient in detail.  The patient was encouraged to drink 2-3 liters of water a day, limit iced tea and dewayne as well as foods high in oxalate.  They were cautioned to try to limit salt and red meat intake.  We also discussed adding citrate to the diet with the addition of ginette or lemon juice to their water or alternatively with crystal light.     -imaging reviewed as per HPI.    F/u prn.    OAB/sexual dysfunction  - decreased efficacy to tolterodine; may consider medication change.      Female sexual dysfunction s/p menopause  - will refer to [place] to discuss trial of vaginal estrogen and for further evaluation of OAB.  Patient preferred to continue her care with [facility], so we will refer to Dr. Fritz for further management.     The patient was seen and examined with the resident physician.  All questions were answered.  The plan was discussed with the patient and I concur with the resident physician's documentation.

## 2024-08-21 ENCOUNTER — OFFICE VISIT (OUTPATIENT)
Dept: FAMILY MEDICINE | Facility: CLINIC | Age: 64
End: 2024-08-21
Payer: COMMERCIAL

## 2024-08-21 VITALS
HEIGHT: 65 IN | WEIGHT: 191.81 LBS | SYSTOLIC BLOOD PRESSURE: 136 MMHG | TEMPERATURE: 98 F | BODY MASS INDEX: 31.96 KG/M2 | HEART RATE: 80 BPM | OXYGEN SATURATION: 96 % | DIASTOLIC BLOOD PRESSURE: 72 MMHG

## 2024-08-21 DIAGNOSIS — E03.9 ACQUIRED HYPOTHYROIDISM: ICD-10-CM

## 2024-08-21 DIAGNOSIS — E66.9 OBESITY, UNSPECIFIED CLASSIFICATION, UNSPECIFIED OBESITY TYPE, UNSPECIFIED WHETHER SERIOUS COMORBIDITY PRESENT: Primary | ICD-10-CM

## 2024-08-21 DIAGNOSIS — Z85.3 HISTORY OF BREAST CANCER: ICD-10-CM

## 2024-08-21 PROCEDURE — 3008F BODY MASS INDEX DOCD: CPT | Mod: CPTII,S$GLB,, | Performed by: FAMILY MEDICINE

## 2024-08-21 PROCEDURE — 3288F FALL RISK ASSESSMENT DOCD: CPT | Mod: CPTII,S$GLB,, | Performed by: FAMILY MEDICINE

## 2024-08-21 PROCEDURE — 3078F DIAST BP <80 MM HG: CPT | Mod: CPTII,S$GLB,, | Performed by: FAMILY MEDICINE

## 2024-08-21 PROCEDURE — 3075F SYST BP GE 130 - 139MM HG: CPT | Mod: CPTII,S$GLB,, | Performed by: FAMILY MEDICINE

## 2024-08-21 PROCEDURE — 1159F MED LIST DOCD IN RCRD: CPT | Mod: CPTII,S$GLB,, | Performed by: FAMILY MEDICINE

## 2024-08-21 PROCEDURE — 1101F PT FALLS ASSESS-DOCD LE1/YR: CPT | Mod: CPTII,S$GLB,, | Performed by: FAMILY MEDICINE

## 2024-08-21 PROCEDURE — 1126F AMNT PAIN NOTED NONE PRSNT: CPT | Mod: CPTII,S$GLB,, | Performed by: FAMILY MEDICINE

## 2024-08-21 PROCEDURE — 99999 PR PBB SHADOW E&M-EST. PATIENT-LVL V: CPT | Mod: PBBFAC,,, | Performed by: FAMILY MEDICINE

## 2024-08-21 PROCEDURE — 99215 OFFICE O/P EST HI 40 MIN: CPT | Mod: S$GLB,,, | Performed by: FAMILY MEDICINE

## 2024-08-21 RX ORDER — SEMAGLUTIDE 0.25 MG/.5ML
0.25 INJECTION, SOLUTION SUBCUTANEOUS
Qty: 2 ML | Refills: 0 | Status: SHIPPED | OUTPATIENT
Start: 2024-08-21

## 2024-08-21 NOTE — PROGRESS NOTES
"HISTORY OF PRESENT ILLNESS:  Mayra Crawford is a 64 y.o. female who presents to the clinic today for Medication Management (Patient wants to be a medication for weight loss )  .       She wants to do glp1RA  She has read up on it  She understands the risks  Not sure of cost  Has tried traditional weight management  But has had limited success.    Patient Active Problem List   Diagnosis    Rhinitis, allergic    Food allergy    History of breast cancer    Nephrolithiasis    Colon cancer screening    Subcutaneous nodule    Mild intermittent asthma without complication    Acquired hypothyroidism    Osteopenia of multiple sites           CARE TEAM:  Patient Care Team:  Baron Long MD as PCP - General (Internal Medicine)  Valentina Kendall MA as Care Coordinator         ROS        PHYSICAL EXAM:   /72   Pulse 80   Temp 97.7 °F (36.5 °C) (Oral)   Ht 5' 5" (1.651 m)   Wt 87 kg (191 lb 12.8 oz)   LMP  (LMP Unknown) Comment: TVH/BSO   2001  SpO2 96%   BMI 31.92 kg/m²   BP Readings from Last 5 Encounters:   08/21/24 136/72   07/09/24 132/80   03/28/24 138/70   03/13/24 137/82   03/11/24 (!) 141/64     Wt Readings from Last 5 Encounters:   08/21/24 87 kg (191 lb 12.8 oz)   07/09/24 88 kg (194 lb 0.1 oz)   03/28/24 83.9 kg (185 lb)   03/13/24 84 kg (185 lb 3 oz)   02/07/24 85 kg (187 lb 6.3 oz)             She appears well, in no apparent distress.  Alert and oriented times three, pleasant and cooperative. Vital signs are as documented in vital signs section.       Medication List with Changes/Refills   New Medications    SEMAGLUTIDE, WEIGHT LOSS, (WEGOVY) 0.25 MG/0.5 ML PNIJ    Inject 0.25 mg into the skin every 7 days.   Current Medications    ALBUTEROL (PROVENTIL/VENTOLIN HFA) 90 MCG/ACTUATION INHALER    Inhale 2 puffs into the lungs every 6 (six) hours as needed for Wheezing.    ALPRAZOLAM (XANAX) 0.5 MG TABLET    Take 1 tablet (0.5 mg total) by mouth 3 (three) times daily as needed for Anxiety " (before flying).    CETIRIZINE HCL (ZYRTEC ORAL)    daily as needed.     CLONIDINE (CATAPRES) 0.1 MG TABLET    TAKE 1 TABLET BY MOUTH EVERY DAY    DIAZEPAM (VALIUM) 5 MG TABLET    TAKE 1 TABLET IN THE EVENING BEFORE APPOINTMENT AND 1 TABLET 1 HOUR BEFORE APPOINTMENT    DOCUSATE SODIUM (STOOL SOFTENER) 50 MG CAPSULE    Take 50 mg by mouth 2 (two) times daily.    EPINEPHRINE (EPIPEN) 0.3 MG/0.3 ML (1:1,000) ATIN    Inject 0.3 mLs (0.3 mg total) into the muscle once. 1 Pen Injector Intramuscular .  use as directed    FLUOCINONIDE 0.05% (LIDEX) 0.05 % CREAM    Apply topically 2 (two) times daily. back    IBANDRONATE (BONIVA) 150 MG TABLET    Take 1 tablet (150 mg total) by mouth every 30 days.    LINACLOTIDE (LINZESS) 72 MCG CAP CAPSULE    Take 1 capsule (72 mcg total) by mouth before breakfast.    MECLIZINE (ANTIVERT) 12.5 MG TABLET    Take 1 tablet (12.5 mg total) by mouth 3 (three) times daily as needed.    MOMETASONE (ASMANEX TWISTHALER) 220 MCG (60 DOSES) AEPB    2 puffs inhaled once daily. Disp one canister    OXYBUTYNIN (DITROPAN) 5 MG TAB    Take 1 tablet (5 mg total) by mouth 3 (three) times daily as needed.    PAROXETINE (PAXIL) 20 MG TABLET    TAKE 1 TABLET BY MOUTH EVERY DAY IN THE MORNING    POLYETHYLENE GLYCOL (GLYCOLAX) 17 GRAM/DOSE POWDER    Use cap to measure out (17 g) mix with a liquid and take by mouth once daily.    TAMSULOSIN (FLOMAX) 0.4 MG CAP    Take 1 capsule (0.4 mg total) by mouth every evening. for 7 days    TOLTERODINE (DETROL LA) 4 MG 24 HR CAPSULE    Take 4 mg by mouth every morning.    TRETINOIN (RETIN-A) 0.025 % CREAM    Apply topically every evening.         ASSESSMENT AND PLAN:    Problem List Items Addressed This Visit       History of breast cancer    Relevant Medications    semaglutide, weight loss, (WEGOVY) 0.25 mg/0.5 mL PnIj    Acquired hypothyroidism    Relevant Medications    semaglutide, weight loss, (WEGOVY) 0.25 mg/0.5 mL PnIj     Other Visit Diagnoses       Obesity,  unspecified classification, unspecified obesity type, unspecified whether serious comorbidity present    -  Primary    Relevant Medications    semaglutide, weight loss, (WEGOVY) 0.25 mg/0.5 mL PnIj          We had a prolonged discussion about these complex clinical issues and went over the various important aspects to consider. All questions were answered.  Spent 41 (40-54) minutes on patient total including: preparing for patient/charting/ordering tests/counseling and education and care coordination. (use [id number] for each 15 minutes over 69 minutes or  for medicare).      Future Appointments   Date Time Provider Department Center   2/17/2025  9:20 AM Vince Lafleur MD [facility]       Follow up in about 6 months (around 2/21/2025) for assess treatment plan. or sooner as needed.

## 2024-08-26 DIAGNOSIS — F41.9 ANXIETY: ICD-10-CM

## 2024-08-27 RX ORDER — PAROXETINE HYDROCHLORIDE 20 MG/1
20 TABLET, FILM COATED ORAL
Qty: 90 TABLET | Refills: 3 | Status: SHIPPED | OUTPATIENT
Start: 2024-08-27

## 2024-09-13 ENCOUNTER — TELEPHONE (OUTPATIENT)
Dept: HEMATOLOGY/ONCOLOGY | Facility: CLINIC | Age: 64
End: 2024-09-13
Payer: COMMERCIAL

## 2024-09-13 NOTE — TELEPHONE ENCOUNTER
----- Message from Yaneli Oliveira sent at 9/12/2024  3:43 PM CDT -----  Type:  Appointment Request     Name of Caller:CURRY ENRIQUEZ [[id number]]  When is the first available appointment?No access  Symptoms:  Would the patient rather a call back or a response via [facility]? call  Best Call Back Number:[phone]   Additional Information: The patient would like to schedule their annual appt with provider ana salinas.

## 2024-09-18 ENCOUNTER — HOSPITAL ENCOUNTER (EMERGENCY)
Facility: HOSPITAL | Age: 64
Discharge: HOME | End: 2024-09-18
Attending: EMERGENCY MEDICINE
Payer: COMMERCIAL

## 2024-09-18 VITALS
HEIGHT: 65 IN | HEART RATE: 73 BPM | WEIGHT: 180 LBS | OXYGEN SATURATION: 96 % | SYSTOLIC BLOOD PRESSURE: 166 MMHG | DIASTOLIC BLOOD PRESSURE: 82 MMHG | RESPIRATION RATE: 18 BRPM | TEMPERATURE: 98 F | BODY MASS INDEX: 29.99 KG/M2

## 2024-09-18 DIAGNOSIS — T50.905A ADVERSE EFFECT OF DRUG, INITIAL ENCOUNTER: Primary | ICD-10-CM

## 2024-09-18 DIAGNOSIS — R11.2 NAUSEA AND VOMITING, UNSPECIFIED VOMITING TYPE: ICD-10-CM

## 2024-09-18 LAB
ALBUMIN SERPL BCP-MCNC: 4.1 G/DL (ref 3.5–5.2)
ALP SERPL-CCNC: 92 U/L (ref 55–135)
ALT SERPL W/O P-5'-P-CCNC: 34 U/L (ref 10–44)
ANION GAP SERPL CALC-SCNC: 16 MMOL/L (ref 8–16)
AST SERPL-CCNC: 28 U/L (ref 10–40)
BASOPHILS # BLD AUTO: 0.04 K/UL (ref 0–0.2)
BASOPHILS NFR BLD: 0.6 % (ref 0–1.9)
BILIRUB SERPL-MCNC: 0.7 MG/DL (ref 0.1–1)
BUN SERPL-MCNC: 16 MG/DL (ref 8–23)
CALCIUM SERPL-MCNC: 10.1 MG/DL (ref 8.7–10.5)
CHLORIDE SERPL-SCNC: 103 MMOL/L (ref 95–110)
CO2 SERPL-SCNC: 24 MMOL/L (ref 23–29)
CREAT SERPL-MCNC: 1 MG/DL (ref 0.5–1.4)
DIFFERENTIAL METHOD BLD: ABNORMAL
EOSINOPHIL # BLD AUTO: 0 K/UL (ref 0–0.5)
EOSINOPHIL NFR BLD: 0.6 % (ref 0–8)
ERYTHROCYTE [DISTWIDTH] IN BLOOD BY AUTOMATED COUNT: 13.5 % (ref 11.5–14.5)
EST. GFR  (NO RACE VARIABLE): >60 ML/MIN/1.73 M^2
GLUCOSE SERPL-MCNC: 101 MG/DL (ref 70–110)
HCT VFR BLD AUTO: 43.6 % (ref 37–48.5)
HGB BLD-MCNC: 15.1 G/DL (ref 12–16)
IMM GRANULOCYTES # BLD AUTO: 0.01 K/UL (ref 0–0.04)
IMM GRANULOCYTES NFR BLD AUTO: 0.2 % (ref 0–0.5)
LIPASE SERPL-CCNC: 16 U/L (ref 4–60)
LYMPHOCYTES # BLD AUTO: 1.7 K/UL (ref 1–4.8)
LYMPHOCYTES NFR BLD: 27.3 % (ref 18–48)
MCH RBC QN AUTO: 31.5 PG (ref 27–31)
MCHC RBC AUTO-ENTMCNC: 34.6 G/DL (ref 32–36)
MCV RBC AUTO: 91 FL (ref 82–98)
MONOCYTES # BLD AUTO: 0.4 K/UL (ref 0.3–1)
MONOCYTES NFR BLD: 6.7 % (ref 4–15)
NEUTROPHILS # BLD AUTO: 4.1 K/UL (ref 1.8–7.7)
NEUTROPHILS NFR BLD: 64.6 % (ref 38–73)
NRBC BLD-RTO: 0 /100 WBC
PLATELET # BLD AUTO: 172 K/UL (ref 150–450)
PMV BLD AUTO: 11.3 FL (ref 9.2–12.9)
POTASSIUM SERPL-SCNC: 4.3 MMOL/L (ref 3.5–5.1)
PROT SERPL-MCNC: 8.7 G/DL (ref 6–8.4)
RBC # BLD AUTO: 4.8 M/UL (ref 4–5.4)
SODIUM SERPL-SCNC: 143 MMOL/L (ref 136–145)
WBC # BLD AUTO: 6.3 K/UL (ref 3.9–12.7)

## 2024-09-18 PROCEDURE — 63600175 PHARM REV CODE 636 W HCPCS: Performed by: EMERGENCY MEDICINE

## 2024-09-18 PROCEDURE — 83690 ASSAY OF LIPASE: CPT | Performed by: EMERGENCY MEDICINE

## 2024-09-18 PROCEDURE — 96361 HYDRATE IV INFUSION ADD-ON: CPT

## 2024-09-18 PROCEDURE — 96374 THER/PROPH/DIAG INJ IV PUSH: CPT

## 2024-09-18 PROCEDURE — 99284 EMERGENCY DEPT VISIT MOD MDM: CPT | Mod: 25

## 2024-09-18 PROCEDURE — 25000003 PHARM REV CODE 250: Performed by: EMERGENCY MEDICINE

## 2024-09-18 PROCEDURE — 80053 COMPREHEN METABOLIC PANEL: CPT | Performed by: EMERGENCY MEDICINE

## 2024-09-18 PROCEDURE — 96372 THER/PROPH/DIAG INJ SC/IM: CPT | Performed by: EMERGENCY MEDICINE

## 2024-09-18 PROCEDURE — 85025 COMPLETE CBC W/AUTO DIFF WBC: CPT | Performed by: EMERGENCY MEDICINE

## 2024-09-18 RX ORDER — ONDANSETRON 4 MG/1
4 TABLET, ORALLY DISINTEGRATING ORAL EVERY 6 HOURS PRN
Qty: 20 TABLET | Refills: 0 | Status: SHIPPED | OUTPATIENT
Start: 2024-09-18 | End: 2024-09-18

## 2024-09-18 RX ORDER — DICYCLOMINE HYDROCHLORIDE 20 MG/1
20 TABLET ORAL 2 TIMES DAILY PRN
Qty: 20 TABLET | Refills: 0 | Status: SHIPPED | OUTPATIENT
Start: 2024-09-18 | End: 2024-09-28

## 2024-09-18 RX ORDER — PROMETHAZINE HYDROCHLORIDE 25 MG/1
25 TABLET ORAL
Status: COMPLETED | OUTPATIENT
Start: 2024-09-18 | End: 2024-09-18

## 2024-09-18 RX ORDER — KETOROLAC TROMETHAMINE 30 MG/ML
15 INJECTION, SOLUTION INTRAMUSCULAR; INTRAVENOUS
Status: COMPLETED | OUTPATIENT
Start: 2024-09-18 | End: 2024-09-18

## 2024-09-18 RX ORDER — ONDANSETRON 4 MG/1
4 TABLET, ORALLY DISINTEGRATING ORAL EVERY 6 HOURS PRN
Qty: 20 TABLET | Refills: 0 | Status: SHIPPED | OUTPATIENT
Start: 2024-09-18

## 2024-09-18 RX ORDER — DICYCLOMINE HYDROCHLORIDE 10 MG/ML
20 INJECTION INTRAMUSCULAR
Status: COMPLETED | OUTPATIENT
Start: 2024-09-18 | End: 2024-09-18

## 2024-09-18 RX ORDER — ONDANSETRON HYDROCHLORIDE 2 MG/ML
4 INJECTION, SOLUTION INTRAVENOUS
Status: COMPLETED | OUTPATIENT
Start: 2024-09-18 | End: 2024-09-18

## 2024-09-18 RX ADMIN — KETOROLAC TROMETHAMINE 15 MG: 30 INJECTION, SOLUTION INTRAMUSCULAR at 06:09

## 2024-09-18 RX ADMIN — PROMETHAZINE HYDROCHLORIDE 25 MG: 25 TABLET ORAL at 07:09

## 2024-09-18 RX ADMIN — DICYCLOMINE HYDROCHLORIDE 20 MG: 10 INJECTION, SOLUTION INTRAMUSCULAR at 06:09

## 2024-09-18 RX ADMIN — ONDANSETRON 4 MG: 2 INJECTION INTRAMUSCULAR; INTRAVENOUS at 06:09

## 2024-09-18 RX ADMIN — SODIUM CHLORIDE 1000 ML: 9 INJECTION, SOLUTION INTRAVENOUS at 06:09

## 2024-09-18 NOTE — ED PROVIDER NOTES
Encounter Date: 9/18/2024    SCRIBE #1 NOTE: I, Sharri Birch, am scribing for, and in the presence of,  Delfin Jeffrey MD.       History     Chief Complaint   Patient presents with    Vomiting     Ti                                  Pt takes Semaglutide every Mon for weight loss and  takes Ozempic for DM.  Pt accidentally to Ozempic injection on Monday.   Since then pt c/o N/V, diffuse abd pain and bloating since Mon.           64 year old female with PMHx of hypothyroidism, chronic constipation, heart murmur presents to the ED with nausea and vomiting that began 2 days ago. She reports associated abdominal bloating and frequent belching.  at bedside states pt has not had a meal or had much to drink since onset. All symptoms began after pt accidentally injected herself with her 2 mg ozempic. She is prescribed 0.25 mg wegovy. Reports 3 episodes of vomiting/day. Last BM was a few days ago. States she usually has to take miralax to have a BM. No other exacerbating or alleviating factors. Denies abdominal pain, diarrhea, back pain, CP, dizziness, or other associated symptoms.     The history is provided by the patient and the spouse. No  was used.     Review of patient's allergies indicates:   Allergen Reactions    Penicillins      Other reaction(s): Rash    Other Rash and Other (See Comments)     Other reaction(s): Swelling  Other reaction(s): Sneezing  Other reaction(s): Shortness of breath    Pt reports she is allergic to meat     Past Medical History:   Diagnosis Date    Abnormal Pap smear of cervix 1988    H/O of [facility]      Acquired hypothyroidism 09/19/2017    Asthma     Breast cancer 1992    left breast IDC    Chronic constipation     Food allergy 08/15/2012    Genetic testing 12/2006    BRACAnalysis with rearrangement negative for mutation    Heart murmur     History of breast cancer 11/12/2012    kidney stone     Osteopenia of multiple sites 11/29/2023    Rhinitis, allergic  08/15/2012     Past Surgical History:   Procedure Laterality Date    ANAL FISTULOTOMY      BILATERAL SALPINGOOPHORECTOMY      BREAST BIOPSY Right ~1995    excisional biopsy- benign    BREAST LUMPECTOMY Left 1992     with ALND for IDC    CERVICAL CONIZATION   W/ LASER  1988    CHOLECYSTECTOMY  04/2018    at [facility]    COLONOSCOPY N/A 3/11/2024    Procedure: COLONOSCOPY;  Surgeon: Mary Thorpe MD;  Location: [facility];  Service: Endoscopy;  Laterality: N/A;  11/9 ref by Lawson Summers NP, sutab, instr. to portal-st  3/5- lvm and portal msg for pc. DBM  3/7- pc complete with . DBM    CYSTOSCOPY N/A 3/28/2024    Procedure: CYSTOSCOPY;  Surgeon: Herber Kan MD;  [facility];  Service: Urology;  Laterality: N/A;    EXTRACTION - STONE Left 3/28/2024    Procedure: EXTRACTION - STONE;  Surgeon: Herber Kan MD;  [facility];  Service: Urology;  Laterality: Left;    GALLBLADDER SURGERY  04/2018    HYSTERECTOMY  2002    TVH/ BSO       LASER LITHOTRIPSY Left 3/28/2024    Procedure: LITHOTRIPSY, USING LASER;  Surgeon: Herber Kan MD;  [facility];  Service: Urology;  Laterality: Left;    OOPHORECTOMY  2002    PLACEMENT-STENT Left 3/28/2024    Procedure: PLACEMENT-STENT;  Surgeon: Herber Kan MD;  [facility];  Service: Urology;  Laterality: Left;    PYELOSCOPY Left 3/28/2024    Procedure: PYELOSCOPY;  Surgeon: Herber Kan MD;  [facility];  Service: Urology;  Laterality: Left;    URETEROSCOPY Left 3/28/2024    Procedure: URETEROSCOPY;  Surgeon: Herber Kan MD;  [facility];  Service: Urology;  Laterality: Left;     Family History   Problem Relation Name Age of Onset    Hypertension Mother      Nephrolithiasis Mother      Cancer Father          lung    Diabetes Father      Heart disease Father      Aortic aneurysm Father          Abdominal    Lung cancer Father      Breast cancer Maternal Aunt           late 50s    Cancer Maternal Aunt          breast    Breast cancer Maternal Aunt M Great Aunts (x4)     Breast cancer Paternal Aunt p great aunt     Ovarian cancer Neg Hx      Heart attack Neg Hx      Anal fissures Neg Hx      Colon cancer Neg Hx      Esophageal cancer Neg Hx       Social History     Tobacco Use    Smoking status: Never    Smokeless tobacco: Never   Substance Use Topics    Alcohol use: No     Alcohol/week: 0.0 standard drinks of alcohol    Drug use: No     Review of Systems   Respiratory:  Negative for cough and shortness of breath.    Cardiovascular:  Negative for chest pain.   Gastrointestinal:  Positive for nausea and vomiting. Negative for abdominal distention, abdominal pain and diarrhea.        (+) frequent belching  (+) bloating   Musculoskeletal:  Negative for back pain.   Neurological:  Negative for dizziness.       Physical Exam     Initial Vitals [09/18/24 1730]   BP Pulse Resp Temp SpO2   (!) 156/90 86 18 97.8 °F (36.6 °C) 96 %      MAP       --         Physical Exam    Nursing note and vitals reviewed.  Constitutional: She appears well-developed. She is not diaphoretic. No distress.   HENT:   Head: Normocephalic.   Eyes: Conjunctivae are normal.   Cardiovascular:  Normal rate and regular rhythm.           No murmur heard.  Pulmonary/Chest: Breath sounds normal. She has no wheezes.   Abdominal: Abdomen is soft. She exhibits no distension. There is no abdominal tenderness.   Musculoskeletal:         General: Normal range of motion.      Comments: No BLE edema     Neurological: She is alert.   Skin: Skin is warm.         ED Course   Procedures  Labs Reviewed   CBC W/ AUTO DIFFERENTIAL - Abnormal       Result Value    WBC 6.30      RBC 4.80      Hemoglobin 15.1      Hematocrit 43.6      MCV 91      MCH 31.5 (*)     MCHC 34.6      RDW 13.5      Platelets 172      MPV 11.3      Immature Granulocytes 0.2      Gran # (ANC) 4.1      Immature Grans (Abs) 0.01      Lymph # 1.7      Mono #  0.4      Eos # 0.0      Baso # 0.04      nRBC 0      Gran % 64.6      Lymph % 27.3      Mono % 6.7      Eosinophil % 0.6      Basophil % 0.6      Differential Method Automated     COMPREHENSIVE METABOLIC PANEL - Abnormal    Sodium 143      Potassium 4.3      Chloride 103      CO2 24      Glucose 101      BUN 16      Creatinine 1.0      Calcium 10.1      Total Protein 8.7 (*)     Albumin 4.1      Total Bilirubin 0.7      Alkaline Phosphatase 92      AST 28      ALT 34      eGFR >60      Anion Gap 16     LIPASE    Lipase 16            Imaging Results    None          Medications   sodium chloride 0.9% bolus 1,000 mL 1,000 mL (0 mLs Intravenous Stopped 9/18/24 1924)   ondansetron injection 4 mg (4 mg Intravenous Given 9/18/24 1815)   dicyclomine injection 20 mg (20 mg Intramuscular Given 9/18/24 1815)   ketorolac injection 15 mg (15 mg Intravenous Given 9/18/24 1814)   promethazine tablet 25 mg (25 mg Oral Given 9/18/24 1926)     Medical Decision Making    64-year-old female presenting with nausea vomiting and abdominal discomfort.  The patient was normally prescribed 0.25 mg semaglutide.  The patient accidentally took a dose of 2 mg of semaglutide which was prescribed to her .  The patient reported shortly after the medication was given 2 days prior she began having nausea and vomiting bloating.  The patient has been able to drink fluids at times.  The patient was provided antiemetics as well as antispasmodic.  Patient was able to drink water here the patient reported symptoms improve.  The patient does not display any signs volume depletion.  No RICHARD noted.  Hemodynamically stable.  Provided IV fluids.  Provided symptomatic care prescriptions.  Follow up with primary care recommended.     Medical Decision Making:     A. Problem List:  1. Medication adverse effect  2. Nausea, vomiting     B. Differential diagnosis:    medication adverse effect, volume depletion, electrolyte abnormalities    Part of the note was  done using electronic dictation services.           Amount and/or Complexity of Data Reviewed  Independent Historian:      Details: See HPI  Labs: ordered. Decision-making details documented in ED Course.    Risk  Prescription drug management.               ED Course as of 09/18/24 1943   Wed Sep 18, 2024   1913 The patient was able to drink water. [JM]      ED Course User Index  [JM] Delfin Jeffrey MD                       I, Delfin Jeffrey, personally performed the services described in this documentation. All medical record entries made by the scribe were at my direction and in my presence. I have reviewed the chart and agree that the record reflects my personal performance and is accurate and complete.      DISCLAIMER: This note was prepared with [employer] voice recognition transcription software. Garbled syntax, mangled pronouns, and other bizarre constructions may be attributed to that software system.      Clinical Impression:  Final diagnoses:  [T50.905A] Adverse effect of drug, initial encounter (Primary)  [R11.2] Nausea and vomiting, unspecified vomiting type          ED Disposition Condition    Discharge Stable          ED Prescriptions       Medication Sig Dispense Start Date End Date Auth. Provider    ondansetron (ZOFRAN-ODT) 4 MG TbDL  (Status: Discontinued) Take 1 tablet (4 mg total) by mouth every 6 (six) hours as needed. 20 tablet 9/18/2024 9/18/2024 Delfin Jeffrey MD    dicyclomine (BENTYL) 20 mg tablet Take 1 tablet (20 mg total) by mouth 2 (two) times daily as needed (abdominal cramping). 20 tablet 9/18/2024 9/28/2024 Delfin Jeffrey MD    ondansetron (ZOFRAN-ODT) 4 MG TbDL Take 1 tablet (4 mg total) by mouth every 6 (six) hours as needed. 20 tablet 9/18/2024 -- Delfin Jeffrey MD          Follow-up Information       Follow up With Specialties Details Why Contact Info    Baron Long MD Internal Medicine Schedule an appointment as soon as possible for a visit in 3 days Primary care  [address] [facility]  [place] [address]  [phone]      [facility] - Emergency Dept Emergency Medicine  If symptoms worsen [address] [facility] [place] [address] [phone]             Delfin Jeffrey MD  09/18/24 1943

## 2024-09-18 NOTE — DISCHARGE INSTRUCTIONS
Please follow up with your primary care provider for re-evaluation if symptoms within the next 2-3 days.    Seek medical care if symptoms worsen or any concerns.

## 2024-10-04 DIAGNOSIS — I10 ESSENTIAL HYPERTENSION: ICD-10-CM

## 2024-10-04 DIAGNOSIS — N31.9 NEUROMUSCULAR DYSFUNCTION OF BLADDER, UNSPECIFIED: ICD-10-CM

## 2024-10-04 RX ORDER — CLONIDINE HYDROCHLORIDE 0.1 MG/1
TABLET ORAL
Qty: 90 TABLET | Refills: 3 | Status: SHIPPED | OUTPATIENT
Start: 2024-10-04

## 2024-10-04 RX ORDER — TOLTERODINE 4 MG/1
4 CAPSULE, EXTENDED RELEASE ORAL EVERY MORNING
Qty: 90 CAPSULE | Refills: 3 | Status: SHIPPED | OUTPATIENT
Start: 2024-10-04

## 2024-10-23 ENCOUNTER — TELEPHONE (OUTPATIENT)
Dept: FAMILY MEDICINE | Facility: CLINIC | Age: 64
End: 2024-10-23
Payer: COMMERCIAL

## 2024-10-23 DIAGNOSIS — E66.9 OBESITY, UNSPECIFIED CLASSIFICATION, UNSPECIFIED OBESITY TYPE, UNSPECIFIED WHETHER SERIOUS COMORBIDITY PRESENT: ICD-10-CM

## 2024-10-23 DIAGNOSIS — E03.9 ACQUIRED HYPOTHYROIDISM: ICD-10-CM

## 2024-10-23 DIAGNOSIS — Z85.3 HISTORY OF BREAST CANCER: ICD-10-CM

## 2024-10-23 NOTE — TELEPHONE ENCOUNTER
----- Message from Wine Ring sent at 10/23/2024  9:37 AM CDT -----  Name of Who is Calling:  CURRY ENRIQUEZ [[id number]]          What is the request in detail: Pt is requesting a call back regarding increasing dosage for Wegovy. Pt would like an increase in dosage and new rx sent to pharmacy. Pt pharmacy   [id number] - [address]. Please assist.             Can the clinic reply by [facility]: No          What Number to Call Back if not in [facility]:  [phone]

## 2024-10-23 NOTE — TELEPHONE ENCOUNTER
Care Due:                  Date            Visit Type   Department     Provider  --------------------------------------------------------------------------------                                [facility]                              PRIMARY      MED/ INTERNAL  Baron Wynn  Last Visit: 02-      CARE ([facility])   MED/ PEDS      Ehrensing  Next Visit: None Scheduled  None         None Found                                                            Last  Test          Frequency    Reason                     Performed    Due Date  --------------------------------------------------------------------------------    HBA1C.......  6 months...  semaglutide,.............  Not Found    Overdue    [employer] Embedded Care Due Messages. Reference number: [id number].   10/23/2024 9:33:43 AM CDT

## 2024-10-24 RX ORDER — SEMAGLUTIDE 0.25 MG/.5ML
0.25 INJECTION, SOLUTION SUBCUTANEOUS
Qty: 2 ML | Refills: 0 | Status: SHIPPED | OUTPATIENT
Start: 2024-10-24

## 2024-10-31 ENCOUNTER — TELEPHONE (OUTPATIENT)
Dept: FAMILY MEDICINE | Facility: CLINIC | Age: 64
End: 2024-10-31
Payer: COMMERCIAL

## 2024-10-31 DIAGNOSIS — E66.9 OBESITY, UNSPECIFIED CLASS, UNSPECIFIED OBESITY TYPE, UNSPECIFIED WHETHER SERIOUS COMORBIDITY PRESENT: Primary | ICD-10-CM

## 2024-11-20 DIAGNOSIS — M85.89 OSTEOPENIA OF MULTIPLE SITES: ICD-10-CM

## 2024-11-20 RX ORDER — IBANDRONATE SODIUM 150 MG/1
150 TABLET, FILM COATED ORAL
Qty: 3 TABLET | Refills: 3 | Status: SHIPPED | OUTPATIENT
Start: 2024-11-20

## 2024-12-03 ENCOUNTER — HOSPITAL ENCOUNTER (OUTPATIENT)
Dept: RADIOLOGY | Facility: HOSPITAL | Age: 64
Discharge: HOME | End: 2024-12-03
Attending: INTERNAL MEDICINE
Payer: COMMERCIAL

## 2024-12-03 ENCOUNTER — TELEPHONE (OUTPATIENT)
Dept: FAMILY MEDICINE | Facility: CLINIC | Age: 64
End: 2024-12-03
Payer: COMMERCIAL

## 2024-12-03 ENCOUNTER — OFFICE VISIT (OUTPATIENT)
Dept: HEMATOLOGY/ONCOLOGY | Facility: CLINIC | Age: 64
End: 2024-12-03
Payer: COMMERCIAL

## 2024-12-03 ENCOUNTER — PATIENT MESSAGE (OUTPATIENT)
Dept: HEMATOLOGY/ONCOLOGY | Facility: CLINIC | Age: 64
End: 2024-12-03

## 2024-12-03 VITALS
OXYGEN SATURATION: 99 % | HEART RATE: 61 BPM | DIASTOLIC BLOOD PRESSURE: 74 MMHG | HEIGHT: 65 IN | BODY MASS INDEX: 30.89 KG/M2 | TEMPERATURE: 98 F | WEIGHT: 185.44 LBS | SYSTOLIC BLOOD PRESSURE: 143 MMHG

## 2024-12-03 DIAGNOSIS — Z85.3 HISTORY OF BREAST CANCER: ICD-10-CM

## 2024-12-03 DIAGNOSIS — J45.20 MILD INTERMITTENT ASTHMA WITHOUT COMPLICATION: ICD-10-CM

## 2024-12-03 DIAGNOSIS — E66.9 OBESITY, UNSPECIFIED CLASS, UNSPECIFIED OBESITY TYPE, UNSPECIFIED WHETHER SERIOUS COMORBIDITY PRESENT: ICD-10-CM

## 2024-12-03 DIAGNOSIS — E03.9 ACQUIRED HYPOTHYROIDISM: ICD-10-CM

## 2024-12-03 DIAGNOSIS — Z12.31 ENCOUNTER FOR SCREENING MAMMOGRAM FOR HIGH-RISK PATIENT: ICD-10-CM

## 2024-12-03 DIAGNOSIS — M85.89 OSTEOPENIA OF MULTIPLE SITES: ICD-10-CM

## 2024-12-03 DIAGNOSIS — M85.89 OSTEOPENIA OF MULTIPLE SITES: Primary | ICD-10-CM

## 2024-12-03 DIAGNOSIS — Z85.3 HISTORY OF BREAST CANCER: Primary | ICD-10-CM

## 2024-12-03 PROCEDURE — 77063 BREAST TOMOSYNTHESIS BI: CPT | Mod: 26,,, | Performed by: RADIOLOGY

## 2024-12-03 PROCEDURE — 3008F BODY MASS INDEX DOCD: CPT | Mod: CPTII,S$GLB,, | Performed by: INTERNAL MEDICINE

## 2024-12-03 PROCEDURE — 99999 PR PBB SHADOW E&M-EST. PATIENT-LVL V: CPT | Mod: PBBFAC,,, | Performed by: INTERNAL MEDICINE

## 2024-12-03 PROCEDURE — 3288F FALL RISK ASSESSMENT DOCD: CPT | Mod: CPTII,S$GLB,, | Performed by: INTERNAL MEDICINE

## 2024-12-03 PROCEDURE — 1126F AMNT PAIN NOTED NONE PRSNT: CPT | Mod: CPTII,S$GLB,, | Performed by: INTERNAL MEDICINE

## 2024-12-03 PROCEDURE — 3077F SYST BP >= 140 MM HG: CPT | Mod: CPTII,S$GLB,, | Performed by: INTERNAL MEDICINE

## 2024-12-03 PROCEDURE — 77063 BREAST TOMOSYNTHESIS BI: CPT | Mod: TC

## 2024-12-03 PROCEDURE — 3078F DIAST BP <80 MM HG: CPT | Mod: CPTII,S$GLB,, | Performed by: INTERNAL MEDICINE

## 2024-12-03 PROCEDURE — 1159F MED LIST DOCD IN RCRD: CPT | Mod: CPTII,S$GLB,, | Performed by: INTERNAL MEDICINE

## 2024-12-03 PROCEDURE — 99213 OFFICE O/P EST LOW 20 MIN: CPT | Mod: S$GLB,,, | Performed by: INTERNAL MEDICINE

## 2024-12-03 PROCEDURE — 77067 SCR MAMMO BI INCL CAD: CPT | Mod: 26,,, | Performed by: RADIOLOGY

## 2024-12-03 PROCEDURE — 1101F PT FALLS ASSESS-DOCD LE1/YR: CPT | Mod: CPTII,S$GLB,, | Performed by: INTERNAL MEDICINE

## 2024-12-03 RX ORDER — SEMAGLUTIDE 0.5 MG/.5ML
INJECTION, SOLUTION SUBCUTANEOUS
Qty: 4 ML | Refills: 0 | Status: SHIPPED | OUTPATIENT
Start: 2024-12-03

## 2024-12-03 RX ORDER — ALBUTEROL SULFATE 90 UG/1
2 INHALANT RESPIRATORY (INHALATION) EVERY 6 HOURS PRN
Qty: 18 G | Refills: 12 | Status: SHIPPED | OUTPATIENT
Start: 2024-12-03 | End: 2025-12-03

## 2024-12-03 NOTE — PROGRESS NOTES
Subjective:       Patient ID: Mayra Crawford is a 64 y.o. female.    Chief Complaint: No chief complaint on file.      HPI Ms. Crawford returned to clinic for followup of a remote history of ER+ carcinoma of the left breast.  She has hypothyroidism and asthma.      She started Wegovy 2 months ago for weight loss but has not had benefit yet.    Her asthma flared up some over the Thanksgiving holiday.  Only pain with some right lateral breast has as spontaneously.  She requests a gyn well-woman visit.        Breast history :  Breast Cancer Stage 1 ER+ diagnosed in 1992 treated with lumpectomy and adjuvant       chemotherapy with 5-FU and methotrexate on protocol NSABP B-20.    She then took 5 years of tamoxifen and completed in 1997.    BMD in November 2023 showed osteopenia - on Boniva      Review of Systems   Constitutional:  Negative for activity change, appetite change and unexpected weight change.   Respiratory:  Negative for cough, shortness of breath and wheezing.    Cardiovascular:  Negative for chest pain.   Gastrointestinal:  Negative for abdominal distention, diarrhea, nausea and vomiting.   Musculoskeletal:  Negative for back pain and neck pain.   Skin:  Negative for rash.   Psychiatric/Behavioral:  Negative for dysphoric mood. The patient is not nervous/anxious.        Objective:      Physical Exam  Vitals reviewed.   Constitutional:       Appearance: She is well-developed.   HENT:      Mouth/Throat:      Pharynx: No oropharyngeal exudate.   Eyes:      General: No scleral icterus.  Cardiovascular:      Rate and Rhythm: Normal rate and regular rhythm.   Pulmonary:      Effort: Pulmonary effort is normal.      Breath sounds: Wheezing (few right chest) present. No rhonchi or rales.   Chest:   Breasts:     Right: No mass, nipple discharge or skin change.      Left: No mass, nipple discharge or skin change.       Abdominal:      Palpations: Abdomen is soft. There is no mass.      Tenderness: There is no  abdominal tenderness.   Lymphadenopathy:      Cervical: No cervical adenopathy.      Upper Body:      Right upper body: No supraclavicular adenopathy.      Left upper body: No supraclavicular adenopathy.   Neurological:      Mental Status: She is oriented to person, place, and time.   Psychiatric:         Mood and Affect: Mood normal.         Behavior: Behavior normal.         Thought Content: Thought content normal.         Judgment: Judgment normal.       Assessment:     Mammogram negative  1. History of breast cancer    2. Acquired hypothyroidism    3. Osteopenia of multiple sites        Plan:           Return in 1 year with mammograms.      Route Chart for Scheduling    Med Onc Chart Routing      Follow up with physician 1 year.   Follow up with MARYANN    Infusion scheduling note    Injection scheduling note    Labs None   Scheduling:  Preferred lab:  Lab interval:     Imaging Mammogram      Pharmacy appointment No pharmacy appointment needed      Other referrals no referral to Oncology Primary Care needed -  no Massage appointment needed    Additional referrals needed  GYN next avail

## 2024-12-03 NOTE — TELEPHONE ENCOUNTER
Care Due:                  Date            Visit Type   Department     Provider  --------------------------------------------------------------------------------                                EP [facility]                              PRIMARY      MED/ INTERNAL  Baron Wynn  Last Visit: 02-      CARE ([facility])   MED/ PEDS      [place]  Next Visit: None Scheduled  None         None Found                                                            Last  Test          Frequency    Reason                     Performed    Due Date  --------------------------------------------------------------------------------    Office Visit  12 months..  linaCLOtide..............  02- 02-    [facility] Embedded Care Due Messages. Reference number: [id number].   12/03/2024 7:23:17 AM CST

## 2024-12-03 NOTE — TELEPHONE ENCOUNTER
----- Message from Med Assistant Rice sent at 12/3/2024  1:06 PM CST -----  Type: Patient Call Back    Who called: Self    What is the request in detail: is asking for her WEGOVY to be increased..     Can the clinic reply by [facility]?NO    Would the patient rather a call back or a response via [facility]? Yes, call     Best call back number: [phone]

## 2025-01-09 ENCOUNTER — OFFICE VISIT (OUTPATIENT)
Dept: OBSTETRICS AND GYNECOLOGY | Facility: CLINIC | Age: 65
End: 2025-01-09
Attending: STUDENT IN AN ORGANIZED HEALTH CARE EDUCATION/TRAINING PROGRAM
Payer: COMMERCIAL

## 2025-01-09 VITALS
DIASTOLIC BLOOD PRESSURE: 84 MMHG | HEIGHT: 65 IN | SYSTOLIC BLOOD PRESSURE: 134 MMHG | WEIGHT: 184.75 LBS | BODY MASS INDEX: 30.78 KG/M2

## 2025-01-09 DIAGNOSIS — Z01.419 ENCOUNTER FOR GYNECOLOGICAL EXAMINATION: Primary | ICD-10-CM

## 2025-01-09 DIAGNOSIS — Z85.3 HISTORY OF BREAST CANCER: ICD-10-CM

## 2025-01-09 DIAGNOSIS — F52.31 ANORGASMIA OF FEMALE: ICD-10-CM

## 2025-01-09 DIAGNOSIS — N94.10 DYSPAREUNIA IN FEMALE: ICD-10-CM

## 2025-01-09 DIAGNOSIS — Z85.3 HISTORY OF BREAST CANCER: Primary | ICD-10-CM

## 2025-01-09 DIAGNOSIS — Z13.820 OSTEOPOROSIS SCREENING: ICD-10-CM

## 2025-01-09 PROCEDURE — 3079F DIAST BP 80-89 MM HG: CPT | Mod: CPTII,S$GLB,, | Performed by: STUDENT IN AN ORGANIZED HEALTH CARE EDUCATION/TRAINING PROGRAM

## 2025-01-09 PROCEDURE — 1101F PT FALLS ASSESS-DOCD LE1/YR: CPT | Mod: CPTII,S$GLB,, | Performed by: STUDENT IN AN ORGANIZED HEALTH CARE EDUCATION/TRAINING PROGRAM

## 2025-01-09 PROCEDURE — 99397 PER PM REEVAL EST PAT 65+ YR: CPT | Mod: S$GLB,,, | Performed by: STUDENT IN AN ORGANIZED HEALTH CARE EDUCATION/TRAINING PROGRAM

## 2025-01-09 PROCEDURE — 1159F MED LIST DOCD IN RCRD: CPT | Mod: CPTII,S$GLB,, | Performed by: STUDENT IN AN ORGANIZED HEALTH CARE EDUCATION/TRAINING PROGRAM

## 2025-01-09 PROCEDURE — 3008F BODY MASS INDEX DOCD: CPT | Mod: CPTII,S$GLB,, | Performed by: STUDENT IN AN ORGANIZED HEALTH CARE EDUCATION/TRAINING PROGRAM

## 2025-01-09 PROCEDURE — 99999 PR PBB SHADOW E&M-EST. PATIENT-LVL IV: CPT | Mod: PBBFAC,,, | Performed by: STUDENT IN AN ORGANIZED HEALTH CARE EDUCATION/TRAINING PROGRAM

## 2025-01-09 PROCEDURE — 3075F SYST BP GE 130 - 139MM HG: CPT | Mod: CPTII,S$GLB,, | Performed by: STUDENT IN AN ORGANIZED HEALTH CARE EDUCATION/TRAINING PROGRAM

## 2025-01-09 PROCEDURE — 3288F FALL RISK ASSESSMENT DOCD: CPT | Mod: CPTII,S$GLB,, | Performed by: STUDENT IN AN ORGANIZED HEALTH CARE EDUCATION/TRAINING PROGRAM

## 2025-01-09 PROCEDURE — 1160F RVW MEDS BY RX/DR IN RCRD: CPT | Mod: CPTII,S$GLB,, | Performed by: STUDENT IN AN ORGANIZED HEALTH CARE EDUCATION/TRAINING PROGRAM

## 2025-01-10 RX ORDER — PRASTERONE 6.5 MG/1
INSERT VAGINAL
Qty: 28 EACH | Refills: 3 | OUTPATIENT
Start: 2025-01-10

## 2025-01-10 RX ORDER — PRASTERONE 6.5 MG/1
1 INSERT VAGINAL
Qty: 28 EACH | Refills: 11 | Status: SHIPPED | OUTPATIENT
Start: 2025-01-13

## 2025-01-10 NOTE — TELEPHONE ENCOUNTER
Refill Routing Note   Medication(s) are not appropriate for processing by [facility] for the following reason(s):        Outside of protocol    [facility] action(s):  Route               Appointments  past 12m or future 3m with PCP    Date Provider   Last Visit   1/9/2025 Jacqueline Barragan MD   Next Visit   Visit date not found Jacqueline Barragan MD   ED visits in past 90 days: 0        Note composed:8:00 PM 01/09/2025

## 2025-01-10 NOTE — PROGRESS NOTES
Chief Complaint: Well Woman Exam     HPI:      Mayra Crawford is a 64 y.o.  who presents for annual exam as new GYN patient.   H/o TVH/BSO.   Today patient's GYN complaints include: unable to orgasm during intercourse.  Notes she has 2 bumps in vaginal area that have been there for years but would like me to evaluate.   Also requests GI referral for chronic constipation, takes pericolace twice daily and miralax once daily without relief.  Specifically, patient denies vaginal bleeding, discharge, pelvic pain, urinary problems.    Ms. Crawford is currently sexually active with a single male partner.    Previous Pap: NILM, HPV negative (10/23/2023)  Previous Mammogram: BiRads: 2 (2024)  Most Recent Dexa: Osteopenia (2023), Repeat now  Most Recent Colonoscopy: Benign polyps (3/2024), Repeat 7 yrs    Past Medical History:   Diagnosis Date    Abnormal Pap smear of cervix     H/O of [facility]      Acquired hypothyroidism 2017    Asthma     Breast cancer     left breast IDC    Chronic constipation     Food allergy 08/15/2012    Genetic testing 2006    BRACAnalysis with rearrangement negative for mutation    Heart murmur     History of breast cancer 2012    kidney stone     Osteopenia of multiple sites 2023    Rhinitis, allergic 08/15/2012       Past Surgical History:   Procedure Laterality Date    ANAL FISTULOTOMY      BILATERAL SALPINGOOPHORECTOMY      BREAST BIOPSY Right     excisional biopsy- benign    BREAST LUMPECTOMY Left      with ALND for IDC    CERVICAL CONIZATION   W/ LASER      CHOLECYSTECTOMY  2018    at [facility]    COLONOSCOPY N/A 3/11/2024    Procedure: COLONOSCOPY;  Surgeon: Mary Thorpe MD;  Location: [facility];  Service: Endoscopy;  Laterality: N/A;   ref by Lawson Summers NP, sutab, instr. to portal-st  3- lvm and portal msg for pc. DBM  3/7- pc complete with . DBM    CYSTOSCOPY N/A 3/28/2024    Procedure: CYSTOSCOPY;  Surgeon: Herber Kan  MD DEMETRIO;  [facility];  Service: Urology;  Laterality: N/A;    EXTRACTION - STONE Left 3/28/2024    Procedure: EXTRACTION - STONE;  Surgeon: Herber Kan MD;  [facility];  Service: Urology;  Laterality: Left;    GALLBLADDER SURGERY  04/2018    HYSTERECTOMY  2002    TVH/ BSO       LASER LITHOTRIPSY Left 3/28/2024    Procedure: LITHOTRIPSY, USING LASER;  Surgeon: Herber Kan MD;  Location: [facility];  Service: Urology;  Laterality: Left;    OOPHORECTOMY  2002    PLACEMENT-STENT Left 3/28/2024    Procedure: PLACEMENT-STENT;  Surgeon: Herber Kan MD;  Location: [facility];  Service: Urology;  Laterality: Left;    PYELOSCOPY Left 3/28/2024    Procedure: PYELOSCOPY;  Surgeon: Herber Kan MD;  Location: [facility];  Service: Urology;  Laterality: Left;    URETEROSCOPY Left 3/28/2024    Procedure: URETEROSCOPY;  Surgeon: Herber Kan MD;  Location: [facility];  Service: Urology;  Laterality: Left;       Social History     Socioeconomic History    Marital status:      Spouse name: Esvin    Number of children: 2   Occupational History    Occupation: [employer]     Employer: [employer]   Tobacco Use    Smoking status: Never    Smokeless tobacco: Never   Substance and Sexual Activity    Alcohol use: No     Alcohol/week: 0.0 standard drinks of alcohol    Drug use: No    Sexual activity: Yes     Partners: Male     Birth control/protection: Surgical     Comment: :    TVH/BSO  2002       Family History   Problem Relation Name Age of Onset    Hypertension Mother      Nephrolithiasis Mother      Cancer Father          lung    Diabetes Father      Heart disease Father      Aortic aneurysm Father          Abdominal    Lung cancer Father      Breast cancer Maternal Aunt          late 50s    Cancer Maternal Aunt          breast    Breast cancer Maternal Aunt M Great Aunts (x4)     Breast cancer Paternal Aunt p great aunt     Ovarian cancer  "Neg Hx      Heart attack Neg Hx      Anal fissures Neg Hx      Colon cancer Neg Hx      Esophageal cancer Neg Hx         Review of patient's allergies indicates:   Allergen Reactions    Penicillins      Other reaction(s): Rash    Other Rash and Other (See Comments)     Other reaction(s): Swelling  Other reaction(s): Sneezing  Other reaction(s): Shortness of breath    Pt reports she is allergic to meat       OB History          2    Para   2    Term   2            AB        Living   2         SAB        IAB        Ectopic        Multiple        Live Births   2           Obstetric Comments   Menarche 8               Physical Exam:      PHYSICAL EXAM:  /84   Ht 5' 5" (1.651 m)   Wt 83.8 kg (184 lb 11.9 oz)   LMP  (LMP Unknown) Comment: TVH/BSO     BMI 30.74 kg/m²   Body mass index is 30.74 kg/m².     APPEARANCE: Well nourished, well developed, in no acute distress.  PSYCH: Appropriate mood and affect.  SKIN: No acne or hirsutism  NECK: Neck symmetric without masses or thyromegaly  NODES: No inguinal, axillary, or supraclavicular lymph node enlargement  CHEST: Normal respiratory effort.  ABDOMEN: Soft.  No tenderness or masses.   BREASTS: Symmetrical, no visible skin lesions. No palpable masses. No nipple discharge bilaterally.  PELVIC: Normal external genitalia without lesions.  Normal hair distribution.  Adequate perineal body, normal urethral meatus.  Vagina atrophic. 2 small sebaceous cysts labia. Without discharge.  Normal appearing vaginal cuff.  No significant cystocele or rectocele.  Bimanual exam shows no midline or adnexal masses.      Assessment/Plan:     Encounter for gynecological examination    History of breast cancer  -     Ambulatory referral/consult to Gynecology  -     prasterone, dhea, (INTRAROSA) 6.5 mg Inst; Place 1 tablet vaginally twice a week.  Dispense: 28 each; Refill: 11    Osteoporosis screening  -     DXA Bone Density Axial Skeleton 1 or more sites; Future; " Expected date: 01/09/2025    Dyspareunia in female  -     Discontinue: prasterone, dhea, (INTRAROSA) 6.5 mg Inst; Place 6.5 mg vaginally twice a week.  Dispense: 28 each; Refill: 3  -     prasterone, dhea, (INTRAROSA) 6.5 mg Inst; Place 1 tablet vaginally twice a week.  Dispense: 28 each; Refill: 11    Anorgasmia of female  -     Discontinue: prasterone, dhea, (INTRAROSA) 6.5 mg Inst; Place 6.5 mg vaginally twice a week.  Dispense: 28 each; Refill: 3  -     prasterone, dhea, (INTRAROSA) 6.5 mg Inst; Place 1 tablet vaginally twice a week.  Dispense: 28 each; Refill: 11      - pelvic exam normal  - pap/hpv due 10/2026  - discussed management options for atrophy in setting of breast ca history, will try Intrarosa suppositories - may also help with anorgasmia - she will keep me updated on symptoms  - GI referral placed  - MMG due 12/25  - DXA due and ordered  - other preventative care with PCP  - RTC 1 yr or sooner prn      Counseling:     Patient was counseled today on current ASCCP pap guidelines, the recommendation for yearly physical exams, safe driving habits, breast self awareness and annual mammograms. She is to see her PCP for other health maintenance.       Use of the [employer] Patient Portal discussed and encouraged during today's visit.

## 2025-01-13 ENCOUNTER — TELEPHONE (OUTPATIENT)
Dept: HEMATOLOGY/ONCOLOGY | Facility: CLINIC | Age: 65
End: 2025-01-13
Payer: COMMERCIAL

## 2025-01-13 DIAGNOSIS — E66.9 OBESITY, UNSPECIFIED CLASS, UNSPECIFIED OBESITY TYPE, UNSPECIFIED WHETHER SERIOUS COMORBIDITY PRESENT: ICD-10-CM

## 2025-01-13 RX ORDER — SEMAGLUTIDE 0.5 MG/.5ML
0.5 INJECTION, SOLUTION SUBCUTANEOUS
Qty: 4 ML | Refills: 0 | Status: SHIPPED | OUTPATIENT
Start: 2025-01-13

## 2025-01-13 NOTE — TELEPHONE ENCOUNTER
----- Message from Nikole Hughes NP sent at 1/13/2025  1:39 PM CST -----  Yes intrarosa is okay with people with a history of ER positive breast cancer.    As long as it is local (this is a vaginal suppository) then they can take it.  ----- Message -----  From: Laura Botello RN  Sent: 1/13/2025   1:36 PM CST  To: Nikole Hughes NP    Please review the medication and let me know if it's okay for patient to take. Thanks much.  ----- Message -----  From: Dmitry Colbert  Sent: 1/13/2025   1:33 PM CST  To: Joshua SANTIAGO Staff    Name Of Caller: Mayra        Provider Name:Delfin Lewis         Does patient feel the need to be seen today? no        Relationship to the Pt?: patient        Contact Preference?:  [facility]        What is the nature of the call?:Patient states that she would like to speak with someone in the office to discuss a new medication (prasterone, dhea, (INTRAROSA) 6.5 mg Inst) that was prescribed to get by Dr Jacqueline Barragan. Patient wants to know if it is okay for her to take this medication.

## 2025-01-13 NOTE — TELEPHONE ENCOUNTER
Returned patient call after advisement from Ellen Agustin NP  intrarosa is okay with people with a history of ER positive breast cancer.     As long as it is local (this is a vaginal suppository) then they can take it.    Patient was informed of the information, and verbalized understanding  Encouraged to contact office with any further questions or concerns.    No further questions or concerns noted during call.

## 2025-01-13 NOTE — TELEPHONE ENCOUNTER
----- Message from Dmitry sent at 1/13/2025  1:26 PM CST -----  Type:  RX Refill Request    Who Called: Mayra  Refill or New Rx: refill  RX Name and Strength: semaglutide, weight loss, (WEGOVY) 0.5 mg/0.5 mL PnIj  How is the patient currently taking it? (ex. 1XDay):  INJECT 0.5 MG INTO THE SKIN ONE TIME PER WEEK  Is this a 30 day or 90 day RX: 30 day  Preferred Pharmacy with phone number: [facility]  [phone]  Local or Mail Order:   Ordering Provider: Vince Lafleur  Would the patient rather a call back or a response via [facility]? [facility]  Best Call Back Number: [phone]  Additional Information:  Patient would also like to know if her dosage can be increased

## 2025-01-13 NOTE — TELEPHONE ENCOUNTER
Care Due:                  Date            Visit Type   Department     Provider  --------------------------------------------------------------------------------                                [facility]                              PRIMARY      MED/ INTERNAL  Baron Wynn  Last Visit: 02-      CARE ([facility])   MED/ PEDS      Ehrensing  Next Visit: None Scheduled  None         None Found                                                            Last  Test          Frequency    Reason                     Performed    Due Date  --------------------------------------------------------------------------------    HBA1C.......  6 months...  semaglutide,.............  Not Found    Overdue    [employer] Embedded Care Due Messages. Reference number: [id number].   1/13/2025 1:46:01 PM CST

## 2025-01-13 NOTE — TELEPHONE ENCOUNTER
----- Message from Dmitry sent at 1/13/2025  1:35 PM CST -----  Name Of Caller: Mayra        Provider Name:Jacqueline Barragan        Does patient feel the need to be seen today? no        Relationship to the Pt?: patient        Contact Preference?:  [facility]        What is the nature of the call?: Patient states that she would like to speak with someone in the office to make sure that her prescription  for prasterone, dhea, (INTRAROSA) 6.5 mg Inst was sent to [facility] and not the [facility].

## 2025-02-05 ENCOUNTER — HOSPITAL ENCOUNTER (OUTPATIENT)
Dept: RADIOLOGY | Facility: OTHER | Age: 65
Discharge: HOME | End: 2025-02-05
Attending: STUDENT IN AN ORGANIZED HEALTH CARE EDUCATION/TRAINING PROGRAM
Payer: COMMERCIAL

## 2025-02-05 DIAGNOSIS — Z13.820 OSTEOPOROSIS SCREENING: ICD-10-CM

## 2025-02-05 PROCEDURE — 77080 DXA BONE DENSITY AXIAL: CPT | Mod: TC

## 2025-02-05 PROCEDURE — 77080 DXA BONE DENSITY AXIAL: CPT | Mod: 26,,, | Performed by: RADIOLOGY

## 2025-02-19 DIAGNOSIS — F52.31 ANORGASMIA OF FEMALE: ICD-10-CM

## 2025-02-19 DIAGNOSIS — N94.10 DYSPAREUNIA IN FEMALE: ICD-10-CM

## 2025-02-19 DIAGNOSIS — Z85.3 HISTORY OF BREAST CANCER: ICD-10-CM

## 2025-02-19 NOTE — TELEPHONE ENCOUNTER
Laurel pt called in wanting the script for (INTRAROSA) 6.5 mg Inst  need to go to [facility] pt also states she did not get the referral for the constipation problem, pt would like a call back

## 2025-02-20 ENCOUNTER — OFFICE VISIT (OUTPATIENT)
Dept: FAMILY MEDICINE | Facility: CLINIC | Age: 65
End: 2025-02-20
Payer: COMMERCIAL

## 2025-02-20 VITALS
HEIGHT: 65 IN | SYSTOLIC BLOOD PRESSURE: 130 MMHG | TEMPERATURE: 98 F | OXYGEN SATURATION: 98 % | DIASTOLIC BLOOD PRESSURE: 70 MMHG | BODY MASS INDEX: 30.12 KG/M2 | HEART RATE: 58 BPM | WEIGHT: 180.75 LBS

## 2025-02-20 DIAGNOSIS — Z85.3 HISTORY OF BREAST CANCER: ICD-10-CM

## 2025-02-20 DIAGNOSIS — Z00.00 ANNUAL PHYSICAL EXAM: Primary | ICD-10-CM

## 2025-02-20 DIAGNOSIS — E66.9 OBESITY, UNSPECIFIED CLASS, UNSPECIFIED OBESITY TYPE, UNSPECIFIED WHETHER SERIOUS COMORBIDITY PRESENT: ICD-10-CM

## 2025-02-20 PROCEDURE — 99999 PR PBB SHADOW E&M-EST. PATIENT-LVL V: CPT | Mod: PBBFAC,,, | Performed by: FAMILY MEDICINE

## 2025-02-20 RX ORDER — SEMAGLUTIDE 1.7 MG/.75ML
1.7 INJECTION, SOLUTION SUBCUTANEOUS WEEKLY
Qty: 2 ML | Refills: 2 | Status: SHIPPED | OUTPATIENT
Start: 2025-02-20

## 2025-02-20 RX ORDER — SEMAGLUTIDE 1 MG/.5ML
1 INJECTION, SOLUTION SUBCUTANEOUS WEEKLY
Qty: 2 ML | Refills: 2 | Status: SHIPPED | OUTPATIENT
Start: 2025-02-20 | End: 2025-02-20 | Stop reason: SDUPTHER

## 2025-02-20 RX ORDER — SEMAGLUTIDE 1 MG/.5ML
1 INJECTION, SOLUTION SUBCUTANEOUS WEEKLY
Qty: 2 ML | Refills: 2 | Status: SHIPPED | OUTPATIENT
Start: 2025-02-20

## 2025-02-20 NOTE — PROGRESS NOTES
"Chief Complaint   Patient presents with    Follow-up       SUBJECTIVE:   64 y.o. female for annual routine checkup.    Current Medications[1]  Allergies: Penicillins and Other   No LMP recorded (lmp unknown). Patient has had a hysterectomy.    ROS:  Feeling well. No dyspnea or chest pain on exertion.  No abdominal pain, change in bowel habits, black or bloody stools.  No urinary tract symptoms. GYN ROS: no abnormal bleeding. No neurological complaints.    OBJECTIVE:   The patient appears well, alert, oriented x 3, in no distress.  /70   Pulse (!) 58   Temp 97.7 °F (36.5 °C) (Oral)   Ht 5' 5" (1.651 m)   Wt 82 kg (180 lb 12.4 oz)   LMP  (LMP Unknown) Comment: TVH/BSO   2001  SpO2 98%   BMI 30.08 kg/m²   Wt Readings from Last 5 Encounters:   02/20/25 82 kg (180 lb 12.4 oz)   01/09/25 83.8 kg (184 lb 11.9 oz)   12/03/24 84.1 kg (185 lb 6.5 oz)   09/18/24 81.6 kg (180 lb)   08/21/24 87 kg (191 lb 12.8 oz)       ENT normal.  Neck supple. No adenopathy or thyromegaly. KODY. Lungs are clear, good air entry, no wheezes, rhonchi or rales. S1 and S2 normal, no murmurs, regular rate and rhythm. Abdomen soft without tenderness, guarding, mass or organomegaly. Extremities show no edema, normal peripheral pulses. Neurological is normal, no focal findings.  Weight is coming down    BREAST EXAM: deferred    PELVIC EXAM: deferred    ASSESSMENT:   1. Annual physical exam    2. Obesity, unspecified class, unspecified obesity type, unspecified whether serious comorbidity present    3. History of breast cancer          PLAN:   Counseled on age appropriate medical preventative services, including age appropriate cancer screenings, over all nutritional health, need for a consistent exercise regimen and an over all push towards maintaining a vigorous and active lifestyle.  Counseled on age appropriate vaccines and discussed upcoming health care needs based on age/gender.  Spent time with patient counseling on need for a good " patient/doctor relationship moving forward.  Discussed use of common OTC medications and supplements.  Discussed common dietary aids and use of caffeine and the need for good sleep hygiene and stress management.    Problem List Items Addressed This Visit       History of breast cancer    Relevant Medications    semaglutide, weight loss, (WEGOVY) 1 mg/0.5 mL PnIj     Other Visit Diagnoses         Annual physical exam    -  Primary    Relevant Orders    CBC Auto Differential    Comprehensive Metabolic Panel    Urinalysis, Reflex to Urine Culture Urine, Clean Catch    Hemoglobin A1C    Lipid Panel    TSH      Obesity, unspecified class, unspecified obesity type, unspecified whether serious comorbidity present        Relevant Medications    semaglutide, weight loss, (WEGOVY) 1 mg/0.5 mL PnIj            F/u in 1 year for wellness         [1]   Current Outpatient Medications   Medication Sig Dispense Refill    albuterol (PROVENTIL/VENTOLIN HFA) 90 mcg/actuation inhaler Inhale 2 puffs into the lungs every 6 (six) hours as needed for Wheezing. 18 g 12    CETIRIZINE HCL (ZYRTEC ORAL) daily as needed.       cloNIDine (CATAPRES) 0.1 MG tablet TAKE 1 TABLET BY MOUTH EVERY DAY 90 tablet 3    diazePAM (VALIUM) 5 MG tablet       docusate sodium (STOOL SOFTENER) 50 MG capsule Take 50 mg by mouth 2 (two) times daily.      fluocinonide 0.05% (LIDEX) 0.05 % cream Apply topically 2 (two) times daily. back 30 g 1    ibandronate (BONIVA) 150 mg tablet TAKE 1 TABLET BY MOUTH EVERY 30 DAYS. 3 tablet 3    linaCLOtide (LINZESS) 72 mcg Cap capsule Take 1 capsule (72 mcg total) by mouth before breakfast. 30 capsule 12    meclizine (ANTIVERT) 12.5 mg tablet Take 1 tablet (12.5 mg total) by mouth 3 (three) times daily as needed. 20 tablet 0    mometasone (ASMANEX TWISTHALER) 220 mcg (60 doses) AePB 2 puffs inhaled once daily. Disp one canister 1 g 1    ondansetron (ZOFRAN-ODT) 4 MG TbDL Take 1 tablet (4 mg total) by mouth every 6 (six) hours as  needed. 20 tablet 0    paroxetine (PAXIL) 20 MG tablet TAKE 1 TABLET BY MOUTH EVERY DAY IN THE MORNING 90 tablet 3    polyethylene glycol (GLYCOLAX) 17 gram/dose powder Use cap to measure out (17 g) mix with a liquid and take by mouth once daily. 510 g 0    prasterone, dhea, (INTRAROSA) 6.5 mg Inst Place 1 tablet vaginally twice a week. 28 each 11    prasterone, dhea, (INTRAROSA) 6.5 mg Inst Place 6.5 mg vaginally twice a week. 28 each 3    semaglutide, weight loss, (WEGOVY) 0.5 mg/0.5 mL PnIj Inject 0.5 mg into the skin every 7 days. 4 mL 0    tolterodine (DETROL LA) 4 MG 24 hr capsule TAKE 1 CAPSULE BY MOUTH EVERY DAY IN THE MORNING 90 capsule 3    tretinoin (RETIN-A) 0.025 % cream Apply topically every evening. 45 g 1    ALPRAZolam (XANAX) 0.5 MG tablet Take 1 tablet (0.5 mg total) by mouth 3 (three) times daily as needed for Anxiety (before flying). 6 tablet 0    epinephrine (EPIPEN) 0.3 mg/0.3 mL (1:1,000) AtIn Inject 0.3 mLs (0.3 mg total) into the muscle once. 1 Pen Injector Intramuscular .  use as directed (Patient not taking: Reported on 8/21/2024) 0.3 mL 0    oxybutynin (DITROPAN) 5 MG Tab Take 1 tablet (5 mg total) by mouth 3 (three) times daily as needed. 21 tablet 0    semaglutide, weight loss, (WEGOVY) 0.25 mg/0.5 mL PnIj Inject 0.25 mg into the skin every 7 days. (Patient not taking: Reported on 2/20/2025) 2 mL 0    semaglutide, weight loss, (WEGOVY) 1 mg/0.5 mL PnIj Inject 1 mg into the skin once a week. 2 mL 2    semaglutide, weight loss, (WEGOVY) 1.7 mg/0.75 mL PnIj Inject 1.7 mg into the skin once a week. 2 mL 2    tamsulosin (FLOMAX) 0.4 mg Cap Take 1 capsule (0.4 mg total) by mouth every evening. for 7 days 7 capsule 0     No current facility-administered medications for this visit.

## 2025-02-24 ENCOUNTER — PATIENT MESSAGE (OUTPATIENT)
Dept: FAMILY MEDICINE | Facility: CLINIC | Age: 65
End: 2025-02-24
Payer: COMMERCIAL

## 2025-03-25 ENCOUNTER — TELEPHONE (OUTPATIENT)
Dept: OBSTETRICS AND GYNECOLOGY | Facility: CLINIC | Age: 65
End: 2025-03-25
Payer: COMMERCIAL

## 2025-03-25 DIAGNOSIS — K59.00 CONSTIPATION, UNSPECIFIED CONSTIPATION TYPE: Primary | ICD-10-CM

## 2025-03-25 NOTE — TELEPHONE ENCOUNTER
Care Due:                  Date            Visit Type   Department     Provider  --------------------------------------------------------------------------------                                [facility]                              PRIMARY      MEDICINE/INTERN  Last Visit: 02-      CARE ([facility])   [facility]         Vince Lafleur  Next Visit: None Scheduled  None         None Found                                                            Last  Test          Frequency    Reason                     Performed    Due Date  --------------------------------------------------------------------------------    HBA1C.......  6 months...  semaglutide,.............  Not Found    Overdue    [employer] Embedded Care Due Messages. Reference number: [id number].   3/25/2025 2:07:18 PM CDT

## 2025-03-27 ENCOUNTER — TELEPHONE (OUTPATIENT)
Dept: GASTROENTEROLOGY | Facility: CLINIC | Age: 65
End: 2025-03-27
Payer: COMMERCIAL

## 2025-03-27 NOTE — TELEPHONE ENCOUNTER
----- Message from [employer] sent at 3/27/2025  1:44 PM CDT -----  Regarding: Mayra  Type:  Sooner Appointment Request Patient is requesting a sooner appointment.  Patient declined first available appointment listed as well as another facility and provider .  Patient will not accept being placed on the waitlist and is requesting a message be sent to doctor. Name of Caller: Franklynlaury When is the first available appointment? None available Symptoms: Constipation. Patient would like for someone to call her for an appointment. She will be a new patient to the office. Please reach out to her for scheduling. Would the patient rather a call back or a response via [facility]? Callback Best Call Back Number: [phone] or [phone] 's number and he will schedule the appointmentAdditional Information:

## 2025-04-02 ENCOUNTER — TELEPHONE (OUTPATIENT)
Dept: GASTROENTEROLOGY | Facility: CLINIC | Age: 65
End: 2025-04-02
Payer: COMMERCIAL

## 2025-04-02 NOTE — TELEPHONE ENCOUNTER
MA called and spoke with patient about sooner appt,patient stated that she wanted to be seen in person but the MD do not do that and patient stated that she will keep her virtual and will see how the visit goes

## 2025-04-02 NOTE — TELEPHONE ENCOUNTER
----- Message from Braulio Jackson sent at 4/2/2025  3:30 PM CDT -----  Type:  Sooner Appointment RequestPatient is requesting a sooner appointment.  Patient declined first available appointment listed as well as another facility and provider .  Patient will not accept being placed on the waitlist and is requesting a message be sent to doctor.Name of Caller:Pt When is the first available appointment?4/24 virtual Symptoms:ConstipationWould the patient rather a call back or a response via [facility]?Best Call Back Number:Telephone Information:Mobile          [phone] Additional Information: Prefers in person

## 2025-04-24 ENCOUNTER — OFFICE VISIT (OUTPATIENT)
Dept: GASTROENTEROLOGY | Facility: CLINIC | Age: 65
End: 2025-04-24
Payer: COMMERCIAL

## 2025-04-24 DIAGNOSIS — K59.00 CONSTIPATION, UNSPECIFIED CONSTIPATION TYPE: ICD-10-CM

## 2025-04-24 NOTE — PATIENT INSTRUCTIONS
Pepcid at bedtime (famotidine)     Start psyllium fiber supplement daily   Take 1 tsp of fiber powder (psyllium or other sugar-free powder).  Mix in 8 oz of water.  Take x 3-5 days.  Then, increase fiber by 1 tsp every 3-5 days until stool is easy to pass.  Stop and continue at that dose.   Do not exceed 6 tsps/day. GOAL:  More well-formed stool (one continuous well-formed piece vs. Pellets) and minimize straining with initiation.    Linzess 145mcg daily (may feel cramping at first, this should pass in 1-2 weeks)     Increase dietary fiber (see below)     Increase dietary fiber to goal of 20-30g   Take a fiber supplement (psyllium husk)   Inulin fiber (chicory root) is a good fiber source as well     Soluble fiber sources:   Raspberries   Pear   Sweet potato   Carrots   Flaxseeds   Green peas   [place] sprouts   [place]   Avocado   Chickpeas   Lentils          [facility]  DIET RECOMMENDATIONS    Fruits:  Use all fruits and juices liberally; fresh, cooked, dried or canned. Eat fruit raw and with skins when possible. Have at least four servings of fruit daily including a citrus fruit and a stewed dried fruit. Hard seeds of fruits (berries, figs, Grapes, mangoes, tomatoes) etc. may be removed.    Vegetables: Use all vegetables liberally. Green leafy vegetables, such as cabbage, spinach, lettuce, broccoli, and other greens are particularly good.    Potato: As desired. Serve baked frequently and eat the skin. Other starchy foods such as rice, macaroni, etc., may be occasionally substituted. Chew popcorn well and do not eat hard kernels.    Meat, Fish, Poultry: One or two servings daily.    Eggs: One daily if you are not on a low cholesterol diet.    Milk: Include at least one-half pint daily. Whole milk or skimmed may be used.     Cereals: Use whole grain breads and cereals such as bran, bran flakes, grape nut flakes, puffed wheat, oatmeal, Wheaties, etc., as much as possible. Refined breaks and cereals  are not restricted; however, they do not contain the bulk necessary for this diet.     Sugars, Sweets: Use very moderately. Depend on fruit as dessert.    Fats: Use butter or margarine as desired. Oil or dressing on salads as desired. Fried foods may be used in moderation. Nuts may be eaten if you chew them well and may be ground or finely chopped for cooking.   Sample Menu                                                                                 Breakfast                          Lunch  Orange juice, 4 ounces                                                Vegetable soup                    Stewed fruit                                                                 Fresh fruit plate with cottage cheese  Shredded wheat                                                           Whole wheat toast  Scrambled eggs                                                           Butter  Whole wheat toast                                                       Coffee or tea  Dinner                                                                         Bedtime  Large glass tomato juice                                             1 glass milk  Roast beef                                                                   stewed fruit  Baked potato with skin  Buttered spinach  Raw vegetable salad  Baked apple with skin   Coffee or tea      [facility]  High Fiber Diet    15 grams of fiber per day is recommended  Fiber cereal = 5 grams (Raisin Bran, Shredded Wheat, Grape Nuts)  Konsyl 1 teaspoon = 6 grams  Metamucil 1 tablespoon = 3 grams  Citrucel 1 tablespoon = 2 grams  Fiber Choice = 3-4 per day    This diet furnishes adequate amounts of all the essential nutrients needed by the body and a very liberal fiber or roughage content. Roughage is indigestible fiber found in fruits, vegetables and whole grain cereals. It provides bulk to the large intestine and, accompanied by an adequate fluid intake, is a stimulant to  elimination. Regular eating and elimination habits are vital to good health.     How to Increase Your Fiber Intake    Drink at least 4-5 glasses of liquids per day or the fiber can be constipating rather then stimulating to your gut.  Boil and bake potatoes in their skin. Eat the skin, too.  Include fresh fruits and raw vegetables in your daily diet. Raw fruits and vegetables have more useful fiber than those that have been peeled, cooked, pureed, or otherwise processed.  Eat a wide variety of fibrous foods in reasonable amounts. Increase fiber intake slowly especially if you have been on a low-fiber diet.  Eat more legumes-peas, beans, soybeans.  For snacks, try dried fruit, whole wheat and rye crackers.  Avoid instant-cook hot cereals. Use the longer cooking cereals.  Use bran whenever possible. Sprinkle it on top of cereal, mix it into mashed potatoes or hamburger meat, or use it in combination dishes such as meat loaf.   Substitute whole grain, whole wheat and bran products for white flour products.  Eat slowly and chew your food thoroughly.

## 2025-04-24 NOTE — PROGRESS NOTES
[facility] Telemedicine Consult Note    The patient location is: home   The chief complaint leading to consultation is: constipation     Visit type: audiovisual    Face to Face time with patient: 15  30 minutes of total time spent on the encounter, which includes face to face time and non-face to face time preparing to see the patient (eg, review of tests), Obtaining and/or reviewing separately obtained history, Documenting clinical information in the electronic or other health record, Independently interpreting results (not separately reported) and communicating results to the patient/family/caregiver, or Care coordination (not separately reported).       Each patient to whom he or she provides medical services by telemedicine is:  (1) informed of the relationship between the physician and patient and the respective role of any other health care provider with respect to management of the patient; and (2) notified that he or she may decline to receive medical services by telemedicine and may withdraw from such care at any time.       PCP:   Vince Lafleur   [address] [place] [address], [place] [address] / [place] [address]    Referring MD:  Jacqueline Barragan Md  [address] [place] [address], [place] [address]    HPI:  This is a 64 y.o. female here for evaluation of constipation. History of hypothyroidism, osteopenia. Past history of breast cancer in 2012. Has had issues for years with constipation.   She is taking miralax daily, two colaces and having maybe 1 BM per week. Has to use suppositories every 1-2 months to help go. She will have the urge to go but then cannot pass the stool. No blood in stool. Has associated abdominal bloating. Stools are hard and pebble like. Has incomplete evacuation as well. On wegovy for 5 months now which sometimes helps her go to bathroom more regularly. It has caused some reflux symptoms though.     Last blood work in 2024 showed normal CBC and CMP. TSH wnl. KUB  in 2023 showed moderate stool. Cscope in 2024 removed one small polyp and showed hemorrhoids and areas of melanosis coli. Cscope in 2013 normal. EGD with erosive gastropathy in 2018. HP negative. S/p CCY. She was seen by Dr Cabral for constipation in 2018.        Medical History:  has a past medical history of Abnormal Pap smear of cervix (1988), Acquired hypothyroidism (09/19/2017), Asthma, Breast cancer (1992), Chronic constipation, Food allergy (08/15/2012), Genetic testing (12/2006), Heart murmur, History of breast cancer (11/12/2012), kidney stone, Osteopenia of multiple sites (11/29/2023), and Rhinitis, allergic (08/15/2012).    Surgical History:  has a past surgical history that includes Bilateral salpingoophorectomy; Breast biopsy (Right, ~1995); Cervical conization w/ laser (1988); Breast lumpectomy (Left, 1992); Gallbladder surgery (04/2018); Anal fistulotomy; Oophorectomy (2002); Cholecystectomy (04/2018); Hysterectomy (2002); Colonoscopy (N/A, 3/11/2024); Cystoscopy (N/A, 3/28/2024); Ureteroscopy (Left, 3/28/2024); Laser lithotripsy (Left, 3/28/2024); extraction - stone (Left, 3/28/2024); placement-stent (Left, 3/28/2024); and Pyeloscopy (Left, 3/28/2024).    Family History: family history includes Aortic aneurysm in her father; Breast cancer in her maternal aunt, maternal aunt, and paternal aunt; Cancer in her father and maternal aunt; Diabetes in her father; Heart disease in her father; Hypertension in her mother; Lung cancer in her father; Nephrolithiasis in her mother..     Social History:  reports that she has never smoked. She has never used smokeless tobacco. She reports that she does not drink alcohol and does not use drugs.          Imaging:    KUB 2023   Moderate stool in colon     Endoscopy:      Cscope 2024          External and internal hemorrhoids were found during retroflexion.        The hemorrhoids were Grade I (internal hemorrhoids that do not        prolapse).        A 5 mm polyp was  found in the ascending colon. The polyp was        sessile. The polyp was removed with a cold snare. Resection and        retrieval were complete.        An area of melanosis was found in the entire colon.     EGD 2018  The examined esophagus was normal.        A few dispersed, small non-bleeding erosions were found in the        prepyloric region of the stomach. There were no stigmata of recent        bleeding. Biopsies were taken with a cold forceps for Helicobacter        pylori testing. Estimated blood loss was minimal.        The examined duodenum was normal. Biopsies were taken with a cold        forceps for Helicobacter pylori testing. Estimated blood loss was        minimal.     Assessment:    Chronic constipation   Reflux   GLP1     Patient with chronic constipation without alarm features not improved with miralax, colace, suppositories. She has tried linzess at low dose in past which only helped a little she recalls. Start 145mcg dose and continue miralax. Start fiber supplement and increase dietary fiber. Pepcid for nausea since being on wegovy     Recommendations:    - Start linzess 145mcg daily   - Fiber supplement (psyllium)   - Miralax daily   - High fiber diet   - Adequate hydration, squatty potty use   - Pepcid for reflux while on wegovy    - Cscope in 2031 for CRC surveillance     RTC 3 months       Order summary:         Thank you so much for allowing me to participate in the care of Mayra Saini MD

## 2025-06-23 ENCOUNTER — TELEPHONE (OUTPATIENT)
Dept: FAMILY MEDICINE | Facility: CLINIC | Age: 65
End: 2025-06-23
Payer: MEDICARE

## 2025-06-23 NOTE — TELEPHONE ENCOUNTER
Patient stating insurance has change requesting change Semiglutide or Zepbound that is covered by [employer] per patient, please advise.        Copied from CRM #[id number]. Topic: Medications - New Medication Request  >> Jun 23, 2025 11:26 AM Myke wrote:  Type: RX Refill Request    Who Called: self    Have you contacted your pharmacy:    Refill or New Rx: new    RX Name and Strength:weight loss    How is the patient currently taking it? (ex. 1XDay):    Is this a 30 day or 90 day RX:    Preferred Pharmacy with phone number:.  [facility]/pharmacy #[id number] - [place], [place] - [address]  [address] [address]  Phone: [phone] Fax: [phone]    Local or Mail Order:local    Ordering Provider:fabien    Would the patient rather a call back or a response via [facility]? call    Best Call Back Number:.[phone]    Additional Information: ins changed needs a alternative weight loss sent in [employer] covers either semiglutide or zepbound

## 2025-06-26 ENCOUNTER — LAB VISIT (OUTPATIENT)
Dept: LAB | Facility: HOSPITAL | Age: 65
End: 2025-06-26
Attending: FAMILY MEDICINE
Payer: MEDICARE

## 2025-06-26 ENCOUNTER — PATIENT MESSAGE (OUTPATIENT)
Dept: FAMILY MEDICINE | Facility: CLINIC | Age: 65
End: 2025-06-26
Payer: MEDICARE

## 2025-06-26 DIAGNOSIS — Z00.00 ANNUAL PHYSICAL EXAM: ICD-10-CM

## 2025-06-26 LAB
ABSOLUTE EOSINOPHIL (OHS): 0.11 K/UL
ABSOLUTE MONOCYTE (OHS): 0.39 K/UL (ref 0.3–1)
ABSOLUTE NEUTROPHIL COUNT (OHS): 2.23 K/UL (ref 1.8–7.7)
ALBUMIN SERPL BCP-MCNC: 3.7 G/DL (ref 3.5–5.2)
ALP SERPL-CCNC: 71 UNIT/L (ref 40–150)
ALT SERPL W/O P-5'-P-CCNC: 13 UNIT/L (ref 10–44)
ANION GAP (OHS): 10 MMOL/L (ref 8–16)
AST SERPL-CCNC: 14 UNIT/L (ref 11–45)
BASOPHILS # BLD AUTO: 0.05 K/UL
BASOPHILS NFR BLD AUTO: 1.1 %
BILIRUB SERPL-MCNC: 0.5 MG/DL (ref 0.1–1)
BILIRUB UR QL STRIP.AUTO: NEGATIVE
BUN SERPL-MCNC: 16 MG/DL (ref 8–23)
CALCIUM SERPL-MCNC: 9.7 MG/DL (ref 8.7–10.5)
CHLORIDE SERPL-SCNC: 107 MMOL/L (ref 95–110)
CHOLEST SERPL-MCNC: 208 MG/DL (ref 120–199)
CHOLEST/HDLC SERPL: 3.7 {RATIO} (ref 2–5)
CLARITY UR: CLEAR
CO2 SERPL-SCNC: 24 MMOL/L (ref 23–29)
COLOR UR AUTO: YELLOW
CREAT SERPL-MCNC: 1 MG/DL (ref 0.5–1.4)
EAG (OHS): 103 MG/DL (ref 68–131)
ERYTHROCYTE [DISTWIDTH] IN BLOOD BY AUTOMATED COUNT: 13.6 % (ref 11.5–14.5)
GFR SERPLBLD CREATININE-BSD FMLA CKD-EPI: >60 ML/MIN/1.73/M2
GLUCOSE SERPL-MCNC: 94 MG/DL (ref 70–110)
GLUCOSE UR QL STRIP: NEGATIVE
HBA1C MFR BLD: 5.2 % (ref 4–5.6)
HCT VFR BLD AUTO: 39.8 % (ref 37–48.5)
HDLC SERPL-MCNC: 56 MG/DL (ref 40–75)
HDLC SERPL: 26.9 % (ref 20–50)
HGB BLD-MCNC: 13.3 GM/DL (ref 12–16)
HGB UR QL STRIP: NEGATIVE
IMM GRANULOCYTES # BLD AUTO: 0.01 K/UL (ref 0–0.04)
IMM GRANULOCYTES NFR BLD AUTO: 0.2 % (ref 0–0.5)
KETONES UR QL STRIP: NEGATIVE
LDLC SERPL CALC-MCNC: 136 MG/DL (ref 63–159)
LEUKOCYTE ESTERASE UR QL STRIP: NEGATIVE
LYMPHOCYTES # BLD AUTO: 1.85 K/UL (ref 1–4.8)
MCH RBC QN AUTO: 30.8 PG (ref 27–31)
MCHC RBC AUTO-ENTMCNC: 33.4 G/DL (ref 32–36)
MCV RBC AUTO: 92 FL (ref 82–98)
NITRITE UR QL STRIP: NEGATIVE
NONHDLC SERPL-MCNC: 152 MG/DL
NUCLEATED RBC (/100WBC) (OHS): 0 /100 WBC
PH UR STRIP: 6 [PH]
PLATELET # BLD AUTO: 171 K/UL (ref 150–450)
PMV BLD AUTO: 11.2 FL (ref 9.2–12.9)
POTASSIUM SERPL-SCNC: 4.2 MMOL/L (ref 3.5–5.1)
PROT SERPL-MCNC: 8 GM/DL (ref 6–8.4)
PROT UR QL STRIP: NEGATIVE
RBC # BLD AUTO: 4.32 M/UL (ref 4–5.4)
RELATIVE EOSINOPHIL (OHS): 2.4 %
RELATIVE LYMPHOCYTE (OHS): 39.9 % (ref 18–48)
RELATIVE MONOCYTE (OHS): 8.4 % (ref 4–15)
RELATIVE NEUTROPHIL (OHS): 48 % (ref 38–73)
SODIUM SERPL-SCNC: 141 MMOL/L (ref 136–145)
SP GR UR STRIP: 1.01
TRIGL SERPL-MCNC: 80 MG/DL (ref 30–150)
TSH SERPL-ACNC: 3.25 UIU/ML (ref 0.4–4)
UROBILINOGEN UR STRIP-ACNC: NEGATIVE EU/DL
WBC # BLD AUTO: 4.64 K/UL (ref 3.9–12.7)

## 2025-06-26 PROCEDURE — 83036 HEMOGLOBIN GLYCOSYLATED A1C: CPT

## 2025-06-26 PROCEDURE — 85025 COMPLETE CBC W/AUTO DIFF WBC: CPT

## 2025-06-26 PROCEDURE — 80053 COMPREHEN METABOLIC PANEL: CPT

## 2025-06-26 PROCEDURE — 84443 ASSAY THYROID STIM HORMONE: CPT

## 2025-06-26 PROCEDURE — 36415 COLL VENOUS BLD VENIPUNCTURE: CPT | Mod: PO

## 2025-06-26 PROCEDURE — 80061 LIPID PANEL: CPT

## 2025-06-26 PROCEDURE — 81003 URINALYSIS AUTO W/O SCOPE: CPT

## 2025-06-27 ENCOUNTER — RESULTS FOLLOW-UP (OUTPATIENT)
Dept: FAMILY MEDICINE | Facility: CLINIC | Age: 65
End: 2025-06-27

## 2025-06-27 LAB — HOLD SPECIMEN: NORMAL

## 2025-07-28 ENCOUNTER — PATIENT MESSAGE (OUTPATIENT)
Dept: FAMILY MEDICINE | Facility: CLINIC | Age: 65
End: 2025-07-28
Payer: MEDICARE

## 2025-07-30 ENCOUNTER — PATIENT MESSAGE (OUTPATIENT)
Dept: FAMILY MEDICINE | Facility: CLINIC | Age: 65
End: 2025-07-30
Payer: MEDICARE

## 2025-08-05 ENCOUNTER — TELEPHONE (OUTPATIENT)
Dept: HEMATOLOGY/ONCOLOGY | Facility: CLINIC | Age: 65
End: 2025-08-05
Payer: MEDICARE

## 2025-08-05 DIAGNOSIS — E03.9 ACQUIRED HYPOTHYROIDISM: ICD-10-CM

## 2025-08-05 DIAGNOSIS — Z85.3 HISTORY OF BREAST CANCER: ICD-10-CM

## 2025-08-05 DIAGNOSIS — I10 ESSENTIAL HYPERTENSION: Primary | ICD-10-CM

## 2025-08-05 NOTE — TELEPHONE ENCOUNTER
Left message for patient   Provided call back number  Contact name   Reason for calling : left message for patient to contact office. Her mammogram is to early to be scheduled. Her last mammogram was 12/3.      Awaiting patient call back

## 2025-08-05 NOTE — TELEPHONE ENCOUNTER
Copied from CRM #[id number]. Topic: Appointments - Amb Referral  >> Aug 5, 2025 11:26 AM Keerthi wrote:  Type : Patient Call    Who Called : Patient      Does the patient know what this is regarding?: Pt called to schedule appt. Pt is also wanting to schedule labs and mammogram as well.      Would the patient rather a call back or a response via [facility]?  Call    Best Call Back Number: [phone]      Additional Information:  
Patient contacted to schedule appointment with provider Dr. Lewis . Appointment has been scheduled , appointment details confirmed , appointment reminder printed and mailed. No further questions or concerns noted during call.     
Patient informed/TV/Family informed/Explanation of wait

## 2025-08-06 ENCOUNTER — PATIENT MESSAGE (OUTPATIENT)
Dept: FAMILY MEDICINE | Facility: CLINIC | Age: 65
End: 2025-08-06
Payer: MEDICARE

## 2025-08-13 ENCOUNTER — TELEPHONE (OUTPATIENT)
Dept: FAMILY MEDICINE | Facility: CLINIC | Age: 65
End: 2025-08-13
Payer: MEDICARE

## 2025-08-14 ENCOUNTER — TELEPHONE (OUTPATIENT)
Dept: FAMILY MEDICINE | Facility: CLINIC | Age: 65
End: 2025-08-14
Payer: MEDICARE

## 2025-08-14 DIAGNOSIS — N31.9 NEUROMUSCULAR DYSFUNCTION OF BLADDER, UNSPECIFIED: ICD-10-CM

## 2025-08-15 RX ORDER — TOLTERODINE 4 MG/1
4 CAPSULE, EXTENDED RELEASE ORAL EVERY MORNING
Qty: 90 CAPSULE | Refills: 3 | Status: SHIPPED | OUTPATIENT
Start: 2025-08-15

## 2025-08-15 RX ORDER — SEMAGLUTIDE 1.7 MG/.75ML
1.7 INJECTION, SOLUTION SUBCUTANEOUS WEEKLY
Qty: 2 ML | Refills: 2 | Status: SHIPPED | OUTPATIENT
Start: 2025-08-15

## 2025-08-20 DIAGNOSIS — K59.00 CONSTIPATION, UNSPECIFIED CONSTIPATION TYPE: ICD-10-CM

## 2025-08-27 ENCOUNTER — TELEPHONE (OUTPATIENT)
Dept: FAMILY MEDICINE | Facility: CLINIC | Age: 65
End: 2025-08-27
Payer: MEDICARE

## 2025-08-28 ENCOUNTER — OFFICE VISIT (OUTPATIENT)
Dept: FAMILY MEDICINE | Facility: CLINIC | Age: 65
End: 2025-08-28
Payer: MEDICARE

## 2025-08-28 VITALS
HEIGHT: 65 IN | WEIGHT: 169.75 LBS | HEART RATE: 56 BPM | SYSTOLIC BLOOD PRESSURE: 130 MMHG | TEMPERATURE: 98 F | OXYGEN SATURATION: 97 % | BODY MASS INDEX: 28.28 KG/M2 | DIASTOLIC BLOOD PRESSURE: 80 MMHG

## 2025-08-28 DIAGNOSIS — T78.3XXA ANGIOEDEMA, INITIAL ENCOUNTER: ICD-10-CM

## 2025-08-28 DIAGNOSIS — M85.89 OSTEOPENIA OF MULTIPLE SITES: ICD-10-CM

## 2025-08-28 DIAGNOSIS — L98.9 SKIN LESION: ICD-10-CM

## 2025-08-28 DIAGNOSIS — Z85.3 HISTORY OF BREAST CANCER: ICD-10-CM

## 2025-08-28 DIAGNOSIS — F39 MOOD DISORDER: ICD-10-CM

## 2025-08-28 DIAGNOSIS — J45.20 MILD INTERMITTENT ASTHMA WITHOUT COMPLICATION: ICD-10-CM

## 2025-08-28 DIAGNOSIS — E03.9 ACQUIRED HYPOTHYROIDISM: ICD-10-CM

## 2025-08-28 DIAGNOSIS — Z86.0100 HISTORY OF COLONIC POLYPS: ICD-10-CM

## 2025-08-28 DIAGNOSIS — K59.09 CHRONIC CONSTIPATION: ICD-10-CM

## 2025-08-28 DIAGNOSIS — Z91.018 ALLERGY TO MEAT: ICD-10-CM

## 2025-08-28 DIAGNOSIS — E66.3 OVERWEIGHT (BMI 25.0-29.9): Primary | ICD-10-CM

## 2025-08-28 PROCEDURE — 99999 PR PBB SHADOW E&M-EST. PATIENT-LVL IV: CPT | Mod: PBBFAC,,, | Performed by: INTERNAL MEDICINE

## 2025-08-28 RX ORDER — EPINEPHRINE 0.3 MG/.3ML
1 INJECTION SUBCUTANEOUS ONCE
Qty: 2 EACH | Refills: 12 | Status: SHIPPED | OUTPATIENT
Start: 2025-08-28 | End: 2025-08-28

## 2025-08-28 RX ORDER — TIRZEPATIDE 2.5 MG/.5ML
2.5 INJECTION, SOLUTION SUBCUTANEOUS
Qty: 4 PEN | Refills: 6 | Status: SHIPPED | OUTPATIENT
Start: 2025-08-28

## 2025-08-29 ENCOUNTER — TELEPHONE (OUTPATIENT)
Dept: RADIOLOGY | Facility: HOSPITAL | Age: 65
End: 2025-08-29
Payer: MEDICARE

## 2025-09-05 ENCOUNTER — TELEPHONE (OUTPATIENT)
Dept: DERMATOLOGY | Facility: CLINIC | Age: 65
End: 2025-09-05
Payer: MEDICARE

## (undated) DEVICE — SEE MEDLINE ITEM 157128

## (undated) DEVICE — EXTRACTOR TIPLESS 2.4FRX1115CM

## (undated) DEVICE — SOL NS 1000CC

## (undated) DEVICE — SUT 0 VICRYL / UR6 (J603)

## (undated) DEVICE — TUBING HF INSUFFLATION W/ FLTR

## (undated) DEVICE — TROCAR ENDOPATH XCEL 11MM 10CM

## (undated) DEVICE — SOL NACL IRR 3000ML

## (undated) DEVICE — NDL HYPO REG 25G X 1 1/2

## (undated) DEVICE — UNDERGLOVES BIOGEL PI SIZE 7.5

## (undated) DEVICE — TRAY MINOR GEN SURG

## (undated) DEVICE — SCISSOR 5MMX35CM DIRECT DRIVE

## (undated) DEVICE — STRIP STERI REIN CLSR 1/2X2IN

## (undated) DEVICE — DRESSING LEUKOPLAST FLEX 1X3IN

## (undated) DEVICE — SOL IRRI STRL WATER 1000ML

## (undated) DEVICE — SHEATH FLEXOR URETERAL 45CM

## (undated) DEVICE — CLIP HEMO-LOK ML

## (undated) DEVICE — ELECTRODE REM PLYHSV RETURN 9

## (undated) DEVICE — IRRIGATOR ENDOSCOPY DISP.

## (undated) DEVICE — TRAY CYSTO BASIN OMC

## (undated) DEVICE — BANDAGE ADHESIVE

## (undated) DEVICE — BAG TISS RETRV MONARCH 10MM

## (undated) DEVICE — FIBER QUANTA OPT SDT 272UM

## (undated) DEVICE — SUT GUT PL. 4-0 27 FS-2

## (undated) DEVICE — SYR 10CC LUER LOCK

## (undated) DEVICE — BLADE SURG CARBON STEEL SZ11

## (undated) DEVICE — SYR ONLY LUER LOCK 20CC

## (undated) DEVICE — SUT PROLENE 2-0 KS BL MONO

## (undated) DEVICE — GUIDE WIRE MOTION .035 X 150CM

## (undated) DEVICE — ADHESIVE MASTISOL VIAL 48/BX

## (undated) DEVICE — TROCAR ENDOPATH XCEL 5MM 7.5CM

## (undated) DEVICE — GUIDEWIRE STR TIP HIWIRE 150CM

## (undated) DEVICE — PACK CYSTOSCOPY III SIRUS

## (undated) DEVICE — Device

## (undated) DEVICE — ADAPTER HOSE 10FT 8MM